# Patient Record
Sex: MALE | Race: WHITE | Employment: OTHER | URBAN - METROPOLITAN AREA
[De-identification: names, ages, dates, MRNs, and addresses within clinical notes are randomized per-mention and may not be internally consistent; named-entity substitution may affect disease eponyms.]

---

## 2017-01-09 RX ORDER — RANITIDINE 150 MG/1
TABLET ORAL
Qty: 90 TABLET | Refills: 0 | Status: SHIPPED | OUTPATIENT
Start: 2017-01-09 | End: 2017-01-10

## 2017-01-09 NOTE — TELEPHONE ENCOUNTER
Patient's wife called requesting a refill for RANITIDINE  MG Oral Tab  To be sent to:   [facility] , [place] [address] [place] [address] [phone], [phone]

## 2017-01-10 ENCOUNTER — OFFICE VISIT (OUTPATIENT)
Dept: INTERNAL MEDICINE CLINIC | Facility: CLINIC | Age: 82
End: 2017-01-10

## 2017-01-10 VITALS
BODY MASS INDEX: 30.16 KG/M2 | DIASTOLIC BLOOD PRESSURE: 78 MMHG | RESPIRATION RATE: 14 BRPM | SYSTOLIC BLOOD PRESSURE: 122 MMHG | HEART RATE: 74 BPM | TEMPERATURE: 98 F | HEIGHT: 65 IN | WEIGHT: 181 LBS | OXYGEN SATURATION: 98 %

## 2017-01-10 DIAGNOSIS — H61.23 CERUMEN DEBRIS ON TYMPANIC MEMBRANE, BILATERAL: Primary | ICD-10-CM

## 2017-01-10 PROCEDURE — 99213 OFFICE O/P EST LOW 20 MIN: CPT | Performed by: INTERNAL MEDICINE

## 2017-01-10 RX ORDER — RANITIDINE 150 MG/1
TABLET ORAL
Qty: 90 TABLET | Refills: 1 | Status: SHIPPED | OUTPATIENT
Start: 2017-01-10 | End: 2017-04-17

## 2017-01-10 NOTE — PROGRESS NOTES
Rocio Mccann  1926 is a 80year old male. Patient presents with:  Ear Wax      HPI:   Ear/General:    Presents with c/o Cerumen hearing loss, bilateral , Onset: gradual, Severity: moderate, Nature: dull.        Current Outpatient Prescrip REVIEW OF SYSTEMS:           EXAM:   /78 mmHg  Pulse 74  Temp(Src) 98 °F (36.7 °C) (Oral)  Resp 14  Ht 65\"  Wt 181 lb  BMI 30.12 kg/m2  SpO2 98%  Ear:   External Ear: cerumen bilaterally .              ASSESSMENT AND PLAN:   Kiarra Carreon was seen toda

## 2017-01-16 PROCEDURE — 81001 URINALYSIS AUTO W/SCOPE: CPT | Performed by: UROLOGY

## 2017-01-16 PROCEDURE — 87186 SC STD MICRODIL/AGAR DIL: CPT | Performed by: UROLOGY

## 2017-01-16 PROCEDURE — 87077 CULTURE AEROBIC IDENTIFY: CPT | Performed by: UROLOGY

## 2017-01-16 PROCEDURE — 87086 URINE CULTURE/COLONY COUNT: CPT | Performed by: UROLOGY

## 2017-01-16 RX ORDER — LEVOTHYROXINE SODIUM 0.05 MG/1
TABLET ORAL
Qty: 90 TABLET | Refills: 0 | Status: SHIPPED | OUTPATIENT
Start: 2017-01-16 | End: 2017-04-06

## 2017-01-16 NOTE — TELEPHONE ENCOUNTER
Wife,  (ok with HIPPA consent), informed patient needs a follow up appointment to get refill. They are leaving out of country for two months on 1/20/17. Transferred to [facility] to schedule follow up. Medication: Exelon    Date of last refill: 12/15/16  Date last

## 2017-04-03 ENCOUNTER — OFFICE VISIT (OUTPATIENT)
Dept: INTERNAL MEDICINE CLINIC | Facility: CLINIC | Age: 82
End: 2017-04-03

## 2017-04-03 VITALS
BODY MASS INDEX: 27.49 KG/M2 | TEMPERATURE: 98 F | WEIGHT: 165 LBS | HEART RATE: 56 BPM | SYSTOLIC BLOOD PRESSURE: 140 MMHG | DIASTOLIC BLOOD PRESSURE: 88 MMHG | HEIGHT: 65 IN | OXYGEN SATURATION: 96 % | RESPIRATION RATE: 17 BRPM

## 2017-04-03 DIAGNOSIS — Z00.00 LABORATORY EXAMINATION ORDERED AS PART OF A ROUTINE GENERAL MEDICAL EXAMINATION: ICD-10-CM

## 2017-04-03 DIAGNOSIS — R53.83 FATIGUE, UNSPECIFIED TYPE: Primary | ICD-10-CM

## 2017-04-03 PROCEDURE — 99214 OFFICE O/P EST MOD 30 MIN: CPT | Performed by: INTERNAL MEDICINE

## 2017-04-03 PROCEDURE — 87186 SC STD MICRODIL/AGAR DIL: CPT | Performed by: UROLOGY

## 2017-04-03 PROCEDURE — 87086 URINE CULTURE/COLONY COUNT: CPT | Performed by: UROLOGY

## 2017-04-03 PROCEDURE — 87088 URINE BACTERIA CULTURE: CPT | Performed by: UROLOGY

## 2017-04-03 NOTE — PROGRESS NOTES
Gaston Ray  1926 is a 80year old male.     Patient presents with:  Fatigue      HPI:   Just returned from [facility] after 2 months - had an long journey- he feels tired    Current Outpatient Prescriptions:  LEVOTHYROXINE SODIUM 50 MCG O voice. Respiratory:   Shortness of breath when lying flat no. fever no. Chest pain none. Cough none. HARDY (dyspnea on exertion) none. Wheezing no. Cardiovascular:   Syncope none. Rapid heart beat at rest no. Change in exercise tolerance no.  Chest pain n Alert & oriented x 3. Motor: normal strength bilaterally. Muscle Bulk: normal .   Plantars: downgoing bilaterally. Reflexes: normal.   Sensory: normal sensation. Tone: normal.     ASSESSMENT AND PLAN:   Joce Jackson was seen today for fatigue.     Rika Presume

## 2017-04-04 ENCOUNTER — LAB ENCOUNTER (OUTPATIENT)
Dept: LAB | Age: 82
End: 2017-04-04
Attending: INTERNAL MEDICINE
Payer: MEDICARE

## 2017-04-04 ENCOUNTER — HOSPITAL ENCOUNTER (OUTPATIENT)
Dept: GENERAL RADIOLOGY | Facility: HOSPITAL | Age: 82
Discharge: HOME | End: 2017-04-04
Attending: INTERNAL MEDICINE
Payer: MEDICARE

## 2017-04-04 DIAGNOSIS — R53.83 FATIGUE, UNSPECIFIED TYPE: ICD-10-CM

## 2017-04-04 DIAGNOSIS — Z00.00 LABORATORY EXAMINATION ORDERED AS PART OF A ROUTINE GENERAL MEDICAL EXAMINATION: ICD-10-CM

## 2017-04-04 PROCEDURE — 36415 COLL VENOUS BLD VENIPUNCTURE: CPT

## 2017-04-04 PROCEDURE — 71020 XR CHEST PA + LAT CHEST (CPT=71020): CPT

## 2017-04-04 PROCEDURE — 85025 COMPLETE CBC W/AUTO DIFF WBC: CPT

## 2017-04-04 PROCEDURE — 80053 COMPREHEN METABOLIC PANEL: CPT

## 2017-04-04 PROCEDURE — 84153 ASSAY OF PSA TOTAL: CPT

## 2017-04-04 PROCEDURE — 85652 RBC SED RATE AUTOMATED: CPT

## 2017-04-04 PROCEDURE — 83036 HEMOGLOBIN GLYCOSYLATED A1C: CPT

## 2017-04-04 PROCEDURE — 86140 C-REACTIVE PROTEIN: CPT

## 2017-04-04 PROCEDURE — 84436 ASSAY OF TOTAL THYROXINE: CPT

## 2017-04-04 PROCEDURE — 84443 ASSAY THYROID STIM HORMONE: CPT

## 2017-04-04 PROCEDURE — 80061 LIPID PANEL: CPT

## 2017-04-06 ENCOUNTER — OFFICE VISIT (OUTPATIENT)
Dept: INTERNAL MEDICINE CLINIC | Facility: CLINIC | Age: 82
End: 2017-04-06

## 2017-04-06 VITALS
WEIGHT: 174 LBS | OXYGEN SATURATION: 96 % | BODY MASS INDEX: 28.99 KG/M2 | HEART RATE: 69 BPM | HEIGHT: 65 IN | TEMPERATURE: 98 F | DIASTOLIC BLOOD PRESSURE: 84 MMHG | SYSTOLIC BLOOD PRESSURE: 140 MMHG

## 2017-04-06 DIAGNOSIS — G30.1 LATE ONSET ALZHEIMER'S DISEASE WITHOUT BEHAVIORAL DISTURBANCE (HCC): ICD-10-CM

## 2017-04-06 DIAGNOSIS — E78.00 HYPERCHOLESTEREMIA: Chronic | ICD-10-CM

## 2017-04-06 DIAGNOSIS — F02.80 LATE ONSET ALZHEIMER'S DISEASE WITHOUT BEHAVIORAL DISTURBANCE (HCC): ICD-10-CM

## 2017-04-06 DIAGNOSIS — Z00.00 ROUTINE GENERAL MEDICAL EXAMINATION AT A HEALTH CARE FACILITY: Primary | ICD-10-CM

## 2017-04-06 DIAGNOSIS — N31.9 NEUROGENIC BLADDER: Chronic | ICD-10-CM

## 2017-04-06 DIAGNOSIS — E03.9 HYPOTHYROIDISM (ACQUIRED): Chronic | ICD-10-CM

## 2017-04-06 DIAGNOSIS — L98.9 SKIN LESION: ICD-10-CM

## 2017-04-06 PROCEDURE — 96160 PT-FOCUSED HLTH RISK ASSMT: CPT | Performed by: INTERNAL MEDICINE

## 2017-04-06 PROCEDURE — 93000 ELECTROCARDIOGRAM COMPLETE: CPT | Performed by: INTERNAL MEDICINE

## 2017-04-06 RX ORDER — LEVOTHYROXINE SODIUM 0.05 MG/1
TABLET ORAL
Qty: 90 TABLET | Refills: 3 | Status: SHIPPED | OUTPATIENT
Start: 2017-04-06 | End: 2018-03-09

## 2017-04-06 NOTE — PATIENT INSTRUCTIONS
Ming Tom[facility] SCREENING SCHEDULE   Tests on this list are recommended by your physician but may not be covered, or covered at this frequency, by your insurer. Please check with your insurance carrier before scheduling to verify coverage.

## 2017-04-06 NOTE — PROGRESS NOTES
Dai Clarke is a 80year old male who presents for a Medicare Annual Wellness visit.     Male      Patient Care Team: Patient Care Team:  Barbara Holland MD as PCP - General  Jim Perera MD as PCP (Internal Medicine)  Nuris Maurer MD [facility] Value Date   LDL 71 04/04/2017   LDL 61 10/21/2016   LDL 76 10/09/2015       Lab Results  Component Value Date   AST 24 04/04/2017   AST 25 10/21/2016   AST 21 10/09/2015       Lab Results  Component Value Date   ALT 34 04/04/2017   ALT 40 10/21/2016   ALT Do you have 3 or more medical conditions?: 1-Yes    Have you fallen in the last 12 months?: 0-No    Do you accidently lose urine?: 0-No    Do you have difficulty seeing?: 1-Yes (glasses)    Do you have any difficulty walking or getting up?: 1-Yes (sore Preventative Physical Exam only, or if medically necessary yes    Colorectal Cancer Screening      Colonoscopy Screen every 10 years Colonoscopy,10 Years due on 09/22/2024 Update Health Maintenance if applicable    Flex Sigmoidoscopy Screen every 5 years N found.    Creat/alb ratio  Annually      LDL  Annually LDL CHOLESTEROL CALC (mg/dL)   Date Value   05/24/2014 73     LDL CHOLESTEROL (mg/dL)   Date Value   04/04/2017 71     LDL-CHOLESTEROL (mg/dL (calc))   Date Value   10/09/2015 76    No flowsheet data f injections in knees, unsure of what is injected    EYE SURGERY      Comment left eye. 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 Dysuria none. Nocturia None.  Hx ou uri limits. Pupils BEERTL. Sclera and Conjunctiva normal.  Left lower eyelid mildly inflamed and inability to close the eye completely secondary to Bell's palsy   Visual acuity left normal right normal    Head: normocephalic. Nasal septum: midline.    Nose: nor CHRONIC CONDITIONS:   Marlene Lowe is a 80year old male who presents for a Medicare Assessment.      PLAN SUMMARY:   Diagnoses and all orders for this visit:    Routine general medical examination at a health care facility    Hypothyroidism (acquir

## 2017-04-10 PROBLEM — G62.9 NEUROPATHY: Status: ACTIVE | Noted: 2017-04-10

## 2017-04-10 NOTE — PATIENT INSTRUCTIONS
Refill policies:    • Allow 2 business days for refills; controlled substances may take longer.   • Contact your pharmacy at least 5 days prior to running out of medication and have them send an electronic request or submit request through the “request re insurance carrier to obtain pre-certification or prior authorization. Unfortunately, [facility] has seen an increase in denial of payment even though the procedure/test has been pre-certified.   You are strongly encouraged to contact your insurance carrier to v

## 2017-04-10 NOTE — PROGRESS NOTES
Eve 1827   Neurology; follow up  CLINIC VISIT  04/10/2017    Gerhardt Oris Patient Status:  No patient class for patient encounter    1926 MRN [id number]   Location [facility] PCP Laurel Pacheco MD father. SOCIAL HISTORY:   reports that he has quit smoking. His smoking use included Cigarettes and Cigars. He has never used smokeless tobacco. He reports that he drinks alcohol. He reports that he does not use illicit drugs.     ALLERGIES:    Tetanus T mass index is 28.96 kg/(m^2). General:  Patient is a 80year old male in no acute distress. appearance: Normal developed and well nourished , in no acute stress;   HEENT:  Normal conjunctiva, no abnormal secretion,   Neck supple,  No carotid bruit,  thyro asymmetric enhancement of the labyrinthine segment of the left facial nerve and the left geniculate ganglion which is nonspecific but could be related to the patient's left Bell's palsy. Clinical correlation recommended.   3. Minimal chronic microvascular i

## 2017-04-17 RX ORDER — RANITIDINE 150 MG/1
TABLET ORAL
Qty: 90 TABLET | Refills: 1 | Status: SHIPPED | OUTPATIENT
Start: 2017-04-17 | End: 2018-01-07

## 2017-04-24 ENCOUNTER — OFFICE VISIT (OUTPATIENT)
Dept: INTERNAL MEDICINE CLINIC | Facility: CLINIC | Age: 82
End: 2017-04-24

## 2017-04-24 VITALS
TEMPERATURE: 98 F | BODY MASS INDEX: 29.24 KG/M2 | WEIGHT: 175.5 LBS | HEIGHT: 65 IN | OXYGEN SATURATION: 96 % | HEART RATE: 67 BPM | SYSTOLIC BLOOD PRESSURE: 120 MMHG | DIASTOLIC BLOOD PRESSURE: 80 MMHG | RESPIRATION RATE: 20 BRPM

## 2017-04-24 DIAGNOSIS — T14.8XXA SKIN ABRASION: Primary | ICD-10-CM

## 2017-04-24 PROCEDURE — 99213 OFFICE O/P EST LOW 20 MIN: CPT | Performed by: INTERNAL MEDICINE

## 2017-04-24 NOTE — PROGRESS NOTES
Newton Telles  1926 is a 80year old male. Patient presents with: Infection: LEft leg.  Scratch by dog      HPI:   Had a dog scratch him on the left shin about a week ago here for follow-up and checkup no complaints    Current Outpatient anterior aspect about 1 inch in diameter no signs of cellulitis      ASSESSMENT AND PLAN:   Claudia Carlin was seen today for infection.     Diagnoses and all orders for this visit:    Skin abrasion      Patient Instructions   Local wound care discussed with mynor

## 2017-04-27 RX ORDER — ATORVASTATIN CALCIUM 10 MG/1
10 TABLET, FILM COATED ORAL NIGHTLY
Qty: 90 TABLET | Refills: 1 | Status: SHIPPED | OUTPATIENT
Start: 2017-04-27 | End: 2017-09-18

## 2017-05-18 ENCOUNTER — APPOINTMENT (OUTPATIENT)
Dept: LAB | Age: 82
End: 2017-05-18
Attending: DERMATOLOGY
Payer: MEDICARE

## 2017-05-18 DIAGNOSIS — D48.5 NEOPLASM OF UNCERTAIN BEHAVIOR OF SKIN: ICD-10-CM

## 2017-05-18 PROCEDURE — 88305 TISSUE EXAM BY PATHOLOGIST: CPT

## 2017-05-18 PROCEDURE — 88342 IMHCHEM/IMCYTCHM 1ST ANTB: CPT

## 2017-09-07 ENCOUNTER — TELEPHONE (OUTPATIENT)
Dept: INTERNAL MEDICINE CLINIC | Facility: CLINIC | Age: 82
End: 2017-09-07

## 2017-09-07 ENCOUNTER — APPOINTMENT (OUTPATIENT)
Dept: LAB | Age: 82
End: 2017-09-07
Attending: DERMATOLOGY
Payer: MEDICARE

## 2017-09-07 DIAGNOSIS — L98.9 SKIN DISEASE: Primary | ICD-10-CM

## 2017-09-07 DIAGNOSIS — C44.519 BASAL CELL CARCINOMA OF ABDOMEN: ICD-10-CM

## 2017-09-07 PROCEDURE — 88305 TISSUE EXAM BY PATHOLOGIST: CPT

## 2017-09-07 NOTE — TELEPHONE ENCOUNTER
Reason for the order/referral: urgent referral for today's appt with Dr. Austin Dunbar   PCP: [email or web address] Dr. Storey Backbone   Refer to Provider (first and last name): Dr. Austin Dunbar   Specialty: dermatology   Patient Insurance: Payor: [facility] HMO HUM / Plan: Eusebia Bailey

## 2017-09-11 PROCEDURE — 87077 CULTURE AEROBIC IDENTIFY: CPT | Performed by: UROLOGY

## 2017-09-11 PROCEDURE — 87086 URINE CULTURE/COLONY COUNT: CPT | Performed by: UROLOGY

## 2017-09-11 PROCEDURE — 87186 SC STD MICRODIL/AGAR DIL: CPT | Performed by: UROLOGY

## 2017-09-18 RX ORDER — ATORVASTATIN CALCIUM 10 MG/1
TABLET, FILM COATED ORAL
Qty: 90 TABLET | Refills: 0 | Status: SHIPPED | OUTPATIENT
Start: 2017-09-18 | End: 2018-01-07

## 2017-09-26 ENCOUNTER — TELEPHONE (OUTPATIENT)
Dept: INTERNAL MEDICINE CLINIC | Facility: CLINIC | Age: 82
End: 2017-09-26

## 2017-09-26 NOTE — TELEPHONE ENCOUNTER
Pts wife called in stating that [facility] [phone] called her stating that they have been trying to reach us to receive a RX for pts catheter supplies.      Informed pt that I have spoken with Bony Coronado at [place] [facility] [phone] on 9/20/17 and clarified that all needed informati

## 2017-10-02 ENCOUNTER — OFFICE VISIT (OUTPATIENT)
Dept: INTERNAL MEDICINE CLINIC | Facility: CLINIC | Age: 82
End: 2017-10-02

## 2017-10-02 VITALS
HEART RATE: 90 BPM | TEMPERATURE: 99 F | RESPIRATION RATE: 16 BRPM | SYSTOLIC BLOOD PRESSURE: 110 MMHG | HEIGHT: 65 IN | OXYGEN SATURATION: 95 % | DIASTOLIC BLOOD PRESSURE: 60 MMHG

## 2017-10-02 DIAGNOSIS — R10.31 GROIN PAIN, RIGHT: Primary | ICD-10-CM

## 2017-10-02 DIAGNOSIS — N31.9 NEUROGENIC BLADDER: Chronic | ICD-10-CM

## 2017-10-02 PROCEDURE — 99213 OFFICE O/P EST LOW 20 MIN: CPT | Performed by: INTERNAL MEDICINE

## 2017-10-02 RX ORDER — INFLUENZA A VIRUSA/MICHIGAN/45/2015 X-275 (H1N1) ANTIGEN (FORMALDEHYDE INACTIVATED), INFLUENZA A VIRUS A/HONG KONG/4801/2014 X-263B (H3N2) ANTIGEN (FORMALDEHYDE INACTIVATED), AND INFLUENZA B VIRUS B/BRISBANE/60/2008 ANTIGEN (FORMALDEHYDE INACTIVATED) 60; 60; 60 UG/.5ML; UG/.5ML; UG/.5ML
INJECTION, SUSPENSION INTRAMUSCULAR
COMMUNITY
Start: 2017-09-19 | End: 2018-07-03 | Stop reason: ALTCHOICE

## 2017-10-02 NOTE — PATIENT INSTRUCTIONS
rx pending both tests including the possibility of a testicular ultrasound.   Patient did see his urologist 4 weeks ago

## 2017-10-02 NOTE — PROGRESS NOTES
Eduardo Desir  1926 is a 80year old male.     Patient presents with:  Pain: Est Pt. c/c x 3-4 days groin pain      HPI:   C/o pain in hip  Right   Hip  Presents with c/o Pain off and on for the last few weeks  Injury  neg  Swelling  neg  Wea Fever no. EXAM:   /60   Pulse 90   Temp 99 °F (37.2 °C) (Oral)   Resp 16   Ht 65\"   SpO2 95%       HIP:  INSPECTION:normal  WOUNDS: no wounds appreciated. PALPATION: non-tender. STABILITY: no instability.    VASCULAR: normal  STRENGTH: 5/

## 2017-10-03 ENCOUNTER — TELEPHONE (OUTPATIENT)
Dept: INTERNAL MEDICINE CLINIC | Facility: CLINIC | Age: 82
End: 2017-10-03

## 2017-10-03 ENCOUNTER — HOSPITAL ENCOUNTER (OUTPATIENT)
Dept: GENERAL RADIOLOGY | Age: 82
Discharge: HOME | End: 2017-10-03
Attending: INTERNAL MEDICINE
Payer: MEDICARE

## 2017-10-03 ENCOUNTER — APPOINTMENT (OUTPATIENT)
Dept: LAB | Age: 82
End: 2017-10-03
Attending: INTERNAL MEDICINE
Payer: MEDICARE

## 2017-10-03 DIAGNOSIS — R10.31 GROIN PAIN, RIGHT: ICD-10-CM

## 2017-10-03 DIAGNOSIS — N31.9 NEUROGENIC BLADDER: Chronic | ICD-10-CM

## 2017-10-03 PROCEDURE — 87088 URINE BACTERIA CULTURE: CPT

## 2017-10-03 PROCEDURE — 87186 SC STD MICRODIL/AGAR DIL: CPT

## 2017-10-03 PROCEDURE — 73502 X-RAY EXAM HIP UNI 2-3 VIEWS: CPT | Performed by: INTERNAL MEDICINE

## 2017-10-03 PROCEDURE — 87086 URINE CULTURE/COLONY COUNT: CPT

## 2017-10-03 PROCEDURE — 81001 URINALYSIS AUTO W/SCOPE: CPT

## 2017-10-03 NOTE — TELEPHONE ENCOUNTER
Spouse calling in behalf of pt, returning phone call regarding test results. Please contact/patient spouse to go over results.

## 2017-10-04 RX ORDER — NAPROXEN 500 MG/1
500 TABLET ORAL 2 TIMES DAILY WITH MEALS
Qty: 60 TABLET | Refills: 0 | Status: SHIPPED | OUTPATIENT
Start: 2017-10-04 | End: 2018-03-19 | Stop reason: ALTCHOICE

## 2017-10-06 ENCOUNTER — HOSPITAL ENCOUNTER (OUTPATIENT)
Dept: ULTRASOUND IMAGING | Age: 82
Discharge: HOME | End: 2017-10-06
Attending: INTERNAL MEDICINE
Payer: MEDICARE

## 2017-10-06 DIAGNOSIS — N50.89 SWOLLEN TESTICLE: ICD-10-CM

## 2017-10-06 DIAGNOSIS — N50.89 SWOLLEN TESTICLE: Primary | ICD-10-CM

## 2017-10-06 PROCEDURE — 76870 US EXAM SCROTUM: CPT | Performed by: INTERNAL MEDICINE

## 2017-10-06 PROCEDURE — 93975 VASCULAR STUDY: CPT | Performed by: INTERNAL MEDICINE

## 2017-10-06 RX ORDER — CIPROFLOXACIN 250 MG/1
250 TABLET, FILM COATED ORAL 2 TIMES DAILY
Qty: 20 TABLET | Refills: 0 | Status: SHIPPED | OUTPATIENT
Start: 2017-10-06 | End: 2017-10-16

## 2017-11-06 ENCOUNTER — LAB ENCOUNTER (OUTPATIENT)
Dept: LAB | Age: 82
End: 2017-11-06
Attending: INTERNAL MEDICINE
Payer: MEDICARE

## 2017-11-06 ENCOUNTER — OFFICE VISIT (OUTPATIENT)
Dept: INTERNAL MEDICINE CLINIC | Facility: CLINIC | Age: 82
End: 2017-11-06

## 2017-11-06 VITALS
WEIGHT: 173 LBS | TEMPERATURE: 98 F | HEART RATE: 70 BPM | HEIGHT: 63 IN | OXYGEN SATURATION: 97 % | BODY MASS INDEX: 30.65 KG/M2 | DIASTOLIC BLOOD PRESSURE: 82 MMHG | RESPIRATION RATE: 20 BRPM | SYSTOLIC BLOOD PRESSURE: 132 MMHG

## 2017-11-06 DIAGNOSIS — N30.00 ACUTE CYSTITIS WITHOUT HEMATURIA: Primary | ICD-10-CM

## 2017-11-06 DIAGNOSIS — N30.00 ACUTE CYSTITIS WITHOUT HEMATURIA: ICD-10-CM

## 2017-11-06 PROCEDURE — 87186 SC STD MICRODIL/AGAR DIL: CPT

## 2017-11-06 PROCEDURE — 87086 URINE CULTURE/COLONY COUNT: CPT

## 2017-11-06 PROCEDURE — 87077 CULTURE AEROBIC IDENTIFY: CPT

## 2017-11-06 PROCEDURE — 99213 OFFICE O/P EST LOW 20 MIN: CPT | Performed by: INTERNAL MEDICINE

## 2017-11-06 PROCEDURE — 81001 URINALYSIS AUTO W/SCOPE: CPT

## 2017-11-06 NOTE — PROGRESS NOTES
Marlene Lowe [facility] 1926 is a 80year old male. Patient presents with:   Follow - Up      HPI:   Symptoms of pain in the right groin areas/testicle a pretty much resolved      Current Outpatient Prescriptions:  naproxen 500 MG Oral Tab Take 1 t General/Constitutional:   Chills no. Fatigue no. Fever no.          EXAM:   /82   Pulse 70   Temp 97.7 °F (36.5 °C)   Resp 20   Ht 63\"   Wt 173 lb   SpO2 97%   BMI 30.65 kg/m²               Penis scrotum unremarkable no tenderness noted no righ

## 2018-01-02 ENCOUNTER — OFFICE VISIT (OUTPATIENT)
Dept: INTERNAL MEDICINE CLINIC | Facility: CLINIC | Age: 83
End: 2018-01-02

## 2018-01-02 ENCOUNTER — HOSPITAL ENCOUNTER (OUTPATIENT)
Dept: GENERAL RADIOLOGY | Age: 83
Discharge: HOME | End: 2018-01-02
Attending: INTERNAL MEDICINE
Payer: MEDICARE

## 2018-01-02 VITALS
WEIGHT: 172 LBS | OXYGEN SATURATION: 90 % | DIASTOLIC BLOOD PRESSURE: 82 MMHG | BODY MASS INDEX: 30 KG/M2 | RESPIRATION RATE: 20 BRPM | TEMPERATURE: 98 F | HEART RATE: 84 BPM | SYSTOLIC BLOOD PRESSURE: 140 MMHG

## 2018-01-02 DIAGNOSIS — R05.9 COUGH: Primary | ICD-10-CM

## 2018-01-02 DIAGNOSIS — R05.9 COUGH: ICD-10-CM

## 2018-01-02 PROCEDURE — 99213 OFFICE O/P EST LOW 20 MIN: CPT | Performed by: INTERNAL MEDICINE

## 2018-01-02 PROCEDURE — 71046 X-RAY EXAM CHEST 2 VIEWS: CPT | Performed by: INTERNAL MEDICINE

## 2018-01-02 RX ORDER — LEVOFLOXACIN 500 MG/1
500 TABLET, FILM COATED ORAL DAILY
Qty: 10 TABLET | Refills: 0 | Status: SHIPPED | OUTPATIENT
Start: 2018-01-02 | End: 2018-01-12

## 2018-01-02 RX ORDER — PREDNISONE 1 MG/1
TABLET ORAL
Qty: 26 TABLET | Refills: 0 | Status: SHIPPED | OUTPATIENT
Start: 2018-01-02 | End: 2018-03-19 | Stop reason: ALTCHOICE

## 2018-01-02 RX ORDER — CODEINE PHOSPHATE AND GUAIFENESIN 10; 100 MG/5ML; MG/5ML
5 SOLUTION ORAL EVERY 6 HOURS PRN
Qty: 240 ML | Refills: 0 | Status: SHIPPED | OUTPATIENT
Start: 2018-01-02 | End: 2018-01-12

## 2018-01-02 NOTE — PROGRESS NOTES
Kang Angel  1926 is a 80year old male who presents for upper respiratory symptoms    Patient presents with:  Cough        HPI:   Pt reports  respiratory symptoms for 1 week. productive cough with scanty yellowish expectoration.        Dina Dixon 11/3/2015   • Retention of urine, unspecified     self cath   • Vitamin D deficiency disease 8/14/2012      Smoking status: Former Smoker                                                              Packs/day: 0.00      Years: 0.00         Types: Cigarette worsen or fail to improve. Elisa Buerger, MD

## 2018-01-08 RX ORDER — RANITIDINE 150 MG/1
TABLET ORAL
Qty: 90 TABLET | Refills: 0 | Status: SHIPPED | OUTPATIENT
Start: 2018-01-08 | End: 2018-03-27

## 2018-01-08 RX ORDER — ATORVASTATIN CALCIUM 10 MG/1
TABLET, FILM COATED ORAL
Qty: 90 TABLET | Refills: 0 | Status: SHIPPED | OUTPATIENT
Start: 2018-01-08 | End: 2018-03-26

## 2018-02-14 ENCOUNTER — TELEPHONE (OUTPATIENT)
Dept: INTERNAL MEDICINE CLINIC | Facility: CLINIC | Age: 83
End: 2018-02-14

## 2018-02-14 DIAGNOSIS — N32.9 BLADDER PROBLEM: Primary | ICD-10-CM

## 2018-02-14 NOTE — TELEPHONE ENCOUNTER
.Reason for the order/referral:Follow Up   PCP: Ligia Cabrales   Refer to Provider: Sheron Riedel   Specialty:Urologist   Patient Insurance: Payor: [facility] MED ADV HMO HUM / Plan: [id number] [facility] HMO / Product Type: HMO /   Has the patient been seen by their PCP for this

## 2018-02-19 PROCEDURE — 87186 SC STD MICRODIL/AGAR DIL: CPT | Performed by: UROLOGY

## 2018-02-19 PROCEDURE — 87086 URINE CULTURE/COLONY COUNT: CPT | Performed by: UROLOGY

## 2018-02-19 PROCEDURE — 87077 CULTURE AEROBIC IDENTIFY: CPT | Performed by: UROLOGY

## 2018-03-05 ENCOUNTER — TELEPHONE (OUTPATIENT)
Dept: INTERNAL MEDICINE CLINIC | Facility: CLINIC | Age: 83
End: 2018-03-05

## 2018-03-05 NOTE — TELEPHONE ENCOUNTER
Patient dropped off form to be completed by Physician.     Title of form: Certification for Parking Placard/License Plates

## 2018-03-08 ENCOUNTER — TELEPHONE (OUTPATIENT)
Dept: INTERNAL MEDICINE CLINIC | Facility: CLINIC | Age: 83
End: 2018-03-08

## 2018-03-08 NOTE — TELEPHONE ENCOUNTER
Person with Disability Certification for Parking Placard/License Plate has been completed. Patient has been notified paperwork is ready for pick-up.

## 2018-03-09 RX ORDER — LEVOTHYROXINE SODIUM 0.05 MG/1
TABLET ORAL
Qty: 90 TABLET | Refills: 2 | Status: SHIPPED | OUTPATIENT
Start: 2018-03-09 | End: 2018-11-25

## 2018-03-10 RX ORDER — LEVOTHYROXINE SODIUM 0.05 MG/1
TABLET ORAL
Qty: 40 TABLET | Refills: 2 | OUTPATIENT
Start: 2018-03-10

## 2018-03-19 ENCOUNTER — OFFICE VISIT (OUTPATIENT)
Dept: INTERNAL MEDICINE CLINIC | Facility: CLINIC | Age: 83
End: 2018-03-19

## 2018-03-19 VITALS
BODY MASS INDEX: 31.71 KG/M2 | DIASTOLIC BLOOD PRESSURE: 84 MMHG | HEIGHT: 63 IN | TEMPERATURE: 98 F | HEART RATE: 60 BPM | WEIGHT: 179 LBS | RESPIRATION RATE: 13 BRPM | OXYGEN SATURATION: 99 % | SYSTOLIC BLOOD PRESSURE: 132 MMHG

## 2018-03-19 DIAGNOSIS — N30.00 ACUTE CYSTITIS WITHOUT HEMATURIA: Primary | ICD-10-CM

## 2018-03-19 PROCEDURE — 99213 OFFICE O/P EST LOW 20 MIN: CPT | Performed by: INTERNAL MEDICINE

## 2018-03-19 NOTE — PROGRESS NOTES
Stacie Shay  1926 is a 80year old male. Patient presents with:  UTI      HPI:   Symptom out some discomfort in urination.   Just saw the urologist those notes of been reviewed antibiotic was not recommended      Current Outpatient Presc Patient Position: Sitting, Cuff Size: adult)   Pulse 60   Temp 97.7 °F (36.5 °C) (Oral)   Resp 13   Ht 63\"   Wt 179 lb   SpO2 99%   BMI 31.71 kg/m²               Penis scrotum unremarkable no tenderness noted no right CVA angle tenderness      ASSESSMENT

## 2018-03-20 ENCOUNTER — APPOINTMENT (OUTPATIENT)
Dept: LAB | Age: 83
End: 2018-03-20
Attending: INTERNAL MEDICINE
Payer: MEDICARE

## 2018-03-20 DIAGNOSIS — N30.00 ACUTE CYSTITIS WITHOUT HEMATURIA: ICD-10-CM

## 2018-03-20 PROCEDURE — 87186 SC STD MICRODIL/AGAR DIL: CPT

## 2018-03-20 PROCEDURE — 81001 URINALYSIS AUTO W/SCOPE: CPT

## 2018-03-20 PROCEDURE — 87077 CULTURE AEROBIC IDENTIFY: CPT

## 2018-03-20 PROCEDURE — 87086 URINE CULTURE/COLONY COUNT: CPT

## 2018-03-24 ENCOUNTER — OFFICE VISIT (OUTPATIENT)
Dept: INTERNAL MEDICINE CLINIC | Facility: CLINIC | Age: 83
End: 2018-03-24

## 2018-03-24 VITALS
RESPIRATION RATE: 16 BRPM | SYSTOLIC BLOOD PRESSURE: 148 MMHG | HEIGHT: 64 IN | TEMPERATURE: 99 F | HEART RATE: 66 BPM | OXYGEN SATURATION: 95 % | DIASTOLIC BLOOD PRESSURE: 84 MMHG | BODY MASS INDEX: 29.37 KG/M2 | WEIGHT: 172 LBS

## 2018-03-24 DIAGNOSIS — F02.80 LATE ONSET ALZHEIMER'S DISEASE WITHOUT BEHAVIORAL DISTURBANCE (HCC): ICD-10-CM

## 2018-03-24 DIAGNOSIS — E78.00 HYPERCHOLESTEREMIA: Chronic | ICD-10-CM

## 2018-03-24 DIAGNOSIS — Z00.00 ROUTINE GENERAL MEDICAL EXAMINATION AT A HEALTH CARE FACILITY: Primary | ICD-10-CM

## 2018-03-24 DIAGNOSIS — E03.9 HYPOTHYROIDISM (ACQUIRED): Chronic | ICD-10-CM

## 2018-03-24 DIAGNOSIS — G62.9 NEUROPATHY: ICD-10-CM

## 2018-03-24 DIAGNOSIS — I10 ESSENTIAL HYPERTENSION: ICD-10-CM

## 2018-03-24 DIAGNOSIS — G30.1 LATE ONSET ALZHEIMER'S DISEASE WITHOUT BEHAVIORAL DISTURBANCE (HCC): ICD-10-CM

## 2018-03-24 DIAGNOSIS — N31.9 NEUROGENIC BLADDER: Chronic | ICD-10-CM

## 2018-03-24 LAB
ALBUMIN SERPL-MCNC: 3.5 G/DL (ref 3.5–4.8)
ALP LIVER SERPL-CCNC: 92 U/L (ref 45–117)
ALT SERPL-CCNC: 30 U/L (ref 17–63)
AST SERPL-CCNC: 23 U/L (ref 15–41)
BASOPHILS # BLD AUTO: 0.03 X10(3) UL (ref 0–0.1)
BASOPHILS NFR BLD AUTO: 0.7 %
BILIRUB SERPL-MCNC: 1.2 MG/DL (ref 0.1–2)
BUN BLD-MCNC: 15 MG/DL (ref 8–20)
CALCIUM BLD-MCNC: 8.7 MG/DL (ref 8.3–10.3)
CHLORIDE: 106 MMOL/L (ref 101–111)
CHOLEST SMN-MCNC: 133 MG/DL (ref ?–200)
CO2: 28 MMOL/L (ref 22–32)
CREAT BLD-MCNC: 1.05 MG/DL (ref 0.7–1.3)
EOSINOPHIL # BLD AUTO: 0 X10(3) UL (ref 0–0.3)
EOSINOPHIL NFR BLD AUTO: 0 %
ERYTHROCYTE [DISTWIDTH] IN BLOOD BY AUTOMATED COUNT: 13.1 % (ref 11.5–16)
EST. AVERAGE GLUCOSE BLD GHB EST-MCNC: 120 MG/DL (ref 68–126)
GLUCOSE BLD-MCNC: 105 MG/DL (ref 70–99)
HBA1C MFR BLD HPLC: 5.8 % (ref ?–5.7)
HCT VFR BLD AUTO: 45.6 % (ref 37–53)
HDLC SERPL-MCNC: 53 MG/DL (ref 45–?)
HDLC SERPL: 2.51 {RATIO} (ref ?–4.97)
HGB BLD-MCNC: 15 G/DL (ref 13–17)
IMMATURE GRANULOCYTE COUNT: 0.03 X10(3) UL (ref 0–1)
IMMATURE GRANULOCYTE RATIO %: 0.7 %
LDLC SERPL CALC-MCNC: 66 MG/DL (ref ?–130)
LYMPHOCYTES # BLD AUTO: 0.89 X10(3) UL (ref 0.9–4)
LYMPHOCYTES NFR BLD AUTO: 21.1 %
M PROTEIN MFR SERPL ELPH: 6.8 G/DL (ref 6.1–8.3)
MCH RBC QN AUTO: 32.5 PG (ref 27–33.2)
MCHC RBC AUTO-ENTMCNC: 32.9 G/DL (ref 31–37)
MCV RBC AUTO: 98.9 FL (ref 80–99)
MONOCYTES # BLD AUTO: 0.41 X10(3) UL (ref 0.1–1)
MONOCYTES NFR BLD AUTO: 9.7 %
NEUTROPHIL ABS PRELIM: 2.86 X10 (3) UL (ref 1.3–6.7)
NEUTROPHILS # BLD AUTO: 2.86 X10(3) UL (ref 1.3–6.7)
NEUTROPHILS NFR BLD AUTO: 67.8 %
NONHDLC SERPL-MCNC: 80 MG/DL (ref ?–130)
PLATELET # BLD AUTO: 182 10(3)UL (ref 150–450)
POTASSIUM SERPL-SCNC: 4.3 MMOL/L (ref 3.6–5.1)
PSA SERPL-MCNC: 5.81 NG/ML (ref 0.01–4)
RBC # BLD AUTO: 4.61 X10(6)UL (ref 3.8–5.8)
RED CELL DISTRIBUTION WIDTH-SD: 47.4 FL (ref 35.1–46.3)
SODIUM SERPL-SCNC: 141 MMOL/L (ref 136–144)
THYROXINE (T4): 11.8 UG/DL (ref 4.5–10.9)
TRIGL SERPL-MCNC: 72 MG/DL (ref ?–150)
TSI SER-ACNC: 3.73 MIU/ML (ref 0.35–5.5)
VLDLC SERPL CALC-MCNC: 14 MG/DL (ref 5–40)
WBC # BLD AUTO: 4.2 X10(3) UL (ref 4–13)

## 2018-03-24 PROCEDURE — 84153 ASSAY OF PSA TOTAL: CPT | Performed by: INTERNAL MEDICINE

## 2018-03-24 PROCEDURE — 84443 ASSAY THYROID STIM HORMONE: CPT | Performed by: INTERNAL MEDICINE

## 2018-03-24 PROCEDURE — G0439 PPPS, SUBSEQ VISIT: HCPCS | Performed by: INTERNAL MEDICINE

## 2018-03-24 PROCEDURE — 80053 COMPREHEN METABOLIC PANEL: CPT | Performed by: INTERNAL MEDICINE

## 2018-03-24 PROCEDURE — 85025 COMPLETE CBC W/AUTO DIFF WBC: CPT | Performed by: INTERNAL MEDICINE

## 2018-03-24 PROCEDURE — 96160 PT-FOCUSED HLTH RISK ASSMT: CPT | Performed by: INTERNAL MEDICINE

## 2018-03-24 PROCEDURE — 80061 LIPID PANEL: CPT | Performed by: INTERNAL MEDICINE

## 2018-03-24 PROCEDURE — 83036 HEMOGLOBIN GLYCOSYLATED A1C: CPT | Performed by: INTERNAL MEDICINE

## 2018-03-24 PROCEDURE — 84436 ASSAY OF TOTAL THYROXINE: CPT | Performed by: INTERNAL MEDICINE

## 2018-03-24 PROCEDURE — 93005 ELECTROCARDIOGRAM TRACING: CPT | Performed by: INTERNAL MEDICINE

## 2018-03-24 RX ORDER — METOPROLOL SUCCINATE 25 MG/1
25 TABLET, EXTENDED RELEASE ORAL DAILY
Qty: 30 TABLET | Refills: 2 | Status: SHIPPED | OUTPATIENT
Start: 2018-03-24 | End: 2018-06-26
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
## 2018-03-26 RX ORDER — ATORVASTATIN CALCIUM 10 MG/1
TABLET, FILM COATED ORAL
Qty: 90 TABLET | Refills: 0 | Status: SHIPPED | OUTPATIENT
Start: 2018-03-26 | End: 2018-04-11

## 2018-03-28 ENCOUNTER — TELEPHONE (OUTPATIENT)
Dept: INTERNAL MEDICINE CLINIC | Facility: CLINIC | Age: 83
End: 2018-03-28

## 2018-03-28 RX ORDER — CIPROFLOXACIN 500 MG/1
500 TABLET, FILM COATED ORAL 2 TIMES DAILY
Qty: 20 TABLET | Refills: 0 | Status: SHIPPED | OUTPATIENT
Start: 2018-03-28 | End: 2018-06-26

## 2018-03-28 RX ORDER — RANITIDINE 150 MG/1
TABLET ORAL
Qty: 90 TABLET | Refills: 0 | Status: SHIPPED | OUTPATIENT
Start: 2018-03-28 | End: 2018-04-11

## 2018-03-28 NOTE — TELEPHONE ENCOUNTER
----- Message from Dahlia Bonilla MD sent at 3/26/2018 12:08 PM CDT -----  Reviewed results   Cipro 500 twice daily for 10 days

## 2018-04-11 RX ORDER — RANITIDINE 150 MG/1
TABLET ORAL
Qty: 90 TABLET | Refills: 0 | Status: SHIPPED | OUTPATIENT
Start: 2018-04-11 | End: 2018-07-03

## 2018-04-11 RX ORDER — ATORVASTATIN CALCIUM 10 MG/1
TABLET, FILM COATED ORAL
Qty: 90 TABLET | Refills: 0 | Status: SHIPPED | OUTPATIENT
Start: 2018-04-11 | End: 2018-04-17

## 2018-04-17 ENCOUNTER — OFFICE VISIT (OUTPATIENT)
Dept: INTERNAL MEDICINE CLINIC | Facility: CLINIC | Age: 83
End: 2018-04-17

## 2018-04-17 VITALS
HEART RATE: 72 BPM | OXYGEN SATURATION: 93 % | SYSTOLIC BLOOD PRESSURE: 136 MMHG | WEIGHT: 177 LBS | RESPIRATION RATE: 16 BRPM | DIASTOLIC BLOOD PRESSURE: 80 MMHG | BODY MASS INDEX: 30 KG/M2

## 2018-04-17 DIAGNOSIS — R97.20 ELEVATED PSA, LESS THAN 10 NG/ML: ICD-10-CM

## 2018-04-17 DIAGNOSIS — I10 ESSENTIAL HYPERTENSION: Primary | ICD-10-CM

## 2018-04-17 PROCEDURE — 99213 OFFICE O/P EST LOW 20 MIN: CPT | Performed by: INTERNAL MEDICINE

## 2018-04-17 NOTE — PROGRESS NOTES
Dino Layton  1926 is a 80year old male. Patient presents with: Follow - Up       HPI:   BP check.   Complains of occasional aches thinks is possibly the statins    Current Outpatient Prescriptions:  RANITIDINE  MG Oral Tab TAKE pressure on medication(s). Irregular heart beat no. Leg edema no. Murmurs no. Orthopnea no. EXAM:   /80   Pulse 72   Resp 16   Wt 177 lb   SpO2 93%   BMI 30.38 kg/m²   HEENT:   jvp not raised.    Ear canals: normal.   Ear drums: normal .   Ears:

## 2018-06-26 ENCOUNTER — OFFICE VISIT (OUTPATIENT)
Dept: INTERNAL MEDICINE CLINIC | Facility: CLINIC | Age: 83
End: 2018-06-26

## 2018-06-26 ENCOUNTER — HOSPITAL ENCOUNTER (OUTPATIENT)
Dept: GENERAL RADIOLOGY | Age: 83
Discharge: HOME | End: 2018-06-26
Attending: INTERNAL MEDICINE
Payer: MEDICARE

## 2018-06-26 ENCOUNTER — APPOINTMENT (OUTPATIENT)
Dept: LAB | Age: 83
End: 2018-06-26
Attending: INTERNAL MEDICINE
Payer: MEDICARE

## 2018-06-26 VITALS
DIASTOLIC BLOOD PRESSURE: 88 MMHG | RESPIRATION RATE: 16 BRPM | WEIGHT: 178 LBS | SYSTOLIC BLOOD PRESSURE: 150 MMHG | HEART RATE: 78 BPM | OXYGEN SATURATION: 96 % | BODY MASS INDEX: 31 KG/M2

## 2018-06-26 DIAGNOSIS — I10 ESSENTIAL HYPERTENSION: ICD-10-CM

## 2018-06-26 DIAGNOSIS — N30.00 ACUTE CYSTITIS WITHOUT HEMATURIA: ICD-10-CM

## 2018-06-26 DIAGNOSIS — N30.00 ACUTE CYSTITIS WITHOUT HEMATURIA: Primary | ICD-10-CM

## 2018-06-26 DIAGNOSIS — L08.9 SKIN INFECTION: ICD-10-CM

## 2018-06-26 DIAGNOSIS — M79.89 SOFT TISSUE MASS: ICD-10-CM

## 2018-06-26 PROCEDURE — 87077 CULTURE AEROBIC IDENTIFY: CPT

## 2018-06-26 PROCEDURE — 87086 URINE CULTURE/COLONY COUNT: CPT

## 2018-06-26 PROCEDURE — 87186 SC STD MICRODIL/AGAR DIL: CPT

## 2018-06-26 PROCEDURE — 81001 URINALYSIS AUTO W/SCOPE: CPT

## 2018-06-26 PROCEDURE — 73090 X-RAY EXAM OF FOREARM: CPT | Performed by: INTERNAL MEDICINE

## 2018-06-26 PROCEDURE — 99214 OFFICE O/P EST MOD 30 MIN: CPT | Performed by: INTERNAL MEDICINE

## 2018-06-26 RX ORDER — METOPROLOL SUCCINATE 25 MG/1
25 TABLET, EXTENDED RELEASE ORAL DAILY
Qty: 30 TABLET | Refills: 2 | COMMUNITY
Start: 2018-06-26 | End: 2018-12-17

## 2018-06-26 RX ORDER — CIPROFLOXACIN 500 MG/1
500 TABLET, FILM COATED ORAL 2 TIMES DAILY
Qty: 20 TABLET | Refills: 0 | Status: SHIPPED | OUTPATIENT
Start: 2018-06-26 | End: 2018-07-06

## 2018-06-26 NOTE — PROGRESS NOTES
London Young  1926 is a 80year old male. Patient presents with:  UTI      HPI:   Symptom out some discomfort in urination.     Apparently picked on his right great toe now has a small excoriation on the lateral aspect of the nailbed righ Comment: social       REVIEW OF SYSTEMS:       General/Constitutional:   Chills no. Fatigue no. Fever no.          EXAM:   BP (!) 180/90   Pulse 78   Resp 16   Wt 178 lb   SpO2 96%   BMI 30.55 kg/m²               Penis scrotum unremarkable no tenderness no

## 2018-07-03 ENCOUNTER — OFFICE VISIT (OUTPATIENT)
Dept: INTERNAL MEDICINE CLINIC | Facility: CLINIC | Age: 83
End: 2018-07-03

## 2018-07-03 VITALS
TEMPERATURE: 98 F | OXYGEN SATURATION: 96 % | WEIGHT: 176 LBS | HEART RATE: 55 BPM | DIASTOLIC BLOOD PRESSURE: 80 MMHG | RESPIRATION RATE: 16 BRPM | SYSTOLIC BLOOD PRESSURE: 126 MMHG | HEIGHT: 64 IN | BODY MASS INDEX: 30.05 KG/M2

## 2018-07-03 DIAGNOSIS — N31.9 NEUROGENIC BLADDER: Chronic | ICD-10-CM

## 2018-07-03 DIAGNOSIS — I10 ESSENTIAL HYPERTENSION: ICD-10-CM

## 2018-07-03 DIAGNOSIS — N30.00 ACUTE CYSTITIS WITHOUT HEMATURIA: Primary | ICD-10-CM

## 2018-07-03 PROCEDURE — 99213 OFFICE O/P EST LOW 20 MIN: CPT | Performed by: INTERNAL MEDICINE

## 2018-07-03 RX ORDER — RANITIDINE 150 MG/1
150 TABLET ORAL NIGHTLY
Qty: 90 TABLET | Refills: 0 | Status: SHIPPED | OUTPATIENT
Start: 2018-07-03 | End: 2019-06-18

## 2018-07-03 RX ORDER — METOPROLOL SUCCINATE 25 MG/1
25 TABLET, EXTENDED RELEASE ORAL DAILY
Qty: 90 TABLET | Refills: 1 | Status: SHIPPED | OUTPATIENT
Start: 2018-07-03 | End: 2019-02-27
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
## 2018-07-03 NOTE — PROGRESS NOTES
Ankur Murphy  1926 is a 80year old male.     Patient presents with:  Test Results: X ray and UA/Culture       HPI:   Symptoms of UTI much better    Current Outpatient Prescriptions:  RaNITidine HCl 150 MG Oral Tab Take 1 tablet (150 mg tota °C) (Oral)   Resp 16   Ht 64\"   Wt 176 lb   SpO2 96%   BMI 30.21 kg/m²         Penis scrotum unremarkable no tenderness noted no right CVA angle tenderness      ASSESSMENT AND PLAN:   Ira Hall was seen today for test results.     Diagnoses and all orders fo

## 2018-07-12 ENCOUNTER — APPOINTMENT (OUTPATIENT)
Dept: LAB | Age: 83
End: 2018-07-12
Attending: INTERNAL MEDICINE
Payer: MEDICARE

## 2018-07-12 DIAGNOSIS — N30.00 ACUTE CYSTITIS WITHOUT HEMATURIA: ICD-10-CM

## 2018-07-12 PROCEDURE — 87086 URINE CULTURE/COLONY COUNT: CPT

## 2018-07-12 PROCEDURE — 87077 CULTURE AEROBIC IDENTIFY: CPT

## 2018-07-13 ENCOUNTER — TELEPHONE (OUTPATIENT)
Dept: INTERNAL MEDICINE CLINIC | Facility: CLINIC | Age: 83
End: 2018-07-13

## 2018-07-13 NOTE — TELEPHONE ENCOUNTER
Lyle Gillespie, called to report lab cancelled patient urinalysis. Urine culture is being processed.       Galilea Ryan is available until 3:30 7/13/18 after that anyone can provide information regarding test cancellation

## 2018-09-20 ENCOUNTER — TELEPHONE (OUTPATIENT)
Dept: INTERNAL MEDICINE CLINIC | Facility: CLINIC | Age: 83
End: 2018-09-20

## 2018-09-20 DIAGNOSIS — R33.9 RETENTION OF URINE, UNSPECIFIED: Primary | ICD-10-CM

## 2018-09-20 NOTE — TELEPHONE ENCOUNTER
Referral request received for DME intermittent straight cath supplies.    Referral pended for approval.

## 2018-09-27 NOTE — TELEPHONE ENCOUNTER
Called [facility] to inquire cancellation of referral.     Sarah Castaneda [facility] Clinical Staff; [facility] Referral Pool             Referral cancelled, not a covered benefit per NCD.       Per [facility] specialist Medicare switch \"coverage determination\"

## 2018-09-28 NOTE — TELEPHONE ENCOUNTER
Received a call from [place] [phone] asking how the referral was going. Informed them that insurance denied it and stated that it was not a covered benefit per NCD.  Informed her that spouse wanted the referral sent to Dr. Toro Boo office since they previously susan

## 2018-09-28 NOTE — TELEPHONE ENCOUNTER
Pt wife called. She stated that fax was supposed to be sent to pt's urologist office not PCP. She is requesting fax to be sent to Dr. Jorge Mendoza office. 9/28 - fax sent to Dr. Jorge Mendoza office. Fax confirmation received.

## 2018-10-03 NOTE — TELEPHONE ENCOUNTER
[facility] calling in inquiring if we would be able to back date the referral to 01/01/2018. Also, if we would be able to resubmit the referral for a few quantity request.  1080 ( 1 year) to 90 ( 1 month) .     Person to Contact: Kinga Bowens: [phone]

## 2018-10-09 NOTE — TELEPHONE ENCOUNTER
Irving Rendon calling please callback [phone] ext [phone] [facility]

## 2018-10-09 NOTE — TELEPHONE ENCOUNTER
Spoke with Avi Lozano - she is requesting a new referral be placed for a different quantity as she thought that was the reasoning for the denial. Informed her that per insurance the cpt code is not a covered benefit and nothing was mentioned about the quantit

## 2018-10-23 ENCOUNTER — TELEPHONE (OUTPATIENT)
Dept: INTERNAL MEDICINE CLINIC | Facility: CLINIC | Age: 83
End: 2018-10-23

## 2018-10-23 NOTE — TELEPHONE ENCOUNTER
Received multiple fax's from [facility] requesting order for straight catheters (same THE [facility] code as before & was denied). Pt's urologist Dr. Dorota Wilson office had been working on order for supplies.     Called to speak with nurse from Dr. Dorota Wilson off

## 2018-10-23 NOTE — TELEPHONE ENCOUNTER
Abdiel Luque called from [facility] to confirm we received fax for catheters  request.

## 2018-10-24 NOTE — TELEPHONE ENCOUNTER
Nurse [address] from urologist called to inform referral for catheters has been processed through their office and sent to [address] [place] [address]. Nurse was advised to call our office if anything else is needed.

## 2018-11-26 RX ORDER — LEVOTHYROXINE SODIUM 0.05 MG/1
TABLET ORAL
Qty: 90 TABLET | Refills: 1 | Status: SHIPPED | OUTPATIENT
Start: 2018-11-26 | End: 2019-05-30

## 2018-11-26 RX ORDER — ATORVASTATIN CALCIUM 10 MG/1
TABLET, FILM COATED ORAL
Qty: 90 TABLET | Refills: 0 | OUTPATIENT
Start: 2018-11-26

## 2018-11-26 NOTE — TELEPHONE ENCOUNTER
Medication(s) to Refill:   Requested Prescriptions     Pending Prescriptions Disp Refills   • LEVOTHYROXINE SODIUM 50 MCG Oral Tab [Pharmacy Med Name: Levothyroxine Sodium Oral Tablet 50 MCG] 90 tablet 1     Sig: TAKE 1 TABLET BY MOUTH IN THE MORNING   • A

## 2018-12-17 ENCOUNTER — OFFICE VISIT (OUTPATIENT)
Dept: INTERNAL MEDICINE CLINIC | Facility: CLINIC | Age: 83
End: 2018-12-17

## 2018-12-17 ENCOUNTER — TELEPHONE (OUTPATIENT)
Dept: INTERNAL MEDICINE CLINIC | Facility: CLINIC | Age: 83
End: 2018-12-17

## 2018-12-17 ENCOUNTER — HOSPITAL ENCOUNTER (OUTPATIENT)
Dept: GENERAL RADIOLOGY | Age: 83
Discharge: HOME | End: 2018-12-17
Attending: INTERNAL MEDICINE
Payer: MEDICARE

## 2018-12-17 VITALS
HEIGHT: 64 IN | DIASTOLIC BLOOD PRESSURE: 78 MMHG | OXYGEN SATURATION: 96 % | BODY MASS INDEX: 28.85 KG/M2 | HEART RATE: 62 BPM | SYSTOLIC BLOOD PRESSURE: 138 MMHG | WEIGHT: 169 LBS | RESPIRATION RATE: 12 BRPM | TEMPERATURE: 98 F

## 2018-12-17 DIAGNOSIS — M25.551 PAIN OF RIGHT HIP JOINT: ICD-10-CM

## 2018-12-17 DIAGNOSIS — M25.551 PAIN OF RIGHT HIP JOINT: Primary | ICD-10-CM

## 2018-12-17 PROCEDURE — 73502 X-RAY EXAM HIP UNI 2-3 VIEWS: CPT | Performed by: INTERNAL MEDICINE

## 2018-12-17 PROCEDURE — 99213 OFFICE O/P EST LOW 20 MIN: CPT | Performed by: INTERNAL MEDICINE

## 2018-12-17 NOTE — TELEPHONE ENCOUNTER
Wife called and stated that her  is having left hip pain and wants to know if the patient could be seen today by Dr. Escobedo Loud or if he could just order an X ray. Please advise.

## 2018-12-17 NOTE — TELEPHONE ENCOUNTER
Spoke with pt's wife. She stated 2 weeks ago they went to [employer] [place] [employer] and he was off balance and fell coming out of there. Pt wife stated 2 or 3 days ago he had some pain in his right hip and thigh. Pt wife stated that he did not hit his head.  Pt wife negative...

## 2018-12-17 NOTE — PROGRESS NOTES
Stacie Shay  1926 is a 80year old male. Patient presents with:  Hip Pain      HPI:   C/o pain in hip right     Injury patient fell on it about 2 weeks ago lost his footing landed on the right side  Swelling  neg  Weakness.  neg HIP:  INSPECTION:normal  WOUNDS: no wounds appreciated. PALPATION: Tender over the greater trochanter  STABILITY: no instability. VASCULAR: normal  STRENGTH: 5/5 all motor groups. SENSATION: intact to light touch.    ROM: no pain with full range

## 2018-12-19 ENCOUNTER — TELEPHONE (OUTPATIENT)
Dept: INTERNAL MEDICINE CLINIC | Facility: CLINIC | Age: 83
End: 2018-12-19

## 2019-01-21 ENCOUNTER — TELEPHONE (OUTPATIENT)
Dept: INTERNAL MEDICINE CLINIC | Facility: CLINIC | Age: 84
End: 2019-01-21

## 2019-01-21 DIAGNOSIS — H61.20 EXCESSIVE CERUMEN IN EAR CANAL, UNSPECIFIED LATERALITY: Primary | ICD-10-CM

## 2019-01-21 NOTE — TELEPHONE ENCOUNTER
Per pt spouse pt needs referral to ENT for excessive ear wax. She is requesting referral to [facility] in [place]. Referral pended for Dr. Ramon Rahman. Please advise.

## 2019-01-24 NOTE — TELEPHONE ENCOUNTER
Pt spouse notified referral authorized for Dr. Gerson Hennessy. Contact information given to pt spouse. Pt spouse verbalized understanding.

## 2019-02-27 DIAGNOSIS — I10 ESSENTIAL HYPERTENSION: ICD-10-CM

## 2019-02-28 RX ORDER — METOPROLOL SUCCINATE 25 MG/1
TABLET, EXTENDED RELEASE ORAL
Qty: 90 TABLET | Refills: 0 | Status: SHIPPED | OUTPATIENT
Start: 2019-02-28 | End: 2019-05-30

## 2019-02-28 RX ORDER — RANITIDINE 150 MG/1
TABLET ORAL
Qty: 90 TABLET | Refills: 0 | Status: SHIPPED | OUTPATIENT
Start: 2019-02-28 | End: 2019-05-30

## 2019-02-28 NOTE — TELEPHONE ENCOUNTER
Protocol passed     Medication(s) to Refill:   Requested Prescriptions     Pending Prescriptions Disp Refills   • METOPROLOL SUCCINATE ER 25 MG Oral Tablet 24 Hr [Pharmacy Med Name: Metoprolol Succinate ER Oral Tablet Extended Release 24 Hour 25 MG] 90 tab

## 2019-03-01 DIAGNOSIS — I10 ESSENTIAL HYPERTENSION: ICD-10-CM

## 2019-03-02 RX ORDER — ATORVASTATIN CALCIUM 10 MG/1
TABLET, FILM COATED ORAL
Qty: 90 TABLET | Refills: 0 | OUTPATIENT
Start: 2019-03-02

## 2019-03-02 RX ORDER — RANITIDINE 150 MG/1
TABLET ORAL
Qty: 90 TABLET | Refills: 0 | OUTPATIENT
Start: 2019-03-02

## 2019-03-02 RX ORDER — METOPROLOL SUCCINATE 25 MG/1
TABLET, EXTENDED RELEASE ORAL
Qty: 90 TABLET | Refills: 0 | OUTPATIENT
Start: 2019-03-02

## 2019-03-02 NOTE — TELEPHONE ENCOUNTER
Atorvastatin discontinued 4/2018 due to Adverse Reaction .  (Per last refill)     Metoprolol filled 2/28/19 #90    Ranitidine last filled 2/28/19 #90

## 2019-03-04 DIAGNOSIS — I10 ESSENTIAL HYPERTENSION: ICD-10-CM

## 2019-03-05 RX ORDER — METOPROLOL SUCCINATE 25 MG/1
TABLET, EXTENDED RELEASE ORAL
Qty: 90 TABLET | Refills: 0 | OUTPATIENT
Start: 2019-03-05

## 2019-03-21 ENCOUNTER — OFFICE VISIT (OUTPATIENT)
Dept: INTERNAL MEDICINE CLINIC | Facility: CLINIC | Age: 84
End: 2019-03-21
Payer: MEDICARE

## 2019-03-21 VITALS
DIASTOLIC BLOOD PRESSURE: 78 MMHG | TEMPERATURE: 98 F | SYSTOLIC BLOOD PRESSURE: 136 MMHG | HEART RATE: 88 BPM | BODY MASS INDEX: 27.36 KG/M2 | OXYGEN SATURATION: 96 % | RESPIRATION RATE: 20 BRPM | HEIGHT: 64 IN | WEIGHT: 160.25 LBS

## 2019-03-21 DIAGNOSIS — E78.00 HYPERCHOLESTEREMIA: ICD-10-CM

## 2019-03-21 DIAGNOSIS — G62.9 NEUROPATHY: ICD-10-CM

## 2019-03-21 DIAGNOSIS — G30.1 LATE ONSET ALZHEIMER'S DISEASE WITHOUT BEHAVIORAL DISTURBANCE (HCC): ICD-10-CM

## 2019-03-21 DIAGNOSIS — E03.9 HYPOTHYROIDISM (ACQUIRED): ICD-10-CM

## 2019-03-21 DIAGNOSIS — I10 ESSENTIAL HYPERTENSION: ICD-10-CM

## 2019-03-21 DIAGNOSIS — F02.80 LATE ONSET ALZHEIMER'S DISEASE WITHOUT BEHAVIORAL DISTURBANCE (HCC): ICD-10-CM

## 2019-03-21 DIAGNOSIS — R97.20 ELEVATED PSA, LESS THAN 10 NG/ML: ICD-10-CM

## 2019-03-21 DIAGNOSIS — Z00.00 ROUTINE GENERAL MEDICAL EXAMINATION AT A HEALTH CARE FACILITY: Primary | ICD-10-CM

## 2019-03-21 DIAGNOSIS — N31.9 NEUROGENIC BLADDER: ICD-10-CM

## 2019-03-21 PROCEDURE — G0439 PPPS, SUBSEQ VISIT: HCPCS | Performed by: INTERNAL MEDICINE

## 2019-03-21 PROCEDURE — 99397 PER PM REEVAL EST PAT 65+ YR: CPT | Performed by: INTERNAL MEDICINE

## 2019-03-21 PROCEDURE — 96160 PT-FOCUSED HLTH RISK ASSMT: CPT | Performed by: INTERNAL MEDICINE

## 2019-03-21 PROCEDURE — 93000 ELECTROCARDIOGRAM COMPLETE: CPT | Performed by: INTERNAL MEDICINE

## 2019-03-21 NOTE — PROGRESS NOTES
REASON FOR VISIT:    Merlin Fleck is a 80year old male who presents for a Medicare Annual Wellness visit.     Male      Patient Care Team: Patient Care Team:  Blank Ritter MD as PCP - General (Internal Medicine)  Blank Ritter MD as PCP (Inter 21 24 19 20   Creatinine 0.70 - 1.30 mg/dL 1.05 0.98 1.05 1.01 0.98 1.11 1.1   Some recent data might be hidden     AST and ALT Latest Ref Rng & Units 3/24/2018 4/4/2017 10/21/2016 10/9/2015 4/7/2015 5/23/2014 8/15/2013   AST 15 - 41 U/L 23 24 25 - 25 24 2 Screening     Colonoscopy Screen every 10 years There are no preventive care reminders to display for this patient. Flex Sigmoidoscopy Screen every 5 years No results found for this or any previous visit.     Fecal Occult Blood Annually No results found 11/11/2003      TYPHOID               11/22/2008 11/17/2011        SOCIAL HISTORY:   Social History    Tobacco Use      Smoking status: Former Smoker        Types: Cigarettes, Cigars      Smokeless tobacco: Never Used      Tobacco comment: quit many years self catheterization following TURP surgery with subsequent stricture -now better   Musculoskeletal:   Patient denies arthritis ,, muscle weakness . Joint pain knees and back occ. Joint stiffness none.    Peripheral Vascular:   General no varicosities, no c sounds: normal.   Murmurs: none. Rhythm: regular. LUNGS:   Auscultation: clear . Chest Shape: normal .   Percussion: normal.   Rales: no .   Respiratory effort: normal .   Rhonchi: no.   Wheezes: no.    ABDOMEN:   Bowel sounds: normal.   General: norm neurology    Hypercholesteremia stable  -     LIPID PANEL; Future    Neurogenic bladder tonic managed by urology    Hypothyroidism (acquired) stable on meds  -     T4(THYROXINE TOTAL);  Future  -     ASSAY, THYROID STIM HORMONE; Future    Elevated PSA, less

## 2019-05-04 ENCOUNTER — APPOINTMENT (OUTPATIENT)
Dept: CT IMAGING | Facility: HOSPITAL | Age: 84
End: 2019-05-04
Attending: EMERGENCY MEDICINE
Payer: MEDICARE

## 2019-05-04 ENCOUNTER — HOSPITAL ENCOUNTER (EMERGENCY)
Facility: HOSPITAL | Age: 84
Discharge: HOME | End: 2019-05-04
Attending: EMERGENCY MEDICINE
Payer: MEDICARE

## 2019-05-04 VITALS
WEIGHT: 165 LBS | TEMPERATURE: 96 F | BODY MASS INDEX: 27.49 KG/M2 | HEART RATE: 78 BPM | HEIGHT: 65 IN | OXYGEN SATURATION: 97 % | DIASTOLIC BLOOD PRESSURE: 96 MMHG | SYSTOLIC BLOOD PRESSURE: 170 MMHG | RESPIRATION RATE: 20 BRPM

## 2019-05-04 DIAGNOSIS — S00.83XA CONTUSION OF FACE, INITIAL ENCOUNTER: Primary | ICD-10-CM

## 2019-05-04 PROCEDURE — 76377 3D RENDER W/INTRP POSTPROCES: CPT | Performed by: EMERGENCY MEDICINE

## 2019-05-04 PROCEDURE — 36415 COLL VENOUS BLD VENIPUNCTURE: CPT | Performed by: EMERGENCY MEDICINE

## 2019-05-04 PROCEDURE — 70450 CT HEAD/BRAIN W/O DYE: CPT | Performed by: EMERGENCY MEDICINE

## 2019-05-04 PROCEDURE — 80053 COMPREHEN METABOLIC PANEL: CPT | Performed by: EMERGENCY MEDICINE

## 2019-05-04 PROCEDURE — 93010 ELECTROCARDIOGRAM REPORT: CPT | Performed by: EMERGENCY MEDICINE

## 2019-05-04 PROCEDURE — 99284 EMERGENCY DEPT VISIT MOD MDM: CPT | Performed by: EMERGENCY MEDICINE

## 2019-05-04 PROCEDURE — 93005 ELECTROCARDIOGRAM TRACING: CPT

## 2019-05-04 PROCEDURE — 70486 CT MAXILLOFACIAL W/O DYE: CPT | Performed by: EMERGENCY MEDICINE

## 2019-05-04 PROCEDURE — 99285 EMERGENCY DEPT VISIT HI MDM: CPT | Performed by: EMERGENCY MEDICINE

## 2019-05-04 PROCEDURE — 85025 COMPLETE CBC W/AUTO DIFF WBC: CPT | Performed by: EMERGENCY MEDICINE

## 2019-05-04 NOTE — ED PROVIDER NOTES
Patient Seen in: [facility] Emergency Department    History   Patient presents with:  Fall (musculoskeletal, neurologic)    Stated Complaint: Syncope    HPI    This is a 51-year-old male complaining of facial injury.   Patient was walking around the Triage Vitals [05/04/19 1714]   /62   Pulse 55   Resp 16   Temp (!) 96 °F (35.6 °C)   Temp src Temporal   SpO2 96 %   O2 Device None (Room air)       Current:BP (!) 166/89   Pulse 75   Temp (!) 96 °F (35.6 °C) (Temporal)   Resp 20   Ht 165.1 cm (5' 5 Status                     ---------                               -----------         ------                     CBC W/ DIFFERENTIAL[[id number]]                              Final result                 Please view results for these tests on the individ

## 2019-05-04 NOTE — ED INITIAL ASSESSMENT (HPI)
Pt to ER via EMS s/p syncopal episode while walking today. C-collar in place upon arrival. Skin tear to right hand and abrasions to right face noted. Bleeding controlled. Pt reports dizziness prior to fall.

## 2019-05-30 DIAGNOSIS — I10 ESSENTIAL HYPERTENSION: ICD-10-CM

## 2019-05-31 RX ORDER — METOPROLOL SUCCINATE 25 MG/1
TABLET, EXTENDED RELEASE ORAL
Qty: 90 TABLET | Refills: 0 | Status: SHIPPED | OUTPATIENT
Start: 2019-05-31 | End: 2019-06-18

## 2019-05-31 RX ORDER — LEVOTHYROXINE SODIUM 0.05 MG/1
TABLET ORAL
Qty: 90 TABLET | Refills: 0 | Status: SHIPPED | OUTPATIENT
Start: 2019-05-31 | End: 2020-05-06

## 2019-05-31 RX ORDER — RANITIDINE 150 MG/1
TABLET ORAL
Qty: 90 TABLET | Refills: 0 | Status: SHIPPED | OUTPATIENT
Start: 2019-05-31 | End: 2020-07-23

## 2019-05-31 RX ORDER — ATORVASTATIN CALCIUM 10 MG/1
TABLET, FILM COATED ORAL
Qty: 90 TABLET | Refills: 0 | OUTPATIENT
Start: 2019-05-31

## 2019-05-31 NOTE — TELEPHONE ENCOUNTER
Failed protocol - Levothyroxine + Atorvastatin  Passed protocol - Ranitidine + Metoprolol    Last refill:  2/28/2019 Metoprolol ER 25 mg #90 NF  2/28/2019 Ranitidine 150 mg #90 NR  11/26/2018 Levothyroxine 50 mcg #90 NR      4/11/2018 Atorvastatin 10 mg  -

## 2019-06-18 ENCOUNTER — OFFICE VISIT (OUTPATIENT)
Dept: INTERNAL MEDICINE CLINIC | Facility: CLINIC | Age: 84
End: 2019-06-18
Payer: MEDICARE

## 2019-06-18 VITALS
DIASTOLIC BLOOD PRESSURE: 84 MMHG | HEIGHT: 64 IN | SYSTOLIC BLOOD PRESSURE: 122 MMHG | BODY MASS INDEX: 29.15 KG/M2 | OXYGEN SATURATION: 97 % | HEART RATE: 74 BPM | RESPIRATION RATE: 12 BRPM | TEMPERATURE: 98 F | WEIGHT: 170.75 LBS

## 2019-06-18 DIAGNOSIS — I10 ESSENTIAL HYPERTENSION: Primary | ICD-10-CM

## 2019-06-18 PROBLEM — R97.20 ELEVATED PSA, LESS THAN 10 NG/ML: Chronic | Status: ACTIVE | Noted: 2018-04-17

## 2019-06-18 PROBLEM — G62.9 NEUROPATHY: Chronic | Status: ACTIVE | Noted: 2017-04-10

## 2019-06-18 PROCEDURE — 99213 OFFICE O/P EST LOW 20 MIN: CPT | Performed by: INTERNAL MEDICINE

## 2019-06-18 NOTE — PATIENT INSTRUCTIONS
HTN  Monitor blood pressure twice a day and send or call office with readings.   Proceed with blood work as outlined during CPX

## 2019-06-18 NOTE — PROGRESS NOTES
Pillo Perez [facility] 1926 is a 80year old male. Patient presents with:  Hypertension       HPI:   BP check.     Since stopped of his BP meds blood pressure numbers at home are good    Current Outpatient Medications:  LEVOTHYROXINE SODIUM 50 MCG unremarkable. Nasal septum: midline. Pharynx: normal.   Sinuses: non-tender. HEART:   Clicks: no.   Distal Pulses Palpable: yes. Edema: none visible . Gallop: no .   Heart sounds: normal S1S2. Murmurs: none. Rhythm: regular.    LUNGS:   Airflo

## 2019-06-19 ENCOUNTER — LAB ENCOUNTER (OUTPATIENT)
Dept: LAB | Age: 84
End: 2019-06-19
Attending: INTERNAL MEDICINE
Payer: MEDICARE

## 2019-06-19 DIAGNOSIS — R97.20 ELEVATED PSA, LESS THAN 10 NG/ML: ICD-10-CM

## 2019-06-19 DIAGNOSIS — Z00.00 ROUTINE GENERAL MEDICAL EXAMINATION AT A HEALTH CARE FACILITY: ICD-10-CM

## 2019-06-19 DIAGNOSIS — E78.00 HYPERCHOLESTEREMIA: ICD-10-CM

## 2019-06-19 DIAGNOSIS — E03.9 HYPOTHYROIDISM (ACQUIRED): ICD-10-CM

## 2019-06-19 PROCEDURE — 84443 ASSAY THYROID STIM HORMONE: CPT

## 2019-06-19 PROCEDURE — 84436 ASSAY OF TOTAL THYROXINE: CPT

## 2019-06-19 PROCEDURE — 80061 LIPID PANEL: CPT

## 2019-06-19 PROCEDURE — 36415 COLL VENOUS BLD VENIPUNCTURE: CPT

## 2019-06-19 PROCEDURE — 83036 HEMOGLOBIN GLYCOSYLATED A1C: CPT

## 2019-06-19 PROCEDURE — 85025 COMPLETE CBC W/AUTO DIFF WBC: CPT

## 2019-06-19 PROCEDURE — 84153 ASSAY OF PSA TOTAL: CPT

## 2019-06-19 PROCEDURE — 80053 COMPREHEN METABOLIC PANEL: CPT

## 2019-06-21 ENCOUNTER — TELEPHONE (OUTPATIENT)
Dept: INTERNAL MEDICINE CLINIC | Facility: CLINIC | Age: 84
End: 2019-06-21

## 2019-06-21 NOTE — TELEPHONE ENCOUNTER
Let patient know that his LDL and PSA is elevated however in view of his advanced age, if he wants to proceed with further work-up he should come by and see me  Or he could see his urologist  At this age I would not recommend treating his LDL

## 2019-07-30 ENCOUNTER — OFFICE VISIT (OUTPATIENT)
Dept: INTERNAL MEDICINE CLINIC | Facility: CLINIC | Age: 84
End: 2019-07-30
Payer: MEDICARE

## 2019-07-30 VITALS
HEIGHT: 64 IN | TEMPERATURE: 99 F | HEART RATE: 76 BPM | BODY MASS INDEX: 28.77 KG/M2 | DIASTOLIC BLOOD PRESSURE: 86 MMHG | SYSTOLIC BLOOD PRESSURE: 128 MMHG | WEIGHT: 168.5 LBS | RESPIRATION RATE: 16 BRPM | OXYGEN SATURATION: 95 %

## 2019-07-30 DIAGNOSIS — N30.00 ACUTE CYSTITIS WITHOUT HEMATURIA: ICD-10-CM

## 2019-07-30 DIAGNOSIS — L85.3 DRY SKIN DERMATITIS: Primary | ICD-10-CM

## 2019-07-30 PROCEDURE — 99213 OFFICE O/P EST LOW 20 MIN: CPT | Performed by: INTERNAL MEDICINE

## 2019-07-30 NOTE — PATIENT INSTRUCTIONS
Recommend topical moisturizing cream, baby oil increase hydration  Await urine prior to further recommendations

## 2019-07-30 NOTE — PROGRESS NOTES
Dani Malin  1926 is a 80year old male. Patient presents with:  Referral      HPI:   Complains of itchy skin in both legs.   Also wants a urine test in view of a little foul-smelling urine    Current Outpatient Medications:  LEVOTHYROXI ROUTINE; Future        Patient Instructions   Recommend topical moisturizing cream, baby oil increase hydration  Await urine prior to further recommendations     The patient indicates understanding of these issues and agrees to the plan.   The patient is as

## 2019-07-31 ENCOUNTER — APPOINTMENT (OUTPATIENT)
Dept: LAB | Age: 84
End: 2019-07-31
Attending: INTERNAL MEDICINE
Payer: MEDICARE

## 2019-07-31 DIAGNOSIS — N30.00 ACUTE CYSTITIS WITHOUT HEMATURIA: ICD-10-CM

## 2019-07-31 LAB
BILIRUB UR QL STRIP.AUTO: NEGATIVE
COLOR UR AUTO: YELLOW
GLUCOSE UR STRIP.AUTO-MCNC: NEGATIVE MG/DL
KETONES UR STRIP.AUTO-MCNC: NEGATIVE MG/DL
NITRITE UR QL STRIP.AUTO: NEGATIVE
PH UR STRIP.AUTO: 7.5 [PH] (ref 4.5–8)
SP GR UR STRIP.AUTO: 1.01 (ref 1–1.03)
UROBILINOGEN UR STRIP.AUTO-MCNC: 4 MG/DL
WBC CLUMPS UR QL AUTO: PRESENT

## 2019-07-31 PROCEDURE — 87077 CULTURE AEROBIC IDENTIFY: CPT

## 2019-07-31 PROCEDURE — 87086 URINE CULTURE/COLONY COUNT: CPT

## 2019-07-31 PROCEDURE — 81001 URINALYSIS AUTO W/SCOPE: CPT

## 2019-09-16 RX ORDER — LEVOTHYROXINE SODIUM 0.05 MG/1
TABLET ORAL
Qty: 90 TABLET | Refills: 0 | Status: SHIPPED | OUTPATIENT
Start: 2019-09-16 | End: 2019-10-17

## 2019-09-16 NOTE — TELEPHONE ENCOUNTER
Passed protocol    Medication(s) to Refill:   Requested Prescriptions     Pending Prescriptions Disp Refills   • LEVOTHYROXINE SODIUM 50 MCG Oral Tab [Pharmacy Med Name: Levothyroxine Sodium Oral Tablet 50 MCG] 90 tablet 0     Sig: TAKE 1 TABLET BY MOUTH I

## 2019-10-15 ENCOUNTER — TELEPHONE (OUTPATIENT)
Dept: INTERNAL MEDICINE CLINIC | Facility: CLINIC | Age: 84
End: 2019-10-15

## 2019-10-15 RX ORDER — AMOXICILLIN AND CLAVULANATE POTASSIUM 500; 125 MG/1; MG/1
1 TABLET, FILM COATED ORAL 2 TIMES DAILY
Qty: 20 TABLET | Refills: 0 | Status: SHIPPED | OUTPATIENT
Start: 2019-10-15 | End: 2019-10-17 | Stop reason: ALTCHOICE

## 2019-10-15 NOTE — TELEPHONE ENCOUNTER
Patient can come to see me today. Will see me on Thursday.   Please let patient know prescription sent

## 2019-10-15 NOTE — TELEPHONE ENCOUNTER
Pt spouse states pt with cough x2 weeks, some phlem, some nasal congestion, denies fever, SOB or wheezing

## 2019-10-16 NOTE — TELEPHONE ENCOUNTER
LM for pt spouse informing of abx and appt time    Future Appointments   Date Time Provider Betty Gomez   10/17/2019 10:15 AM Rodrick Tyler MD [facility] [place]

## 2019-10-17 ENCOUNTER — OFFICE VISIT (OUTPATIENT)
Dept: INTERNAL MEDICINE CLINIC | Facility: CLINIC | Age: 84
End: 2019-10-17
Payer: MEDICARE

## 2019-10-17 VITALS
RESPIRATION RATE: 18 BRPM | DIASTOLIC BLOOD PRESSURE: 90 MMHG | HEART RATE: 62 BPM | OXYGEN SATURATION: 96 % | HEIGHT: 64 IN | WEIGHT: 164.25 LBS | TEMPERATURE: 98 F | SYSTOLIC BLOOD PRESSURE: 144 MMHG | BODY MASS INDEX: 28.04 KG/M2

## 2019-10-17 DIAGNOSIS — R05.9 COUGH: Primary | ICD-10-CM

## 2019-10-17 PROCEDURE — 99213 OFFICE O/P EST LOW 20 MIN: CPT | Performed by: INTERNAL MEDICINE

## 2019-10-17 RX ORDER — AMOXICILLIN AND CLAVULANATE POTASSIUM 500; 125 MG/1; MG/1
1 TABLET, FILM COATED ORAL 2 TIMES DAILY
Qty: 20 TABLET | Refills: 0 | Status: SHIPPED | OUTPATIENT
Start: 2019-10-17 | End: 2019-10-27

## 2019-10-17 RX ORDER — CODEINE PHOSPHATE AND GUAIFENESIN 10; 100 MG/5ML; MG/5ML
5 SOLUTION ORAL EVERY 6 HOURS PRN
Qty: 240 ML | Refills: 0 | Status: SHIPPED | OUTPATIENT
Start: 2019-10-17 | End: 2019-10-27

## 2019-10-17 RX ORDER — ALBUTEROL SULFATE 90 UG/1
2 AEROSOL, METERED RESPIRATORY (INHALATION) EVERY 6 HOURS PRN
Qty: 2 INHALER | Refills: 0 | Status: SHIPPED | OUTPATIENT
Start: 2019-10-17 | End: 2019-11-16

## 2019-10-17 NOTE — PROGRESS NOTES
Shelbi Lerner  1926 is a 80year old male who presents for upper respiratory symptoms    Patient presents with:  URI        HPI:   Pt reports  respiratory symptoms for  Few days   Some how  patient did not get his prescriptions filled.      A HEENT:neg  LUNGS: denies shortness of breath,chest pain,  CARDIOVASCULAR: denies chest pain on exertion  GI: no nausea or abdominal pain  NEURO: denies headaches    EXAM:   /90   Pulse 62   Temp 98.4 °F (36.9 °C) (Oral)   Resp 18   SpO2 96%   GENER

## 2019-12-10 RX ORDER — ATORVASTATIN CALCIUM 10 MG/1
TABLET, FILM COATED ORAL
Qty: 90 TABLET | Refills: 0 | OUTPATIENT
Start: 2019-12-10

## 2019-12-31 ENCOUNTER — OFFICE VISIT (OUTPATIENT)
Dept: INTERNAL MEDICINE CLINIC | Facility: CLINIC | Age: 84
End: 2019-12-31
Payer: MEDICARE

## 2019-12-31 VITALS
SYSTOLIC BLOOD PRESSURE: 126 MMHG | HEART RATE: 82 BPM | BODY MASS INDEX: 29 KG/M2 | RESPIRATION RATE: 18 BRPM | OXYGEN SATURATION: 94 % | WEIGHT: 167 LBS | TEMPERATURE: 100 F | DIASTOLIC BLOOD PRESSURE: 68 MMHG

## 2019-12-31 DIAGNOSIS — R05.9 COUGH: Primary | ICD-10-CM

## 2019-12-31 LAB
FLUAV + FLUBV RNA SPEC NAA+PROBE: NEGATIVE
FLUAV + FLUBV RNA SPEC NAA+PROBE: NEGATIVE
FLUAV + FLUBV RNA SPEC NAA+PROBE: POSITIVE

## 2019-12-31 PROCEDURE — 99213 OFFICE O/P EST LOW 20 MIN: CPT | Performed by: INTERNAL MEDICINE

## 2019-12-31 PROCEDURE — 87502 INFLUENZA DNA AMP PROBE: CPT | Performed by: INTERNAL MEDICINE

## 2019-12-31 PROCEDURE — 87798 DETECT AGENT NOS DNA AMP: CPT | Performed by: INTERNAL MEDICINE

## 2019-12-31 RX ORDER — CODEINE PHOSPHATE AND GUAIFENESIN 10; 100 MG/5ML; MG/5ML
5 SOLUTION ORAL EVERY 6 HOURS PRN
Qty: 240 ML | Refills: 0 | Status: SHIPPED | OUTPATIENT
Start: 2019-12-31 | End: 2020-01-10

## 2019-12-31 RX ORDER — AMOXICILLIN AND CLAVULANATE POTASSIUM 500; 125 MG/1; MG/1
1 TABLET, FILM COATED ORAL 2 TIMES DAILY
Qty: 20 TABLET | Refills: 0 | Status: SHIPPED | OUTPATIENT
Start: 2019-12-31 | End: 2020-01-10

## 2019-12-31 RX ORDER — ALBUTEROL SULFATE 90 UG/1
2 AEROSOL, METERED RESPIRATORY (INHALATION) EVERY 6 HOURS PRN
Qty: 2 INHALER | Refills: 0 | Status: SHIPPED | OUTPATIENT
Start: 2019-12-31 | End: 2020-01-30

## 2019-12-31 NOTE — PROGRESS NOTES
[facility]  1926 is a 80year old male who presents for upper respiratory symptoms    Patient presents with:  Physical        HPI:   Pt reports  respiratory symptoms for cough runny nose sneezing fever no body aches 3 days .   Patient 30 m rashes  EYES:denies eye pain,discharge   HEENT: see above   LUNGS: denies shortness of breathexpectoration,chest pain,wheezing  CARDIOVASCULAR: denies chest pain on exertion  GI: no nausea or abdominal pain  NEURO: denies headaches    EXAM:   /68   P

## 2020-01-01 RX ORDER — OSELTAMIVIR PHOSPHATE 75 MG/1
75 CAPSULE ORAL 2 TIMES DAILY
Qty: 10 CAPSULE | Refills: 0 | Status: SHIPPED | OUTPATIENT
Start: 2020-01-01 | End: 2020-01-06

## 2020-05-06 RX ORDER — LEVOTHYROXINE SODIUM 0.05 MG/1
TABLET ORAL
Qty: 90 TABLET | Refills: 0 | Status: SHIPPED | OUTPATIENT
Start: 2020-05-06 | End: 2020-08-17

## 2020-07-23 ENCOUNTER — OFFICE VISIT (OUTPATIENT)
Dept: INTERNAL MEDICINE CLINIC | Facility: CLINIC | Age: 85
End: 2020-07-23
Payer: MEDICARE

## 2020-07-23 VITALS
HEART RATE: 60 BPM | RESPIRATION RATE: 12 BRPM | WEIGHT: 160 LBS | OXYGEN SATURATION: 95 % | TEMPERATURE: 98 F | HEIGHT: 64 IN | DIASTOLIC BLOOD PRESSURE: 76 MMHG | SYSTOLIC BLOOD PRESSURE: 114 MMHG | BODY MASS INDEX: 27.31 KG/M2

## 2020-07-23 DIAGNOSIS — R97.20 ELEVATED PSA, LESS THAN 10 NG/ML: Chronic | ICD-10-CM

## 2020-07-23 DIAGNOSIS — F02.80 LATE ONSET ALZHEIMER'S DISEASE WITHOUT BEHAVIORAL DISTURBANCE (HCC): ICD-10-CM

## 2020-07-23 DIAGNOSIS — G30.1 LATE ONSET ALZHEIMER'S DISEASE WITHOUT BEHAVIORAL DISTURBANCE (HCC): ICD-10-CM

## 2020-07-23 DIAGNOSIS — G62.9 NEUROPATHY: Chronic | ICD-10-CM

## 2020-07-23 DIAGNOSIS — E78.00 HYPERCHOLESTEREMIA: Chronic | ICD-10-CM

## 2020-07-23 DIAGNOSIS — E03.9 HYPOTHYROIDISM (ACQUIRED): Chronic | ICD-10-CM

## 2020-07-23 DIAGNOSIS — N31.9 NEUROGENIC BLADDER: Chronic | ICD-10-CM

## 2020-07-23 DIAGNOSIS — Z00.00 ROUTINE GENERAL MEDICAL EXAMINATION AT A HEALTH CARE FACILITY: Primary | ICD-10-CM

## 2020-07-23 PROCEDURE — 3074F SYST BP LT 130 MM HG: CPT | Performed by: INTERNAL MEDICINE

## 2020-07-23 PROCEDURE — 90471 IMMUNIZATION ADMIN: CPT | Performed by: INTERNAL MEDICINE

## 2020-07-23 PROCEDURE — 96160 PT-FOCUSED HLTH RISK ASSMT: CPT | Performed by: INTERNAL MEDICINE

## 2020-07-23 PROCEDURE — G0439 PPPS, SUBSEQ VISIT: HCPCS | Performed by: INTERNAL MEDICINE

## 2020-07-23 PROCEDURE — 3078F DIAST BP <80 MM HG: CPT | Performed by: INTERNAL MEDICINE

## 2020-07-23 PROCEDURE — 90750 HZV VACC RECOMBINANT IM: CPT | Performed by: INTERNAL MEDICINE

## 2020-07-23 PROCEDURE — 99397 PER PM REEVAL EST PAT 65+ YR: CPT | Performed by: INTERNAL MEDICINE

## 2020-07-23 PROCEDURE — 3008F BODY MASS INDEX DOCD: CPT | Performed by: INTERNAL MEDICINE

## 2020-07-23 RX ORDER — MULTIVIT WITH MINERALS/LUTEIN
1000 TABLET ORAL DAILY
COMMUNITY

## 2020-07-23 NOTE — PROGRESS NOTES
REASON FOR VISIT:    Sirena Krishnamurthy is a 80year old male who presents for a Medicare Annual Wellness visit.    male     Patient Care Team: Patient Care Team:  Mauro Carlin MD as PCP - General (Internal Medicine)  Mauro Carlin MD as PCP (Intern 10/9/2015 4/7/2015   AST 15 - 37 U/L 20 31 23 24 25 - 25   AST (SGOT) 10 - 35 U/L - - - - - 21 -   ALT 16 - 61 U/L 24 37 30 34 40 - 33   ALT (SGPT) 9 - 46 U/L - - - - - 24 -   Some recent data might be hidden     TSH and Free T4 Latest Ref Rng & Units 6/19 (P) 0-No  Do you accidently lose urine?: (P) 0-No  Do you have difficulty seeing?: (P) 0-No  Do you have any difficulty walking or getting up?: (P) 1-Yes  Do you have any tripping hazards?: (P) 0-No  Are you on multiple medications?: (P) 0-No  Does pain af this or any previous visit.     Fecal Occult Blood Annually No results found for: FOB, OCCULTSTOOL   Glaucoma Screening     Ophthalmology Visit Annually advised   Immunizations     Zoster (Not covered by Medicare Part B) No orders found for this or any prev HISTORY:   Social History    Tobacco Use      Smoking status: Never Smoker      Smokeless tobacco: Never Used      Tobacco comment: quit many years ago    Alcohol use:  Yes      Alcohol/week: 0.0 standard drinks      Comment: social    Drug use: No       RE needed   Musculoskeletal:   Patient denies arthritis ,, muscle weakness . Joint pain knees and back occ. Joint stiffness none. Peripheral Vascular:   General no varicosities, no claudication. Dermatologic:   Rash none.    Neurologic:   Patient denies di Murmurs: none. Rhythm: regular. LUNGS:   Auscultation: clear . Chest Shape: normal .   Percussion: normal.   Rales: no .   Respiratory effort: normal .   Rhonchi: no.   Wheezes: no.    ABDOMEN:   Bowel sounds: normal.   General: normal.   Hernia: ab neurology    Neurogenic bladder per urology stable  -     URINALYSIS, ROUTINE; Future    Neuropathy stable    Elevated PSA, less than 10 ng/ml -work-up deferred in view of advanced age    Other orders  -     Cancel: PSA, DIAGNOSTIC; Future  -     [facility]

## 2020-07-27 ENCOUNTER — LAB ENCOUNTER (OUTPATIENT)
Dept: LAB | Age: 85
End: 2020-07-27
Attending: INTERNAL MEDICINE
Payer: MEDICARE

## 2020-07-27 DIAGNOSIS — Z00.00 ROUTINE GENERAL MEDICAL EXAMINATION AT A HEALTH CARE FACILITY: ICD-10-CM

## 2020-07-27 DIAGNOSIS — N31.9 NEUROGENIC BLADDER: Chronic | ICD-10-CM

## 2020-07-27 DIAGNOSIS — E03.9 HYPOTHYROIDISM (ACQUIRED): Chronic | ICD-10-CM

## 2020-07-27 DIAGNOSIS — E78.00 HYPERCHOLESTEREMIA: Chronic | ICD-10-CM

## 2020-07-27 LAB
ALBUMIN SERPL-MCNC: 3.3 G/DL (ref 3.4–5)
ALBUMIN/GLOB SERPL: 0.9 {RATIO} (ref 1–2)
ALP LIVER SERPL-CCNC: 98 U/L (ref 45–117)
ALT SERPL-CCNC: 28 U/L (ref 16–61)
ANION GAP SERPL CALC-SCNC: 4 MMOL/L (ref 0–18)
AST SERPL-CCNC: 19 U/L (ref 15–37)
BASOPHILS # BLD AUTO: 0.03 X10(3) UL (ref 0–0.2)
BASOPHILS NFR BLD AUTO: 0.6 %
BILIRUB SERPL-MCNC: 1.2 MG/DL (ref 0.1–2)
BILIRUB UR QL STRIP.AUTO: NEGATIVE
BUN BLD-MCNC: 19 MG/DL (ref 7–18)
BUN/CREAT SERPL: 22.1 (ref 10–20)
CALCIUM BLD-MCNC: 9.1 MG/DL (ref 8.5–10.1)
CHLORIDE SERPL-SCNC: 106 MMOL/L (ref 98–112)
CHOLEST SMN-MCNC: 223 MG/DL (ref ?–200)
CO2 SERPL-SCNC: 30 MMOL/L (ref 21–32)
CREAT BLD-MCNC: 0.86 MG/DL (ref 0.7–1.3)
DEPRECATED RDW RBC AUTO: 45.1 FL (ref 35.1–46.3)
EOSINOPHIL # BLD AUTO: 0 X10(3) UL (ref 0–0.7)
EOSINOPHIL NFR BLD AUTO: 0 %
ERYTHROCYTE [DISTWIDTH] IN BLOOD BY AUTOMATED COUNT: 12.6 % (ref 11–15)
EST. AVERAGE GLUCOSE BLD GHB EST-MCNC: 114 MG/DL (ref 68–126)
GLOBULIN PLAS-MCNC: 3.8 G/DL (ref 2.8–4.4)
GLUCOSE BLD-MCNC: 103 MG/DL (ref 70–99)
GLUCOSE UR STRIP.AUTO-MCNC: NEGATIVE MG/DL
HBA1C MFR BLD HPLC: 5.6 % (ref ?–5.7)
HCT VFR BLD AUTO: 45.7 % (ref 39–53)
HDLC SERPL-MCNC: 40 MG/DL (ref 40–59)
HGB BLD-MCNC: 15.4 G/DL (ref 13–17.5)
IMM GRANULOCYTES # BLD AUTO: 0.03 X10(3) UL (ref 0–1)
IMM GRANULOCYTES NFR BLD: 0.6 %
KETONES UR STRIP.AUTO-MCNC: NEGATIVE MG/DL
LDLC SERPL CALC-MCNC: 164 MG/DL (ref ?–100)
LYMPHOCYTES # BLD AUTO: 1.05 X10(3) UL (ref 1–4)
LYMPHOCYTES NFR BLD AUTO: 22.7 %
M PROTEIN MFR SERPL ELPH: 7.1 G/DL (ref 6.4–8.2)
MCH RBC QN AUTO: 33 PG (ref 26–34)
MCHC RBC AUTO-ENTMCNC: 33.7 G/DL (ref 31–37)
MCV RBC AUTO: 98.1 FL (ref 80–100)
MONOCYTES # BLD AUTO: 0.41 X10(3) UL (ref 0.1–1)
MONOCYTES NFR BLD AUTO: 8.9 %
NEUTROPHILS # BLD AUTO: 3.1 X10 (3) UL (ref 1.5–7.7)
NEUTROPHILS # BLD AUTO: 3.1 X10(3) UL (ref 1.5–7.7)
NEUTROPHILS NFR BLD AUTO: 67.2 %
NITRITE UR QL STRIP.AUTO: NEGATIVE
NONHDLC SERPL-MCNC: 183 MG/DL (ref ?–130)
OSMOLALITY SERPL CALC.SUM OF ELEC: 293 MOSM/KG (ref 275–295)
PATIENT FASTING Y/N/NP: YES
PATIENT FASTING Y/N/NP: YES
PH UR STRIP.AUTO: 7 [PH] (ref 4.5–8)
PLATELET # BLD AUTO: 186 10(3)UL (ref 150–450)
POTASSIUM SERPL-SCNC: 3.8 MMOL/L (ref 3.5–5.1)
PROT UR STRIP.AUTO-MCNC: NEGATIVE MG/DL
RBC # BLD AUTO: 4.66 X10(6)UL (ref 3.8–5.8)
RBC UR QL AUTO: NEGATIVE
SODIUM SERPL-SCNC: 140 MMOL/L (ref 136–145)
SP GR UR STRIP.AUTO: 1.02 (ref 1–1.03)
TRIGL SERPL-MCNC: 93 MG/DL (ref 30–149)
TSI SER-ACNC: 2.83 MIU/ML (ref 0.36–3.74)
UROBILINOGEN UR STRIP.AUTO-MCNC: 4 MG/DL
VLDLC SERPL CALC-MCNC: 19 MG/DL (ref 0–30)
WBC # BLD AUTO: 4.6 X10(3) UL (ref 4–11)

## 2020-07-27 PROCEDURE — 83036 HEMOGLOBIN GLYCOSYLATED A1C: CPT

## 2020-07-27 PROCEDURE — 81001 URINALYSIS AUTO W/SCOPE: CPT

## 2020-07-27 PROCEDURE — 80053 COMPREHEN METABOLIC PANEL: CPT

## 2020-07-27 PROCEDURE — 36415 COLL VENOUS BLD VENIPUNCTURE: CPT

## 2020-07-27 PROCEDURE — 84443 ASSAY THYROID STIM HORMONE: CPT

## 2020-07-27 PROCEDURE — 80061 LIPID PANEL: CPT

## 2020-07-27 PROCEDURE — 85025 COMPLETE CBC W/AUTO DIFF WBC: CPT

## 2020-08-17 RX ORDER — LEVOTHYROXINE SODIUM 0.05 MG/1
TABLET ORAL
Qty: 90 TABLET | Refills: 0 | Status: SHIPPED | OUTPATIENT
Start: 2020-08-17 | End: 2021-09-29 | Stop reason: CLARIF

## 2020-08-17 NOTE — TELEPHONE ENCOUNTER
Passed protocol    Requesting LEVOTHYROXINE SODIUM 50 MCG Oral Tab  LOV: 7/23/20  RTC: 2 months  Last Relevant Labs: 7/27/20  Filled: 5/6/20 #90 with 0 refills    Future Appointments   Date Time Provider Betty Gomez   9/23/2020  4:30 PM [facility] 08 NURSE

## 2020-09-23 ENCOUNTER — TELEPHONE (OUTPATIENT)
Dept: INTERNAL MEDICINE CLINIC | Facility: CLINIC | Age: 85
End: 2020-09-23

## 2020-09-23 ENCOUNTER — NURSE ONLY (OUTPATIENT)
Dept: INTERNAL MEDICINE CLINIC | Facility: CLINIC | Age: 85
End: 2020-09-23
Payer: MEDICARE

## 2020-09-23 PROCEDURE — 90750 HZV VACC RECOMBINANT IM: CPT | Performed by: INTERNAL MEDICINE

## 2020-09-23 PROCEDURE — 90471 IMMUNIZATION ADMIN: CPT | Performed by: INTERNAL MEDICINE

## 2020-12-05 ENCOUNTER — PATIENT MESSAGE (OUTPATIENT)
Dept: INTERNAL MEDICINE CLINIC | Facility: CLINIC | Age: 85
End: 2020-12-05

## 2020-12-05 DIAGNOSIS — N30.00 ACUTE CYSTITIS WITHOUT HEMATURIA: Primary | ICD-10-CM

## 2020-12-07 NOTE — TELEPHONE ENCOUNTER
From: Sirena Krishnamurthy  To: Adin Gunter MD  Sent: 12/5/2020 8:28 PM CST  Subject: Howard Lucero is having low-grade fever since this morning. His urine is almost orange in color. I think he needs antibiotic immediately.

## 2020-12-08 ENCOUNTER — LAB ENCOUNTER (OUTPATIENT)
Dept: LAB | Age: 85
End: 2020-12-08
Attending: INTERNAL MEDICINE
Payer: MEDICARE

## 2020-12-08 DIAGNOSIS — N30.00 ACUTE CYSTITIS WITHOUT HEMATURIA: ICD-10-CM

## 2020-12-08 PROCEDURE — 87077 CULTURE AEROBIC IDENTIFY: CPT

## 2020-12-08 PROCEDURE — 87086 URINE CULTURE/COLONY COUNT: CPT

## 2020-12-08 PROCEDURE — 87186 SC STD MICRODIL/AGAR DIL: CPT

## 2020-12-08 PROCEDURE — 81001 URINALYSIS AUTO W/SCOPE: CPT

## 2020-12-08 NOTE — TELEPHONE ENCOUNTER
He needs to have a urinalysis and urine culture to be done since the patient has had numerous antibiotics in the past and he could be resistant.   An order for urinalysis and urine culture has been placed

## 2020-12-08 NOTE — TELEPHONE ENCOUNTER
Patient's wife Carlito Paredes provided with MD instructions listed below and verbalized understanding.

## 2020-12-08 NOTE — TELEPHONE ENCOUNTER
Patient's wife calling b/c she feels patient needs antibiotic asap; advised there is response from doctor and she tried to reach out already; please callback wife on her cell phone

## 2020-12-11 RX ORDER — CIPROFLOXACIN 500 MG/1
500 TABLET, FILM COATED ORAL 2 TIMES DAILY
Qty: 20 TABLET | Refills: 0 | Status: SHIPPED | OUTPATIENT
Start: 2020-12-11 | End: 2020-12-21

## 2020-12-17 ENCOUNTER — TELEPHONE (OUTPATIENT)
Dept: INTERNAL MEDICINE CLINIC | Facility: CLINIC | Age: 85
End: 2020-12-17

## 2021-03-05 ENCOUNTER — TELEPHONE (OUTPATIENT)
Dept: CASE MANAGEMENT | Age: 86
End: 2021-03-05

## 2021-03-12 DIAGNOSIS — Z23 NEED FOR VACCINATION: ICD-10-CM

## 2021-04-05 ENCOUNTER — TELEPHONE (OUTPATIENT)
Dept: CASE MANAGEMENT | Age: 86
End: 2021-04-05

## 2021-07-30 ENCOUNTER — HOSPITAL ENCOUNTER (OUTPATIENT)
Dept: GENERAL RADIOLOGY | Age: 86
Discharge: HOME | End: 2021-07-30
Attending: INTERNAL MEDICINE
Payer: MEDICARE

## 2021-07-30 ENCOUNTER — OFFICE VISIT (OUTPATIENT)
Dept: INTERNAL MEDICINE CLINIC | Facility: CLINIC | Age: 86
End: 2021-07-30
Payer: MEDICARE

## 2021-07-30 VITALS
DIASTOLIC BLOOD PRESSURE: 86 MMHG | BODY MASS INDEX: 27.59 KG/M2 | HEIGHT: 64 IN | TEMPERATURE: 98 F | SYSTOLIC BLOOD PRESSURE: 130 MMHG | RESPIRATION RATE: 14 BRPM | HEART RATE: 89 BPM | WEIGHT: 161.63 LBS | OXYGEN SATURATION: 95 %

## 2021-07-30 DIAGNOSIS — G30.9 AD (ALZHEIMER'S DISEASE) (HCC): Chronic | ICD-10-CM

## 2021-07-30 DIAGNOSIS — R05.9 COUGH: ICD-10-CM

## 2021-07-30 DIAGNOSIS — F02.80 AD (ALZHEIMER'S DISEASE) (HCC): Chronic | ICD-10-CM

## 2021-07-30 DIAGNOSIS — E03.9 HYPOTHYROIDISM (ACQUIRED): Chronic | ICD-10-CM

## 2021-07-30 DIAGNOSIS — G62.9 NEUROPATHY: Chronic | ICD-10-CM

## 2021-07-30 DIAGNOSIS — E78.00 HYPERCHOLESTEREMIA: Chronic | ICD-10-CM

## 2021-07-30 DIAGNOSIS — N31.9 NEUROGENIC BLADDER: Chronic | ICD-10-CM

## 2021-07-30 DIAGNOSIS — Z00.00 ROUTINE GENERAL MEDICAL EXAMINATION AT A HEALTH CARE FACILITY: Primary | ICD-10-CM

## 2021-07-30 DIAGNOSIS — R97.20 ELEVATED PSA, LESS THAN 10 NG/ML: Chronic | ICD-10-CM

## 2021-07-30 PROCEDURE — 3079F DIAST BP 80-89 MM HG: CPT | Performed by: INTERNAL MEDICINE

## 2021-07-30 PROCEDURE — G0439 PPPS, SUBSEQ VISIT: HCPCS | Performed by: INTERNAL MEDICINE

## 2021-07-30 PROCEDURE — 96160 PT-FOCUSED HLTH RISK ASSMT: CPT | Performed by: INTERNAL MEDICINE

## 2021-07-30 PROCEDURE — 3008F BODY MASS INDEX DOCD: CPT | Performed by: INTERNAL MEDICINE

## 2021-07-30 PROCEDURE — 3075F SYST BP GE 130 - 139MM HG: CPT | Performed by: INTERNAL MEDICINE

## 2021-07-30 PROCEDURE — 71046 X-RAY EXAM CHEST 2 VIEWS: CPT | Performed by: INTERNAL MEDICINE

## 2021-07-30 PROCEDURE — 99397 PER PM REEVAL EST PAT 65+ YR: CPT | Performed by: INTERNAL MEDICINE

## 2021-07-30 NOTE — PROGRESS NOTES
REASON FOR VISIT:    Leo Webb is a 80year old male who presents for a MA Supervisit.    male     Patient Care Team: Patient Care Team:  Ruben Cuevas MD as PCP - General (Internal Medicine)  Ruben Cuevas MD as PCP (Internal Medicine)  Ofelia Gonzalez 4/4/2017 10/21/2016 10/9/2015   AST 15 - 37 U/L 19 20 31 23 24 25 -   AST (SGOT) 10 - 35 U/L - - - - - - 21   ALT 16 - 61 U/L 28 24 37 30 34 40 -   ALT (SGPT) 9 - 46 U/L - - - - - - 24   Some recent data might be hidden     TSH and Free T4 Latest Ref Rng & healthcare power of ?: Yes  Do you have a living will?: Yes     Cognitive Assessment     What day of the week is this?: Incorrect  What month is it?: Incorrect  What year is it?: Incorrect  Recall \"Ball\": Incorrect  Recall \"Flag\":  Incorrect  Re Numbness in both legs 1/19/2016   • Osteoarthrosis, localized, primary, knee 12/15/2011   • Primary osteoarthritis of both knees 10/24/2016   • Radius fracture 11/18/2010   • Renal calculus, left 11/3/2015   • Retention of urine, unspecified     self cath hearing, no tinnitus. Nose and Sinuses no recurrent colds, no stuffiness, no discharge, no hay fever, no nosebleeds, no sinus trouble. Mouth and Pharynx no sore throats, no hoarseness. Neck no lumps, no goiter, no neck stiffness or pain.    Endocrine:   Mirian loss none.   EXAM:   /86   Pulse 89   Temp 98.2 °F (36.8 °C) (Oral)   Resp 14   Ht 5' 4\" (1.626 m)   Wt 161 lb 9.6 oz (73.3 kg)   SpO2 95%   BMI 27.74 kg/m²    > BP Readings from Last 3 Encounters:  07/30/21 : 130/86  07/23/20 : 114/76  12/31/19 : 1 Power,tone,co-ordination normalInvoluntary movements and wasting none. Reflexes: normal.   Sensory: all sensory modalities normal.   LYMPHATICS:   Cervical: none. Groin: no adenopathy . Inguinal: no adenopathy. Supraclavicular: none.    DERMATOLOGY:

## 2021-08-06 ENCOUNTER — LAB ENCOUNTER (OUTPATIENT)
Dept: LAB | Age: 86
End: 2021-08-06
Attending: INTERNAL MEDICINE
Payer: MEDICARE

## 2021-08-06 DIAGNOSIS — Z00.00 ROUTINE GENERAL MEDICAL EXAMINATION AT A HEALTH CARE FACILITY: ICD-10-CM

## 2021-08-06 DIAGNOSIS — E03.9 HYPOTHYROIDISM (ACQUIRED): ICD-10-CM

## 2021-08-06 DIAGNOSIS — E78.00 HYPERCHOLESTEREMIA: Chronic | ICD-10-CM

## 2021-08-06 LAB
ALBUMIN SERPL-MCNC: 3 G/DL (ref 3.4–5)
ALBUMIN/GLOB SERPL: 0.9 {RATIO} (ref 1–2)
ALP LIVER SERPL-CCNC: 86 U/L
ALT SERPL-CCNC: 20 U/L
ANION GAP SERPL CALC-SCNC: 3 MMOL/L (ref 0–18)
AST SERPL-CCNC: 20 U/L (ref 15–37)
BASOPHILS # BLD AUTO: 0.05 X10(3) UL (ref 0–0.2)
BASOPHILS NFR BLD AUTO: 0.8 %
BILIRUB SERPL-MCNC: 1 MG/DL (ref 0.1–2)
BUN BLD-MCNC: 18 MG/DL (ref 7–18)
CALCIUM BLD-MCNC: 8.5 MG/DL (ref 8.5–10.1)
CHLORIDE SERPL-SCNC: 108 MMOL/L (ref 98–112)
CHOLEST SMN-MCNC: 194 MG/DL (ref ?–200)
CO2 SERPL-SCNC: 29 MMOL/L (ref 21–32)
CREAT BLD-MCNC: 0.94 MG/DL
EOSINOPHIL # BLD AUTO: 0 X10(3) UL (ref 0–0.7)
EOSINOPHIL NFR BLD AUTO: 0 %
ERYTHROCYTE [DISTWIDTH] IN BLOOD BY AUTOMATED COUNT: 12.4 %
EST. AVERAGE GLUCOSE BLD GHB EST-MCNC: 108 MG/DL (ref 68–126)
GLOBULIN PLAS-MCNC: 3.5 G/DL (ref 2.8–4.4)
GLUCOSE BLD-MCNC: 112 MG/DL (ref 70–99)
HBA1C MFR BLD HPLC: 5.4 % (ref ?–5.7)
HCT VFR BLD AUTO: 42.4 %
HDLC SERPL-MCNC: 41 MG/DL (ref 40–59)
HGB BLD-MCNC: 13.9 G/DL
IMM GRANULOCYTES # BLD AUTO: 0.06 X10(3) UL (ref 0–1)
IMM GRANULOCYTES NFR BLD: 1 %
LDLC SERPL CALC-MCNC: 132 MG/DL (ref ?–100)
LYMPHOCYTES # BLD AUTO: 0.96 X10(3) UL (ref 1–4)
LYMPHOCYTES NFR BLD AUTO: 16.1 %
M PROTEIN MFR SERPL ELPH: 6.5 G/DL (ref 6.4–8.2)
MCH RBC QN AUTO: 33.1 PG (ref 26–34)
MCHC RBC AUTO-ENTMCNC: 32.8 G/DL (ref 31–37)
MCV RBC AUTO: 101 FL
MONOCYTES # BLD AUTO: 0.43 X10(3) UL (ref 0.1–1)
MONOCYTES NFR BLD AUTO: 7.2 %
NEUTROPHILS # BLD AUTO: 4.45 X10 (3) UL (ref 1.5–7.7)
NEUTROPHILS # BLD AUTO: 4.45 X10(3) UL (ref 1.5–7.7)
NEUTROPHILS NFR BLD AUTO: 74.9 %
NONHDLC SERPL-MCNC: 153 MG/DL (ref ?–130)
OSMOLALITY SERPL CALC.SUM OF ELEC: 293 MOSM/KG (ref 275–295)
PATIENT FASTING Y/N/NP: YES
PATIENT FASTING Y/N/NP: YES
PLATELET # BLD AUTO: 208 10(3)UL (ref 150–450)
POTASSIUM SERPL-SCNC: 3.7 MMOL/L (ref 3.5–5.1)
RBC # BLD AUTO: 4.2 X10(6)UL
SODIUM SERPL-SCNC: 140 MMOL/L (ref 136–145)
T4 FREE SERPL-MCNC: 1.3 NG/DL (ref 0.8–1.7)
TRIGL SERPL-MCNC: 115 MG/DL (ref 30–149)
TSI SER-ACNC: 4.87 MIU/ML (ref 0.36–3.74)
VLDLC SERPL CALC-MCNC: 21 MG/DL (ref 0–30)
WBC # BLD AUTO: 6 X10(3) UL (ref 4–11)

## 2021-08-06 PROCEDURE — 80053 COMPREHEN METABOLIC PANEL: CPT

## 2021-08-06 PROCEDURE — 80061 LIPID PANEL: CPT

## 2021-08-06 PROCEDURE — 84443 ASSAY THYROID STIM HORMONE: CPT

## 2021-08-06 PROCEDURE — 85025 COMPLETE CBC W/AUTO DIFF WBC: CPT

## 2021-08-06 PROCEDURE — 83036 HEMOGLOBIN GLYCOSYLATED A1C: CPT

## 2021-08-06 PROCEDURE — 36415 COLL VENOUS BLD VENIPUNCTURE: CPT

## 2021-08-06 PROCEDURE — 84439 ASSAY OF FREE THYROXINE: CPT

## 2021-08-07 ENCOUNTER — LAB ENCOUNTER (OUTPATIENT)
Dept: LAB | Age: 86
End: 2021-08-07
Attending: INTERNAL MEDICINE
Payer: MEDICARE

## 2021-08-07 DIAGNOSIS — Z00.00 ROUTINE GENERAL MEDICAL EXAMINATION AT A HEALTH CARE FACILITY: ICD-10-CM

## 2021-08-07 LAB
BILIRUB UR QL STRIP.AUTO: NEGATIVE
GLUCOSE UR STRIP.AUTO-MCNC: NEGATIVE MG/DL
KETONES UR STRIP.AUTO-MCNC: NEGATIVE MG/DL
NITRITE UR QL STRIP.AUTO: POSITIVE
PH UR STRIP.AUTO: 8 [PH] (ref 5–8)
PROT UR STRIP.AUTO-MCNC: 30 MG/DL
SP GR UR STRIP.AUTO: 1.01 (ref 1–1.03)
UROBILINOGEN UR STRIP.AUTO-MCNC: 2 MG/DL

## 2021-08-07 PROCEDURE — 81001 URINALYSIS AUTO W/SCOPE: CPT

## 2021-08-10 ENCOUNTER — TELEPHONE (OUTPATIENT)
Dept: INTERNAL MEDICINE CLINIC | Facility: CLINIC | Age: 86
End: 2021-08-10

## 2021-08-10 DIAGNOSIS — E03.9 HYPOTHYROIDISM (ACQUIRED): Primary | ICD-10-CM

## 2021-08-10 RX ORDER — LEVOTHYROXINE SODIUM 0.07 MG/1
75 TABLET ORAL
Qty: 90 TABLET | Refills: 0 | Status: SHIPPED | OUTPATIENT
Start: 2021-08-10

## 2021-08-10 NOTE — TELEPHONE ENCOUNTER
----- Message from Ian Fernando MD sent at 8/7/2021  3:27 PM CDT -----  Reviewed results   Increase the levothyroxine to 75 MCG daily. Repeat T4 and TSH in 3 months.   Cholesterol-LDL is marginally elevated would not recommend any medicines taking his ag

## 2021-09-29 ENCOUNTER — APPOINTMENT (OUTPATIENT)
Dept: CT IMAGING | Facility: HOSPITAL | Age: 86
DRG: 482 | End: 2021-09-29
Attending: EMERGENCY MEDICINE
Payer: MEDICARE

## 2021-09-29 ENCOUNTER — APPOINTMENT (OUTPATIENT)
Dept: GENERAL RADIOLOGY | Facility: HOSPITAL | Age: 86
DRG: 482 | End: 2021-09-29
Attending: EMERGENCY MEDICINE
Payer: MEDICARE

## 2021-09-29 ENCOUNTER — ANESTHESIA EVENT (OUTPATIENT)
Dept: SURGERY | Facility: HOSPITAL | Age: 86
DRG: 482 | End: 2021-09-29
Payer: MEDICARE

## 2021-09-29 ENCOUNTER — APPOINTMENT (OUTPATIENT)
Dept: GENERAL RADIOLOGY | Facility: HOSPITAL | Age: 86
DRG: 482 | End: 2021-09-29
Attending: ORTHOPAEDIC SURGERY
Payer: MEDICARE

## 2021-09-29 ENCOUNTER — ANESTHESIA (OUTPATIENT)
Dept: SURGERY | Facility: HOSPITAL | Age: 86
DRG: 482 | End: 2021-09-29
Payer: MEDICARE

## 2021-09-29 ENCOUNTER — HOSPITAL ENCOUNTER (INPATIENT)
Facility: HOSPITAL | Age: 86
LOS: 2 days | Discharge: NURSING, REHAB OR LONG-TERM CARE | DRG: 482 | End: 2021-10-01
Attending: EMERGENCY MEDICINE | Admitting: INTERNAL MEDICINE
Payer: MEDICARE

## 2021-09-29 DIAGNOSIS — S72.009A CLOSED FRACTURE OF HIP, UNSPECIFIED LATERALITY, INITIAL ENCOUNTER (HCC): Primary | ICD-10-CM

## 2021-09-29 PROBLEM — R73.9 HYPERGLYCEMIA: Status: ACTIVE | Noted: 2021-09-29

## 2021-09-29 PROBLEM — R79.89 AZOTEMIA: Status: ACTIVE | Noted: 2021-09-29

## 2021-09-29 PROBLEM — E87.6 HYPOKALEMIA: Status: ACTIVE | Noted: 2021-09-29

## 2021-09-29 PROCEDURE — 0QH706Z INSERTION OF INTRAMEDULLARY INTERNAL FIXATION DEVICE INTO LEFT UPPER FEMUR, OPEN APPROACH: ICD-10-PCS | Performed by: ORTHOPAEDIC SURGERY

## 2021-09-29 PROCEDURE — 76376 3D RENDER W/INTRP POSTPROCES: CPT | Performed by: EMERGENCY MEDICINE

## 2021-09-29 PROCEDURE — 73552 X-RAY EXAM OF FEMUR 2/>: CPT | Performed by: ORTHOPAEDIC SURGERY

## 2021-09-29 PROCEDURE — 73502 X-RAY EXAM HIP UNI 2-3 VIEWS: CPT | Performed by: EMERGENCY MEDICINE

## 2021-09-29 PROCEDURE — 70450 CT HEAD/BRAIN W/O DYE: CPT | Performed by: EMERGENCY MEDICINE

## 2021-09-29 PROCEDURE — 73700 CT LOWER EXTREMITY W/O DYE: CPT | Performed by: EMERGENCY MEDICINE

## 2021-09-29 PROCEDURE — 99222 1ST HOSP IP/OBS MODERATE 55: CPT | Performed by: STUDENT IN AN ORGANIZED HEALTH CARE EDUCATION/TRAINING PROGRAM

## 2021-09-29 PROCEDURE — 73560 X-RAY EXAM OF KNEE 1 OR 2: CPT | Performed by: EMERGENCY MEDICINE

## 2021-09-29 PROCEDURE — 73552 X-RAY EXAM OF FEMUR 2/>: CPT | Performed by: EMERGENCY MEDICINE

## 2021-09-29 PROCEDURE — 71045 X-RAY EXAM CHEST 1 VIEW: CPT | Performed by: EMERGENCY MEDICINE

## 2021-09-29 PROCEDURE — 76000 FLUOROSCOPY <1 HR PHYS/QHP: CPT | Performed by: ORTHOPAEDIC SURGERY

## 2021-09-29 DEVICE — INTERTAN LAG/COMPRESSION SCREW KIT                                    95MM / 90MM
Type: IMPLANTABLE DEVICE | Site: HIP | Status: FUNCTIONAL
Brand: TRIGEN

## 2021-09-29 DEVICE — TRIGEN LOW PROFILE SCREW 5.0MM X 42.5MM
Type: IMPLANTABLE DEVICE | Site: HIP | Status: FUNCTIONAL
Brand: TRIGEN

## 2021-09-29 DEVICE — TRIGEN INTERTAN 1.5 11.5MM X 38CM                                    130DEGREE LEFT
Type: IMPLANTABLE DEVICE | Site: HIP | Status: FUNCTIONAL
Brand: TRIGEN

## 2021-09-29 RX ORDER — BUPIVACAINE HYDROCHLORIDE AND EPINEPHRINE 5; 5 MG/ML; UG/ML
INJECTION, SOLUTION EPIDURAL; INTRACAUDAL; PERINEURAL AS NEEDED
Status: DISCONTINUED | OUTPATIENT
Start: 2021-09-29 | End: 2021-09-29 | Stop reason: HOSPADM

## 2021-09-29 RX ORDER — LEVOTHYROXINE SODIUM 0.07 MG/1
75 TABLET ORAL
Status: DISCONTINUED | OUTPATIENT
Start: 2021-09-29 | End: 2021-10-01

## 2021-09-29 RX ORDER — ENOXAPARIN SODIUM 100 MG/ML
40 INJECTION SUBCUTANEOUS DAILY
Status: DISCONTINUED | OUTPATIENT
Start: 2021-09-29 | End: 2021-09-29

## 2021-09-29 RX ORDER — SODIUM PHOSPHATE, DIBASIC AND SODIUM PHOSPHATE, MONOBASIC 7; 19 G/133ML; G/133ML
1 ENEMA RECTAL ONCE AS NEEDED
Status: DISCONTINUED | OUTPATIENT
Start: 2021-09-29 | End: 2021-10-01

## 2021-09-29 RX ORDER — MIDAZOLAM HYDROCHLORIDE 1 MG/ML
1 INJECTION INTRAMUSCULAR; INTRAVENOUS EVERY 5 MIN PRN
Status: DISCONTINUED | OUTPATIENT
Start: 2021-09-29 | End: 2021-09-29 | Stop reason: HOSPADM

## 2021-09-29 RX ORDER — MORPHINE SULFATE 2 MG/ML
1 INJECTION, SOLUTION INTRAMUSCULAR; INTRAVENOUS EVERY 2 HOUR PRN
Status: DISCONTINUED | OUTPATIENT
Start: 2021-09-29 | End: 2021-10-01

## 2021-09-29 RX ORDER — MORPHINE SULFATE 2 MG/ML
2 INJECTION, SOLUTION INTRAMUSCULAR; INTRAVENOUS EVERY 2 HOUR PRN
Status: DISCONTINUED | OUTPATIENT
Start: 2021-09-29 | End: 2021-09-30

## 2021-09-29 RX ORDER — MORPHINE SULFATE 2 MG/ML
0.5 INJECTION, SOLUTION INTRAMUSCULAR; INTRAVENOUS EVERY 2 HOUR PRN
Status: DISCONTINUED | OUTPATIENT
Start: 2021-09-29 | End: 2021-10-01

## 2021-09-29 RX ORDER — CEFAZOLIN SODIUM 1 G/3ML
INJECTION, POWDER, FOR SOLUTION INTRAMUSCULAR; INTRAVENOUS AS NEEDED
Status: DISCONTINUED | OUTPATIENT
Start: 2021-09-29 | End: 2021-09-29 | Stop reason: SURG

## 2021-09-29 RX ORDER — SODIUM CHLORIDE 9 MG/ML
INJECTION, SOLUTION INTRAVENOUS CONTINUOUS
Status: DISCONTINUED | OUTPATIENT
Start: 2021-09-29 | End: 2021-09-30

## 2021-09-29 RX ORDER — POLYETHYLENE GLYCOL 3350 17 G/17G
17 POWDER, FOR SOLUTION ORAL DAILY PRN
Status: DISCONTINUED | OUTPATIENT
Start: 2021-09-29 | End: 2021-10-01

## 2021-09-29 RX ORDER — MIDAZOLAM HYDROCHLORIDE 1 MG/ML
INJECTION INTRAMUSCULAR; INTRAVENOUS AS NEEDED
Status: DISCONTINUED | OUTPATIENT
Start: 2021-09-29 | End: 2021-09-29 | Stop reason: SURG

## 2021-09-29 RX ORDER — DIPHENHYDRAMINE HYDROCHLORIDE 50 MG/ML
12.5 INJECTION INTRAMUSCULAR; INTRAVENOUS AS NEEDED
Status: DISCONTINUED | OUTPATIENT
Start: 2021-09-29 | End: 2021-09-29 | Stop reason: HOSPADM

## 2021-09-29 RX ORDER — DOXEPIN HYDROCHLORIDE 50 MG/1
1 CAPSULE ORAL DAILY
Status: DISCONTINUED | OUTPATIENT
Start: 2021-09-30 | End: 2021-10-01

## 2021-09-29 RX ORDER — NALOXONE HYDROCHLORIDE 0.4 MG/ML
80 INJECTION, SOLUTION INTRAMUSCULAR; INTRAVENOUS; SUBCUTANEOUS AS NEEDED
Status: DISCONTINUED | OUTPATIENT
Start: 2021-09-29 | End: 2021-09-29 | Stop reason: HOSPADM

## 2021-09-29 RX ORDER — ONDANSETRON 2 MG/ML
4 INJECTION INTRAMUSCULAR; INTRAVENOUS AS NEEDED
Status: DISCONTINUED | OUTPATIENT
Start: 2021-09-29 | End: 2021-09-29 | Stop reason: HOSPADM

## 2021-09-29 RX ORDER — BISACODYL 10 MG
10 SUPPOSITORY, RECTAL RECTAL
Status: DISCONTINUED | OUTPATIENT
Start: 2021-09-29 | End: 2021-10-01

## 2021-09-29 RX ORDER — ACETAMINOPHEN 500 MG
1000 TABLET ORAL ONCE AS NEEDED
Status: DISCONTINUED | OUTPATIENT
Start: 2021-09-29 | End: 2021-09-29 | Stop reason: HOSPADM

## 2021-09-29 RX ORDER — CEFAZOLIN SODIUM/WATER 2 G/20 ML
2 SYRINGE (ML) INTRAVENOUS EVERY 8 HOURS
Status: COMPLETED | OUTPATIENT
Start: 2021-09-30 | End: 2021-09-30

## 2021-09-29 RX ORDER — TRANEXAMIC ACID 10 MG/ML
1000 INJECTION, SOLUTION INTRAVENOUS ONCE
Status: DISCONTINUED | OUTPATIENT
Start: 2021-09-29 | End: 2021-09-29 | Stop reason: HOSPADM

## 2021-09-29 RX ORDER — ENOXAPARIN SODIUM 100 MG/ML
40 INJECTION SUBCUTANEOUS DAILY
Status: DISCONTINUED | OUTPATIENT
Start: 2021-09-30 | End: 2021-10-01

## 2021-09-29 RX ORDER — ONDANSETRON 2 MG/ML
4 INJECTION INTRAMUSCULAR; INTRAVENOUS EVERY 6 HOURS PRN
Status: DISCONTINUED | OUTPATIENT
Start: 2021-09-29 | End: 2021-10-01

## 2021-09-29 RX ORDER — HYDROMORPHONE HYDROCHLORIDE 1 MG/ML
0.4 INJECTION, SOLUTION INTRAMUSCULAR; INTRAVENOUS; SUBCUTANEOUS EVERY 5 MIN PRN
Status: DISCONTINUED | OUTPATIENT
Start: 2021-09-29 | End: 2021-09-29 | Stop reason: HOSPADM

## 2021-09-29 RX ORDER — MEPERIDINE HYDROCHLORIDE 25 MG/ML
12.5 INJECTION INTRAMUSCULAR; INTRAVENOUS; SUBCUTANEOUS AS NEEDED
Status: DISCONTINUED | OUTPATIENT
Start: 2021-09-29 | End: 2021-09-29 | Stop reason: HOSPADM

## 2021-09-29 RX ORDER — TRANEXAMIC ACID 10 MG/ML
1000 INJECTION, SOLUTION INTRAVENOUS ONCE
Status: COMPLETED | OUTPATIENT
Start: 2021-09-29 | End: 2021-09-29

## 2021-09-29 RX ORDER — METOCLOPRAMIDE HYDROCHLORIDE 5 MG/ML
10 INJECTION INTRAMUSCULAR; INTRAVENOUS EVERY 8 HOURS PRN
Status: DISCONTINUED | OUTPATIENT
Start: 2021-09-29 | End: 2021-10-01

## 2021-09-29 RX ORDER — SODIUM CHLORIDE, SODIUM LACTATE, POTASSIUM CHLORIDE, CALCIUM CHLORIDE 600; 310; 30; 20 MG/100ML; MG/100ML; MG/100ML; MG/100ML
INJECTION, SOLUTION INTRAVENOUS CONTINUOUS
Status: DISCONTINUED | OUTPATIENT
Start: 2021-09-29 | End: 2021-09-29 | Stop reason: HOSPADM

## 2021-09-29 RX ORDER — ACETAMINOPHEN 325 MG/1
650 TABLET ORAL EVERY 6 HOURS PRN
Status: DISCONTINUED | OUTPATIENT
Start: 2021-09-29 | End: 2021-10-01

## 2021-09-29 RX ORDER — MORPHINE SULFATE 2 MG/ML
0.5 INJECTION, SOLUTION INTRAMUSCULAR; INTRAVENOUS ONCE
Status: DISCONTINUED | OUTPATIENT
Start: 2021-09-29 | End: 2021-10-01

## 2021-09-29 RX ADMIN — SODIUM CHLORIDE: 9 INJECTION, SOLUTION INTRAVENOUS at 19:54:00

## 2021-09-29 RX ADMIN — TRANEXAMIC ACID 1000 MG: 10 INJECTION, SOLUTION INTRAVENOUS at 18:20:00

## 2021-09-29 RX ADMIN — CEFAZOLIN SODIUM 2 G: 1 INJECTION, POWDER, FOR SOLUTION INTRAMUSCULAR; INTRAVENOUS at 18:24:00

## 2021-09-29 RX ADMIN — SODIUM CHLORIDE: 9 INJECTION, SOLUTION INTRAVENOUS at 18:14:00

## 2021-09-29 RX ADMIN — MIDAZOLAM HYDROCHLORIDE 2 MG: 1 INJECTION INTRAMUSCULAR; INTRAVENOUS at 18:14:00

## 2021-09-29 NOTE — ANESTHESIA PREPROCEDURE EVALUATION
PRE-OP EVALUATION    Patient Name: Mary Giang    Admit Diagnosis: Closed fracture of hip, unspecified laterality, initial encounter ([facility] Marie Cottrell    Pre-op Diagnosis: Displaced intertroch fx left femur, init for opn fx type I/2 ([facility] [S82.148K Product (PROBIOTIC OR), Take 1 tablet by mouth daily. , Disp: , Rfl: , 9/28/2021 at 0900  Levothyroxine Sodium 75 MCG Oral Tab, Take 1 tablet (75 mcg total) by mouth before breakfast., Disp: 90 tablet, Rfl: 0, 9/28/2021 at 0800  Cyanocobalamin (VITAMIN B-12 134 (H) 09/29/2021    CA 9.3 09/29/2021             Anesthesia Plan      Present on Admission:  • Hypokalemia  • Hyperglycemia  • Azotemia

## 2021-09-29 NOTE — ANESTHESIA PREPROCEDURE EVALUATION
PRE-OP EVALUATION    Patient Name: Dino Layton    Admit Diagnosis: Closed fracture of hip, unspecified laterality, initial encounter ([facility] Eusebio Bryson    Pre-op Diagnosis: Displaced intertroch fx left femur, init for opn fx type I/2 ([facility] [I41.978Q Product (PROBIOTIC OR), Take 1 tablet by mouth daily. , Disp: , Rfl: , 9/28/2021 at 0900  Levothyroxine Sodium 75 MCG Oral Tab, Take 1 tablet (75 mcg total) by mouth before breakfast., Disp: 90 tablet, Rfl: 0, 9/28/2021 at 0800  Cyanocobalamin (VITAMIN B-12  (H) 09/29/2021    CA 9.3 09/29/2021            Airway      Mallampati: II  Mouth opening: >3 FB  TM distance: > 6 cm  Neck ROM: full Cardiovascular    Cardiovascular exam normal.         Dental    No notable dental history.          Pulmonary

## 2021-09-29 NOTE — PROGRESS NOTES
NURSING ADMISSION NOTE      Patient admitted via stretcher  Oriented to room. Safety precautions initiated. Bed in low position. Call light in reach.    Wife at bedside

## 2021-09-29 NOTE — PLAN OF CARE
Full skin assessment done with two nurses at the bedside. Scratch to right posterior leg noted, no other issues. Alert and oriented x4. [place], no hearing aids. SCD's. Glasses in patient belonging bag. Last BM 09/28. Patient self cath's at home.  ER reported t

## 2021-09-29 NOTE — CONSULTS
[facility]    Report of Consultation    Dai Clarke Patient Status:  Inpatient    1926 MRN [id number]   [facility] SURGERY Attending Valentina Ortiz DO   Hosp Day # 0 PCP Alfie Zambrano MD     Date of Admission:  20 REPORT      y v plasty       Family History  Family History   Problem Relation Age of Onset   • Cancer Mother         breast cancer   • Other (Other[other]) Father         cerebral hemmorhage       Social History  Social History    Tobacco Use      Smoking total) by mouth before breakfast.  Cyanocobalamin (VITAMIN B-12 OR), Take 1 tablet by mouth daily. Ascorbic Acid (VITAMIN C) 1000 MG Oral Tab, Take 1,000 mg by mouth daily. Vitamin D3 (VITAMIN D3) 1000 UNITS Oral Tab, Take 1,000 Units by mouth daily. intact in all nerve distributions  Motor function: 5/5 for HF, KE, DF, PF, EHL        Results:     Laboratory Data:  Lab Results   Component Value Date    WBC 7.9 09/29/2021    HGB 14.2 09/29/2021    HCT 43.1 09/29/2021    .0 09/29/2021    ERA CT BRAIN OR HEAD (85537)    Result Date: 9/29/2021  CONCLUSION:  1. No evidence of an acute intracranial process. 2. Stable mild to moderate diffuse atrophy and white matter disease consistent with chronic small vessel ischemic changes.    Dictated by ( treatment including the complications of prolonged immobility. We reviewed the risks and benefits of operative management. Discussed the importance of early mobility. The patient and his wife have elected to proceed with surgical treatment.   Patient i

## 2021-09-29 NOTE — H&P
[facility]  History and Physical     Markusmichaelle Murphy Patient Status:  Emergency    1926 MRN [id number]   Location [facility] Attending Junaid Cabrera MD   Hosp Day # 0 PCP Suze Goddard MD     Chief Complain facility-administered medications on file prior to encounter.   Levothyroxine Sodium 75 MCG Oral Tab, Take 1 tablet (75 mcg total) by mouth before breakfast., Disp: 90 tablet, Rfl: 0  LEVOTHYROXINE SODIUM 50 MCG Oral Tab, TAKE 1 TABLET BY MOUTH IN THE [facility] input(s): PTP, INR in the last 168 hours. COVID-19 Lab Results    COVID-19  Lab Results   Component Value Date    COVID19 Not Detected 09/29/2021       Pro-Calcitonin  No results for input(s): PCT in the last 168 hours.     Cardiac  No results for input(

## 2021-09-29 NOTE — ED INITIAL ASSESSMENT (HPI)
Wife states she was putting on pt's pajamas, lost balance and began to fall, lowered to floor. Pain to lt hip  Leg shortened and externally rotates. Palp pedal pulse.

## 2021-09-29 NOTE — ED PROVIDER NOTES
Patient Seen in: [facility] Emergency Department      History   Patient presents with:  Fall  Leg or Foot Injury    Stated Complaint: FALL leg pain    Subjective:   HPI    24-year-old man here with right hip pain after fall.   Patient's wife states t HPI.  Constitutional and vital signs reviewed. All other systems reviewed and negative except as noted above.     Physical Exam     ED Triage Vitals   BP 09/29/21 0922 125/63   Pulse 09/29/21 0922 66   Resp 09/29/21 0922 18   Temp 09/29/21 0922 98.5 °F - Normal   CBC WITH DIFFERENTIAL WITH PLATELET    Narrative: The following orders were created for panel order CBC With Differential With Platelet.   Procedure                               Abnormality         Status                     --------- present             CONCLUSION:    Some overlying artifact from clothing, sheet, blanket or other fabric material.  Soft tissue swelling anteromedial aspect of the may reflect contusion in the setting of recent falling.   No displacement, fracture, deformit 9/29/2021  PROCEDURE:  CT HIP(BONE) LEFT (CPT=73700)  COMPARISON:  [facility] , CT, CT ABD/PLV(S) KIDNEYSTONE W/3D, 6/18/2012, 2:57 PM.  [facility] , XR, XR FEMUR MIN 2 VIEWS LEFT (CPT=73552), 9/29/2021, 10:43 AM.  INDICATIONS:  FALL leg pain  TECHNIQUE:  Multi-pl PORTABLE  (CPT=71045)    Result Date: 9/29/2021  PROCEDURE:  XR CHEST AP PORTABLE  (CPT=71045)  TECHNIQUE:  AP chest radiograph was obtained.   COMPARISON:  [facility], XR CHEST PA + LAT CHEST (CPT=71046), 7/30/2021, 2:58 PM.  INDICATIONS:  FALL leg pa CONCLUSION:    Acute fracture left femur. Pelvic calcification suspicious for stone within the lumen of the urinary bladder or possibly within the bladder diverticulum on the left.    Dictated by (CST): Marychuy Olsen MD on 9/29/2021 at 11:20 AM     F

## 2021-09-30 PROCEDURE — 99232 SBSQ HOSP IP/OBS MODERATE 35: CPT | Performed by: STUDENT IN AN ORGANIZED HEALTH CARE EDUCATION/TRAINING PROGRAM

## 2021-09-30 RX ORDER — SENNOSIDES 8.6 MG
8.6 TABLET ORAL 2 TIMES DAILY
Status: DISCONTINUED | OUTPATIENT
Start: 2021-09-30 | End: 2021-10-01

## 2021-09-30 NOTE — OCCUPATIONAL THERAPY NOTE
Attempted to see patient this am for OT eval, however patient lethargic and having difficulty maintaining alertness long enough to actively participate in evaluation. Will re-attempt as schedule permits.

## 2021-09-30 NOTE — OPERATIVE REPORT
Operative Note  [facility]    Patient Name: Romana Mercy    Procedure Date: 2021    : 1926    MRN: [id number]    Preoperative Diagnosis: Closed.  displaced pertrochanteric femur fracture, left    Postoperative Diagnosis: be central and aiming down the canal the femur. The wire was then advanced on power just past the level of the lesser trochanter. The stab incision was widened to about 4 cm, and the deep fascia was incised sharply with a knife.   The entry reamer was this point the jig was removed and final fluoroscopy images were obtained confirming appropriate positioning of all implants and appropriate alignment of the fracture. The wounds were then thoroughly irrigated with normal saline.   The incisions were close

## 2021-09-30 NOTE — PROGRESS NOTES
[facility]     Hospitalist Progress Note     Juliana Galeas Patient Status:  Inpatient    1926 MRN [id number]   [facility] 3SW-A Attending Zac Quinn,    Hosp Day # 1 PCP Sandeep Perez MD     Chief Complaint: Hip fract the last 168 hours. Imaging: Imaging data reviewed in Epic.     Medications:   • cefTRIAXone  1 g Intravenous Q24H   • morphINE sulfate  0.5 mg Intravenous Once   • levothyroxine  75 mcg Oral Before breakfast   • multivitamin  1 tablet Oral Daily   • josiah

## 2021-09-30 NOTE — ANESTHESIA POSTPROCEDURE EVALUATION
[facility] Patient Status:  Inpatient   Age/Gender 80year old male MRN [id number]   Location [address] [facility] ER Attending Carlos Cook DO   Hosp Day # 0 PCP Inge Telles MD       Anesthesia Post-op No

## 2021-09-30 NOTE — PLAN OF CARE
Pt Aox2, pleasantly forgetful. Unable to follow some directions at times. [place] no HA. 1L of O2 via NC. Denies pain to Left hip at this time. Denies numbness or tingling. Aquacel x4 dressing, C/D/I. Bilateral doppler pedal pulses, skin cool and dry to touch.

## 2021-09-30 NOTE — PROGRESS NOTES
Rn paged Dr Kulwinder Jeffers this am for patients lethargy this am. He is able to open eyes and respond to Rn when asked if he was warm or cold, he stated warm. Warm blankets were on him. Family at bedside stated he is drowsy. Wearing 2LNC with 02 sat 97%.  Other armando

## 2021-09-30 NOTE — PROGRESS NOTES
ORTHOPEDIC SURGERY PROGRESS NOTE    Attending: Amaya Carlin MD    Procedure: Left femur IM nail  Date of Procedure: 9/29/2021     SUBJECTIVE:  No events. Pain well controlled today.   Denies fever, chills, chest pain, shortness of breath    OBJECTIVE

## 2021-09-30 NOTE — PHYSICAL THERAPY NOTE
PHYSICAL THERAPY EVALUATION - INPATIENT     Room Number: 373/373-A  Evaluation Date: 9/30/2021  Type of Evaluation: Initial  Physician Order: PT Eval and Treat    Presenting Problem: Displaced intertrochanteric fracture s/p L IM nailing   Reason for Equipment: Rolling walker  Patient Regularly Uses: Glasses    Prior Level of Isabella: Amb with RW assist at household distance. Able to get up and use the restroom on his own.  Transport chair in the community    SUBJECTIVE  I am Ok for him to go to re Score (AM-PAC Scale): 28.58   CMS Modifier (G-Code): CM    FUNCTIONAL ABILITY STATUS  Gait Assessment       Comment : not tested    Skilled Therapy Provided: In bed and is 0x1. Wife at bs.  Max2PA in sup<>sit on EOB with F sitting balance on EOB limited by assist patient in returning to prior to level of function. DISCHARGE RECOMMENDATIONS  PT Discharge Recommendations: Sub-acute rehabilitation    PLAN  PT Treatment Plan: Bed mobility; Body mechanics; Patient education;  Family education; Gait training; Roshni Lim

## 2021-09-30 NOTE — CM/SW NOTE
09/30/21 1100   CM/SW Referral Data   Referral Source Social Work (self-referral)   Reason for Referral Discharge planning   Informant Spouse/Significant Other   Patient Info   Patient's Current Mental Status at Time of Assessment Confused or unable to Abdirahman Mckeon, [facility]  Discharge Planner  [phone]

## 2021-09-30 NOTE — PLAN OF CARE
Plan of care discussed with patient and spouse at bedside this am. He is alert and answering Rn appropriately. Very hard of hearing, no hearing aides. Wears dentures. Left hip aquacell dressings x4 are all clean and dry. Reg Ice packs in place. Scd's on.  P

## 2021-09-30 NOTE — PAYOR COMM NOTE
--------------  ADMISSION REVIEW     PayorLalita Buckley MA [facility]  Subscriber #:  [id number]  Authorization Number: [id number]       ED Provider Notes        History   Patient presents with:  Fall  Leg or Foot Injury    Stated Complaint: FALL leg pain    Subjective: (Temporal)   Resp 16   Wt 72.6 kg   SpO2 94%   BMI 27.46 kg/m²     Physical Exam  Vitals signs and nursing note reviewed. General: Well-appearing elderly gentleman sitting in the bed no acute distress  Head: Normocephalic and atraumatic.    HEENT:  Mucous proximal shaft, proximally extending to the greater trochanter of the femur with mild angulation and moderate displacement of the distal shaft dorsally with respect to the proximal aspect of the femur. No dislocation seen.   No fracture involving the mid o VENTRICLES/SULCI:  Ventricles and sulci are prominent without change. INTRACRANIAL:  No acute intracranial hemorrhage, mass effect or midline shift.   Stable mild to moderate diffuse atrophy and white matter disease consistent with chronic small vessel isch Within the limits of this CT scan there is no evidence of a fracture of the femoral neck. The acetabulum and visualized pelvis are otherwise intact. SOFT TISSUES:  Mild amount of hemorrhage is evident around the fracture.   There is otherwise no abnormalit Technologist)  Patient offered no additional history at this time. FINDINGS:   Acute fracture left proximal femur with an oblique fracture having spiral component involving the proximal shaft extending into the lesser trochanter.   Displacement of the home this morning, fell while his wife was assisting him in putting his pants on. No syncope prior/after event. Pt fell on his left side.      Physical Exam:    BP (!) 135/95   Pulse 86   Temp 98.5 °F (36.9 °C) (Temporal)   Resp 16   Wt 160 lb (72.6 kg)   S reviewed the risks of nonoperative treatment including the complications of prolonged immobility. We reviewed the risks and benefits of operative management.   Discussed the importance of early mobility.     The patient and his wife have elected to proceed Date Action Dose Route User    9/30/2021 1033 Given  Oral Danielle Payment, RN      bupivacaine 0.5%-EPINEPHrine 1:200,000 PF (MARCAINE/EPINEPHRINE) injection     Date Action Dose Route User    9/29/2021 2049 Given  Infiltration (Left Hip) Alber Nim Lowell Yang, RN            Vitals (last day)     Date/Time Temp Pulse Resp BP SpO2 Weight O2 Device O2 Flow Rate (L/min) Who    09/30/21 0700 98.3 °F (36.8 °C) 84 16 149/89 95 % — None (Room air) — BK    09/29/21 2300 97.9 °F (36.6 °C) 90 16 123/

## 2021-09-30 NOTE — BRIEF OP NOTE
Brief Op Note    Pre-Operative Diagnosis: Displaced left proximal pertrochanteric femur fracture     Post-Operative Diagnosis: Displaced left proximal pertrochanteric femur fracture     Procedure Performed:   INTRAMEDUALLARY NAIL OF LEFT PERTROCHANTERIC FE

## 2021-09-30 NOTE — OCCUPATIONAL THERAPY NOTE
OCCUPATIONAL THERAPY EVALUATION - INPATIENT     Room Number: 373/373-A  Evaluation Date: 9/30/2021  Type of Evaluation: Initial  Presenting Problem: fall resulting in left hip closed fracture s/p IM nailing 9/29/21    Physician Order: IP Consult to Milind Grant height toilet  Shower/Tub and Equipment: Walk-in shower  Other Equipment:  (RW)    Occupation/Status: retired  Hand Dominance: Right  Drives: Yes  Patient Regularly Uses: Glasses    Prior Level of Function: supervision w/ functional moiblity in the home wi SATURATIONS       ACTIVITIES OF DAILY LIVING ASSESSMENT  AM-PAC ‘6-Clicks’ Inpatient Daily Activity Short Form  How much help from another person does the patient currently need…  -   Putting on and taking off regular lower body clothing?: Dana  -   Rosey Lai 9/29/2021 for left hip fx s/p IM nailing w/ WBAT orders. Complete medical history and occupational profile noted above. Functional outcome measures completed include:  The AM-REJI ' '6-Clicks' Inpatient Daily Activity Short Form was completed and  this patie education  Rehab Potential : Fair  Frequency (Obs): 3-5x/week  Number of Visits to Meet Established Goals: 7    ADL Goals   Patient will perform eating: with set up  Patient will perform grooming: with setup  Patient will perform lower body dressing:  with

## 2021-10-01 VITALS
HEART RATE: 69 BPM | TEMPERATURE: 99 F | DIASTOLIC BLOOD PRESSURE: 57 MMHG | SYSTOLIC BLOOD PRESSURE: 103 MMHG | OXYGEN SATURATION: 92 % | WEIGHT: 160 LBS | BODY MASS INDEX: 27 KG/M2 | RESPIRATION RATE: 16 BRPM

## 2021-10-01 PROCEDURE — 99239 HOSP IP/OBS DSCHRG MGMT >30: CPT | Performed by: STUDENT IN AN ORGANIZED HEALTH CARE EDUCATION/TRAINING PROGRAM

## 2021-10-01 RX ORDER — ENOXAPARIN SODIUM 100 MG/ML
40 INJECTION SUBCUTANEOUS DAILY
Qty: 42 EACH | Refills: 0 | Status: SHIPPED | OUTPATIENT
Start: 2021-10-02 | End: 2021-10-29

## 2021-10-01 RX ORDER — TRAMADOL HYDROCHLORIDE 50 MG/1
50 TABLET ORAL EVERY 8 HOURS PRN
Status: DISCONTINUED | OUTPATIENT
Start: 2021-10-01 | End: 2021-10-01

## 2021-10-01 RX ORDER — SODIUM CHLORIDE 9 MG/ML
INJECTION, SOLUTION INTRAVENOUS CONTINUOUS
Status: ACTIVE | OUTPATIENT
Start: 2021-10-01 | End: 2021-10-01

## 2021-10-01 RX ORDER — TRAMADOL HYDROCHLORIDE 50 MG/1
50 TABLET ORAL EVERY 8 HOURS PRN
Qty: 12 TABLET | Refills: 0 | Status: SHIPPED | OUTPATIENT
Start: 2021-10-01 | End: 2021-10-29 | Stop reason: CLARIF

## 2021-10-01 NOTE — PLAN OF CARE
Patient resting in room, wife at bedside. Patient on 2L of O2 nasal canula. SCD's. PT/OT recommended sit-stand for transferring from bed to chair. Patient denies pain, numbness or tingling to both lower extremities. Aquacel x4 to L hip C/D/I.  Ice applied P

## 2021-10-01 NOTE — PROGRESS NOTES
ORTHOPEDIC SURGERY PROGRESS NOTE    Attending: Baudilio Wills MD    Procedure: Left femur IM nail  Date of Procedure: 9/29/2021     SUBJECTIVE:  No events. Minimal pain.   Up in chair this AM.  Denies fever, chills, chest pain, shortness of breath    O

## 2021-10-01 NOTE — PROGRESS NOTES
Patient OK to go to [facility]. Called [facility] to give report. Report given to [facility].  Ambulance transport arranged for 6pm.

## 2021-10-01 NOTE — PROGRESS NOTES
Patient discharged at this time. IV removed. Discharge education given to patient and wife, all questions answered. Patient being transferred via ambulance to [facility].

## 2021-10-01 NOTE — PROGRESS NOTES
Rn st cath as ordered tonight, sterile procedure. 250ml out of cloudy, julien, odorous urine. IVF infusing as ordered since patient has poor appetite and not taking in much fluids today.  Report given to oncoming Rn.

## 2021-10-01 NOTE — PLAN OF CARE
Pt straight cathed as per order for neurogenic bladder. Drained 450ml julien colored urine. Insurance auth obtained. Pt OK for dc to [facility] today. Pt spouse verbalized understanding of POC and agrees. Will continue to monitor.

## 2021-10-01 NOTE — PHYSICAL THERAPY NOTE
PHYSICAL THERAPY TREATMENT NOTE - INPATIENT    Room Number: 373/373-A     Session: 1     Number of Visits to Meet Established Goals: 5    Presenting Problem: Displaced intertrochanteric fracture s/p L IM nailing     ASSESSMENT     Pt remains total polly Static Sitting: Good  Dynamic Sitting: Fair           Static Standing: Not tested  Dynamic Standing: Not tested    ACTIVITY TOLERANCE                         O2 WALK         AM-PAC '6-Clicks' INPATIENT SHORT FORM - BASIC MOBILITY  How much difficulty curtis

## 2021-10-01 NOTE — PROGRESS NOTES
[facility]     Hospitalist Progress Note     Alexandra Valero Patient Status:  Inpatient    1926 MRN [id number]   [facility] 3SW-A Attending Candace Baugh, DO   Hosp Day # 2 PCP Evon Shahid MD     Chief Complaint: Hip fract last 168 hours. Imaging: Imaging data reviewed in Epic.     Medications:   • Senna  8.6 mg Oral BID   • morphINE sulfate  0.5 mg Intravenous Once   • levothyroxine  75 mcg Oral Before breakfast   • multivitamin  1 tablet Oral Daily   • enoxaparin  40 mg

## 2021-10-01 NOTE — OCCUPATIONAL THERAPY NOTE
OCCUPATIONAL THERAPY TREATMENT NOTE - INPATIENT     Room Number: 373/373-A  Session: 1   Number of Visits to Meet Established Goals: 7    Presenting Problem: fall resulting in left hip closed fracture s/p IM nailing 9/29/21    ASSESSMENT     Patient is a 9 seated rest  Chair transfer: NT  Toilet/commode transfer: NT  Ambulation: NT    Activity tolerance: Fatigues quickly, vitals wfl    Education provided: Role of OT, Safety with ADL and transfers, Overview of identified deficits, Activity recommendations, se Endurance training; Patient/Family education; Patient/Family training; Cognitive reorientation; Equipment eval/education; Neuromuscluar reeducation;  Compensatory technique education  Rehab Potential : Fair  Frequency (Obs): 3-5x/week    OT Goals: Goals dominic

## 2021-10-01 NOTE — PLAN OF CARE
Alert and oriented x 1-2,very [place],no hearing aid. Spouse at bedside,assisting with  care. Buttock red,skin intact,blanches easily,mepilex applied. Aquacel x 4 to left hip dry and intact,ice pack as needed. Neurogenic bladder,straight cath q 6 hours. Denies need

## 2021-10-01 NOTE — CM/SW NOTE
Met with pt's wife at bedside and provided [facility] list of accepting [facility] facilities. Pt's wife would like him to go to the facility with the most open visiting policy.   Contacted accepting facilities and then met with pt's wife again to update her to [facility]

## 2021-10-01 NOTE — CM/SW NOTE
Spoke with Haritha from [facility] who confirmed they have received insurance auth and can accept pt for admission today. Updated pt's RN who confirmed medical clearance for DC.   Ambulance transport arranged for 6pm.  PCS form completed and available for RN to prin

## 2021-10-02 ENCOUNTER — EXTERNAL FACILITY (OUTPATIENT)
Dept: FAMILY MEDICINE CLINIC | Facility: CLINIC | Age: 86
End: 2021-10-02

## 2021-10-02 DIAGNOSIS — G30.9 AD (ALZHEIMER'S DISEASE) (HCC): ICD-10-CM

## 2021-10-02 DIAGNOSIS — N31.9 NEUROGENIC BLADDER: ICD-10-CM

## 2021-10-02 DIAGNOSIS — F02.80 AD (ALZHEIMER'S DISEASE) (HCC): ICD-10-CM

## 2021-10-02 DIAGNOSIS — E03.9 HYPOTHYROIDISM, UNSPECIFIED TYPE: ICD-10-CM

## 2021-10-02 DIAGNOSIS — S72.002A CLOSED FRACTURE OF LEFT HIP, INITIAL ENCOUNTER (HCC): ICD-10-CM

## 2021-10-02 DIAGNOSIS — Z47.89 ORTHOPEDIC AFTERCARE: Primary | ICD-10-CM

## 2021-10-02 PROCEDURE — 1111F DSCHRG MED/CURRENT MED MERGE: CPT | Performed by: FAMILY MEDICINE

## 2021-10-02 PROCEDURE — 99306 1ST NF CARE HIGH MDM 50: CPT | Performed by: FAMILY MEDICINE

## 2021-10-04 ENCOUNTER — INITIAL APN SNF VISIT (OUTPATIENT)
Dept: INTERNAL MEDICINE CLINIC | Age: 86
End: 2021-10-04

## 2021-10-04 VITALS
OXYGEN SATURATION: 95 % | DIASTOLIC BLOOD PRESSURE: 81 MMHG | TEMPERATURE: 98 F | RESPIRATION RATE: 20 BRPM | SYSTOLIC BLOOD PRESSURE: 116 MMHG | HEART RATE: 62 BPM

## 2021-10-04 DIAGNOSIS — G47.00 INSOMNIA, UNSPECIFIED TYPE: ICD-10-CM

## 2021-10-04 DIAGNOSIS — N31.9 NEUROGENIC BLADDER: ICD-10-CM

## 2021-10-04 DIAGNOSIS — E03.9 HYPOTHYROIDISM (ACQUIRED): Primary | ICD-10-CM

## 2021-10-04 DIAGNOSIS — F02.80 AD (ALZHEIMER'S DISEASE) (HCC): ICD-10-CM

## 2021-10-04 DIAGNOSIS — Z47.1 AFTERCARE FOLLOWING LEFT HIP JOINT REPLACEMENT SURGERY: ICD-10-CM
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
## 2021-10-04 DIAGNOSIS — Z96.642 AFTERCARE FOLLOWING LEFT HIP JOINT REPLACEMENT SURGERY: ICD-10-CM

## 2021-10-04 DIAGNOSIS — G30.9 AD (ALZHEIMER'S DISEASE) (HCC): ICD-10-CM

## 2021-10-04 DIAGNOSIS — S72.002D CLOSED FRACTURE OF LEFT HIP WITH ROUTINE HEALING, SUBSEQUENT ENCOUNTER: ICD-10-CM

## 2021-10-04 PROCEDURE — 99310 SBSQ NF CARE HIGH MDM 45: CPT | Performed by: NURSE PRACTITIONER

## 2021-10-04 PROCEDURE — 3079F DIAST BP 80-89 MM HG: CPT | Performed by: NURSE PRACTITIONER

## 2021-10-04 PROCEDURE — 3074F SYST BP LT 130 MM HG: CPT | Performed by: NURSE PRACTITIONER

## 2021-10-04 RX ORDER — ACETAMINOPHEN 325 MG/1
650 TABLET ORAL EVERY 6 HOURS PRN
COMMUNITY
End: 2021-12-13

## 2021-10-04 RX ORDER — POLYETHYLENE GLYCOL 3350 17 G/17G
17 POWDER, FOR SOLUTION ORAL DAILY PRN
COMMUNITY
End: 2021-10-29 | Stop reason: CLARIF

## 2021-10-04 RX ORDER — DOCUSATE SODIUM 100 MG/1
100 CAPSULE, LIQUID FILLED ORAL 2 TIMES DAILY
COMMUNITY
End: 2021-10-29 | Stop reason: CLARIF

## 2021-10-04 NOTE — PROGRESS NOTES
Dai Vince  : 1926  Age 80year old  male patient is admitted to Facility: [facility].     [address] [place] [address] Drive date:  21  Discharge date to [facility]:  10/1/21  ELOS:  18-21 d 11/3/2015   • HYPOTHYROIDISM    • Memory loss 1/19/2016   • Mixed hyperlipidemia    • Neurogenic bladder 11/3/2015   • Numbness in both legs 1/19/2016   • Osteoarthrosis, localized, primary, knee 12/15/2011   • Primary osteoarthritis of both knees 10/24/20 Probiotic Product (PROBIOTIC OR) Take 1 tablet by mouth daily. • Levothyroxine Sodium 75 MCG Oral Tab Take 1 tablet (75 mcg total) by mouth before breakfast. 90 tablet 0   • Cyanocobalamin (VITAMIN B-12 OR) Take 1 tablet by mouth daily.      • Ascorbic rebound tenderness. :Deferred and Self Caths; RN staff also straight cathing  LYMPHATIC:no lymphedema  MUSCULOSKELETAL: no acute synovitis upper or lower extremity.   Weakness R/T recent hospitalization/diagnoses/sequelae; will undergo therapies to rehab [  ]  Arrhythmia                               [  ]  Dialysis      Hospital score:     Attribute:  [ ]Points if positive:    Low hemoglobin at discharge (<12g/dl)                                [1]  Followed by Oncology service to bring hearing aids    Bowel Regimen  -Add Colace 100 mg bid  -Add Miralax 17 gm every day prn  -Sennosides/Docusate 8.6-50 mg bid  -Add Dulcolax Supp 10 mg pr every day prn    Supplements  -Vitamin B12 1000 mcq every day  -Vitamin C 500 mg every day  -P

## 2021-10-09 ENCOUNTER — EXTERNAL FACILITY (OUTPATIENT)
Dept: FAMILY MEDICINE CLINIC | Facility: CLINIC | Age: 86
End: 2021-10-09

## 2021-10-09 DIAGNOSIS — G30.9 AD (ALZHEIMER'S DISEASE) (HCC): ICD-10-CM

## 2021-10-09 DIAGNOSIS — N31.9 NEUROGENIC BLADDER: ICD-10-CM

## 2021-10-09 DIAGNOSIS — S72.002A CLOSED FRACTURE OF LEFT HIP, INITIAL ENCOUNTER (HCC): ICD-10-CM

## 2021-10-09 DIAGNOSIS — E03.9 HYPOTHYROIDISM, UNSPECIFIED TYPE: ICD-10-CM

## 2021-10-09 DIAGNOSIS — Z47.89 ORTHOPEDIC AFTERCARE: Primary | ICD-10-CM

## 2021-10-09 DIAGNOSIS — F02.80 AD (ALZHEIMER'S DISEASE) (HCC): ICD-10-CM

## 2021-10-09 PROCEDURE — 99309 SBSQ NF CARE MODERATE MDM 30: CPT | Performed by: FAMILY MEDICINE

## 2021-10-12 ENCOUNTER — SNF VISIT (OUTPATIENT)
Dept: INTERNAL MEDICINE CLINIC | Age: 86
End: 2021-10-12

## 2021-10-12 VITALS
OXYGEN SATURATION: 97 % | HEART RATE: 82 BPM | TEMPERATURE: 98 F | DIASTOLIC BLOOD PRESSURE: 70 MMHG | RESPIRATION RATE: 18 BRPM | SYSTOLIC BLOOD PRESSURE: 120 MMHG

## 2021-10-12 DIAGNOSIS — G30.9 AD (ALZHEIMER'S DISEASE) (HCC): ICD-10-CM

## 2021-10-12 DIAGNOSIS — Z47.1 AFTERCARE FOLLOWING LEFT HIP JOINT REPLACEMENT SURGERY: Primary | ICD-10-CM

## 2021-10-12 DIAGNOSIS — Z96.642 AFTERCARE FOLLOWING LEFT HIP JOINT REPLACEMENT SURGERY: Primary | ICD-10-CM

## 2021-10-12 DIAGNOSIS — S72.002D CLOSED FRACTURE OF LEFT HIP WITH ROUTINE HEALING, SUBSEQUENT ENCOUNTER: ICD-10-CM
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
## 2021-10-12 DIAGNOSIS — F02.80 AD (ALZHEIMER'S DISEASE) (HCC): ICD-10-CM

## 2021-10-12 PROCEDURE — 99309 SBSQ NF CARE MODERATE MDM 30: CPT | Performed by: NURSE PRACTITIONER

## 2021-10-12 PROCEDURE — 3078F DIAST BP <80 MM HG: CPT | Performed by: NURSE PRACTITIONER

## 2021-10-12 PROCEDURE — 3074F SYST BP LT 130 MM HG: CPT | Performed by: NURSE PRACTITIONER

## 2021-10-13 NOTE — PROGRESS NOTES
Oneida Otoole Author: Joaquín Hernandez MD     1926 MRN [id number]   [facility]  Admission 21      Last Hospital Discharge 10/1/21 PCP Liliya Nichole MD   [facility]  [facility]         CC--follow-up    H. P.I Inez Gonzalez breast cancer   • Other (Other[other]) Father         cerebral hemmorhage     Social History    Tobacco Use      Smoking status: Never Smoker      Smokeless tobacco: Never Used      Tobacco comment: quit many years ago    Vaping Use      Vaping Use for the neurogenic bladder  Check his labs tomorrow  Constipation protocol      Kelly Aggarwal MD   [address] [place] [address]    Electronically signed

## 2021-10-13 NOTE — PROGRESS NOTES
Libby Kenyon, 4/22/1926, 80year old, male    Chief Complaint:  Patient presents with:   Follow - Up: Fall, s/p left hip surgery       Subjective:  79 y/o male with PMH sig for Dementia, hearing impairment-hearing aids, GERD, urinary retention-strai (staples) c/d/i.  No drainage noted  EYES: PERRLA, EOMI, sclera anicteric, conjunctiva normal; there is no nystagmus, no drainage from eyes  HENT: normocephalic; normal nose, no nasal drainage, mucous membranes pink, moist, pharynx no exudate, no visible ce d/t Urine Retention  -Self Caths     Low 02 level  -Apply 02 2 liters for 02 level <90%  -RT to eval and treat  -Incentive Spirometry qid with ten reps     Dementia/Memory Loss  -No treatment     Vitamin D.  Deficiency  -Cholecalciferol 1000 units every day

## 2021-10-13 NOTE — DISCHARGE SUMMARY
[facility] [place] HOSPITALIST  DISCHARGE SUMMARY     Oneida Otoole Patient Status:  Inpatient    1926 MRN [id number]   [facility] 3SW-A Attending No att. providers found   Hosp Day # 2 PCP Liliya Nichole MD     Date of Admission: 2021 (75 mcg total) by mouth before breakfast.   Quantity: 90 tablet  Refills: 0     PROBIOTIC OR      Take 1 tablet by mouth daily. Refills: 0     VITAMIN B-12 OR      Take 1 tablet by mouth daily.    Refills: 0     vitamin C 1000 MG Tabs      Take 1,000 mg b [address] side in front of the [address] building. Wheelchairs are available inside the building. &nbsp; There is a walkway up to the building that can be difficult for those who have trouble walking distances. &nbsp; Elevator to [address].

## 2021-10-14 ENCOUNTER — SNF VISIT (OUTPATIENT)
Dept: INTERNAL MEDICINE CLINIC | Age: 86
End: 2021-10-14

## 2021-10-14 DIAGNOSIS — F02.80 AD (ALZHEIMER'S DISEASE) (HCC): ICD-10-CM

## 2021-10-14 DIAGNOSIS — Z96.642 AFTERCARE FOLLOWING LEFT HIP JOINT REPLACEMENT SURGERY: Primary | ICD-10-CM
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
## 2021-10-14 DIAGNOSIS — G30.9 AD (ALZHEIMER'S DISEASE) (HCC): ICD-10-CM

## 2021-10-14 DIAGNOSIS — S72.002D CLOSED FRACTURE OF LEFT HIP WITH ROUTINE HEALING, SUBSEQUENT ENCOUNTER: ICD-10-CM

## 2021-10-14 DIAGNOSIS — Z47.1 AFTERCARE FOLLOWING LEFT HIP JOINT REPLACEMENT SURGERY: Primary | ICD-10-CM

## 2021-10-14 PROCEDURE — 3074F SYST BP LT 130 MM HG: CPT | Performed by: NURSE PRACTITIONER

## 2021-10-14 PROCEDURE — 99307 SBSQ NF CARE SF MDM 10: CPT | Performed by: NURSE PRACTITIONER

## 2021-10-14 PROCEDURE — 3078F DIAST BP <80 MM HG: CPT | Performed by: NURSE PRACTITIONER

## 2021-10-15 VITALS
SYSTOLIC BLOOD PRESSURE: 122 MMHG | HEART RATE: 82 BPM | RESPIRATION RATE: 18 BRPM | DIASTOLIC BLOOD PRESSURE: 68 MMHG | TEMPERATURE: 98 F | OXYGEN SATURATION: 94 %

## 2021-10-15 NOTE — PROGRESS NOTES
Dino Layton, 4/22/1926, 80year old, male    Chief Complaint:  Patient presents with:   Follow - Up: S/p Left Hip Surgery       Subjective:  79 y/o male with PMH sig for Dementia, hearing impairment-hearing aids, GERD, urinary retention-straight ca pharynx no exudate, no visible cerumen;+hearing aids present  NECK: supple; FROM; no JVD, no TMG, no carotid bruits  BREAST: ---deferred  RESPIRATORY:---Diminished bilaterally  CARDIOVASCULAR: S1, S2 normal, RRR; no S3, no S4; , no click, no murmur  ABDOME day     Labs  -CBC and CMP due on 10/19     Followup  Appointments  -Chen Dunne, PCP in 7 to 10 days after discharge from [place] [address]  -Dr. Naa Ruiz, Orthopedic Surgeon as directed     Danica Aguirre, APRN  10/14/2021  5 p.m.

## 2021-10-16 ENCOUNTER — APPOINTMENT (OUTPATIENT)
Dept: CV DIAGNOSTICS | Facility: HOSPITAL | Age: 86
End: 2021-10-16
Attending: HOSPITALIST
Payer: MEDICARE

## 2021-10-16 ENCOUNTER — APPOINTMENT (OUTPATIENT)
Dept: CT IMAGING | Facility: HOSPITAL | Age: 86
End: 2021-10-16
Attending: EMERGENCY MEDICINE
Payer: MEDICARE

## 2021-10-16 ENCOUNTER — HOSPITAL ENCOUNTER (OUTPATIENT)
Facility: HOSPITAL | Age: 86
Setting detail: OBSERVATION
Discharge: NURSING, REHAB OR LONG-TERM CARE | End: 2021-10-19
Attending: EMERGENCY MEDICINE | Admitting: HOSPITALIST
Payer: MEDICARE

## 2021-10-16 ENCOUNTER — APPOINTMENT (OUTPATIENT)
Dept: GENERAL RADIOLOGY | Facility: HOSPITAL | Age: 86
End: 2021-10-16
Attending: EMERGENCY MEDICINE
Payer: MEDICARE

## 2021-10-16 DIAGNOSIS — R07.9 ACUTE CHEST PAIN: Primary | ICD-10-CM

## 2021-10-16 DIAGNOSIS — N31.9 NEUROGENIC BLADDER: ICD-10-CM

## 2021-10-16 DIAGNOSIS — N20.0 KIDNEY STONE: ICD-10-CM

## 2021-10-16 PROCEDURE — 99220 INITIAL OBSERVATION CARE,LEVL III: CPT | Performed by: HOSPITALIST

## 2021-10-16 PROCEDURE — 71045 X-RAY EXAM CHEST 1 VIEW: CPT | Performed by: EMERGENCY MEDICINE

## 2021-10-16 PROCEDURE — 93306 TTE W/DOPPLER COMPLETE: CPT | Performed by: HOSPITALIST

## 2021-10-16 PROCEDURE — 71275 CT ANGIOGRAPHY CHEST: CPT | Performed by: EMERGENCY MEDICINE

## 2021-10-16 RX ORDER — ONDANSETRON 2 MG/ML
8 INJECTION INTRAMUSCULAR; INTRAVENOUS EVERY 6 HOURS PRN
Status: DISCONTINUED | OUTPATIENT
Start: 2021-10-16 | End: 2021-10-19

## 2021-10-16 RX ORDER — BISACODYL 10 MG
10 SUPPOSITORY, RECTAL RECTAL
Status: DISCONTINUED | OUTPATIENT
Start: 2021-10-16 | End: 2021-10-19

## 2021-10-16 RX ORDER — ASPIRIN 81 MG/1
324 TABLET, CHEWABLE ORAL ONCE
Status: DISCONTINUED | OUTPATIENT
Start: 2021-10-16 | End: 2021-10-19

## 2021-10-16 RX ORDER — LEVOTHYROXINE SODIUM 0.07 MG/1
75 TABLET ORAL
Status: DISCONTINUED | OUTPATIENT
Start: 2021-10-16 | End: 2021-10-19

## 2021-10-16 RX ORDER — TRAMADOL HYDROCHLORIDE 50 MG/1
50 TABLET ORAL EVERY 12 HOURS PRN
Status: DISCONTINUED | OUTPATIENT
Start: 2021-10-16 | End: 2021-10-19

## 2021-10-16 RX ORDER — ENOXAPARIN SODIUM 100 MG/ML
40 INJECTION SUBCUTANEOUS DAILY
Status: DISCONTINUED | OUTPATIENT
Start: 2021-10-16 | End: 2021-10-19

## 2021-10-16 RX ORDER — POLYETHYLENE GLYCOL 3350 17 G/17G
17 POWDER, FOR SOLUTION ORAL DAILY PRN
Status: DISCONTINUED | OUTPATIENT
Start: 2021-10-16 | End: 2021-10-19

## 2021-10-16 RX ORDER — POTASSIUM CHLORIDE 20 MEQ/1
40 TABLET, EXTENDED RELEASE ORAL EVERY 4 HOURS
Status: COMPLETED | OUTPATIENT
Start: 2021-10-16 | End: 2021-10-16

## 2021-10-16 RX ORDER — ACETAMINOPHEN 325 MG/1
650 TABLET ORAL EVERY 6 HOURS PRN
Status: DISCONTINUED | OUTPATIENT
Start: 2021-10-16 | End: 2021-10-19

## 2021-10-16 RX ORDER — MELATONIN
3 NIGHTLY PRN
Status: DISCONTINUED | OUTPATIENT
Start: 2021-10-16 | End: 2021-10-19

## 2021-10-16 NOTE — H&P
[facility] HOSPITALIST  History and Physical     Merlin Fleck Patient Status:  Observation    1926 MRN [id number]   [facility] 2NE-A Attending Ancelmo Alicea MD   Hosp Day # 0 PCP Rohith Mi MD     Chief Complaint: ches tpain facility-administered medications on file prior to encounter.   traZODone 50 MG Oral Tab, Take 50 mg by mouth nightly., Disp: , Rfl:   acetaminophen 325 MG Oral Tab, Take 650 mg by mouth every 6 (six) hours as needed for Pain., Disp: , Rfl:   docusate sodiu Soft, nontender, nondistended. Positive bowel sounds. No rebound, guarding or organomegaly. Neurologic: No focal neurological deficits. CNII-XII grossly intact. Musculoskeletal: Moves all extremities. Extremities: No edema or cyanosis.   Integument: No midnight's based on the clinical documentation in H+P. Based on patients current state of illness, I anticipate that, after discharge, patient will require TBD.

## 2021-10-16 NOTE — PLAN OF CARE
NURSING ADMISSION NOTE      Patient admitted via Cart  Oriented to room. Safety precautions initiated. Bed in low position. Call light in reach. Pt admitted via ER for chest pain. From [place]. Jac Blackburn 41. Admission assessment complete.  Adm fluid balance, assess for edema, trend weights  Outcome: Progressing  Goal: Absence of cardiac arrhythmias or at baseline  Description: INTERVENTIONS:  - Continuous cardiac monitoring, monitor vital signs, obtain 12 lead EKG if indicated  - Evaluate effect degrees preferred)  - Offer food and liquids at a slow rate  - No straws  - Encourage small bites of food and small sips of liquid  - Offer pills one at a time, crush or deliver with applesauce as needed  - Discontinue feeding and notify MD (or speech path

## 2021-10-16 NOTE — SLP NOTE
ADULT SWALLOWING EVALUATION    ASSESSMENT    ASSESSMENT/OVERALL IMPRESSION:  Pt seen this afternoon for BSSE to r/o aspiration. Nurse approved this evaluation.  No family was present.     Pt is a 79 y/o M who was admitted to [facility] today with chest 1:1 assistance during meals, following compensatory strategies/aspiration precautions outlined below.     * SLP to f/u with pt for meal monitors to ensure toleration of recommended diet and pt/family education re: compensatory strategies/aspiration precauti lingual  Tone: Within Functional Limits  Range of Motion: Reduced right lingual;Reduced left lingual  Rate of Motion: Reduced    Voice Quality: Clear  Respiratory Status: Unlabored  Consistencies Trialed:  Thin liquids;Puree;Hard solid  Method of Presentati

## 2021-10-16 NOTE — ED PROVIDER NOTES
Patient Seen in: [facility] Emergency Department      History   Patient presents with:  Chest Pain    Stated Complaint:     Subjective:   HPI    35-year-old male complaining of chest pain this patient has a history of dementia had a recent fall and use: No             Review of Systems    Positive for stated complaint:   Other systems are as noted in HPI. Constitutional and vital signs reviewed. All other systems reviewed and negative except as noted above.     Physical Exam     ED Triage Vitals TROPONIN I - Normal   TROPONIN I - Normal   RAPID SARS-COV-2 BY PCR - Normal   CBC WITH DIFFERENTIAL WITH PLATELET    Narrative: The following orders were created for panel order CBC With Differential With Platelet.   Procedure artifact from clothing, sheet, blanket or other fabric material.  Soft tissue swelling anteromedial aspect of the may reflect contusion in the setting of recent falling. No displacement, fracture, deformity. No dislocation  seen.   Arterial calcifications Bruna Velasco MD on 10/16/2021 at 6:35 AM     Finalized by (CST): Bruna Velasco MD on 10/16/2021 at 6:37 AM       XR CHEST AP PORTABLE  (CPT=71045)    Result Date: 9/29/2021  CONCLUSION:    Continued blunting left lateral costophrenic angle.   Chr Date Reviewed: 10/13/2021          ICD-10-CM Noted POA    * (Principal) Acute chest pain R07.9 10/16/2021 Unknown

## 2021-10-16 NOTE — ED QUICK NOTES
Orders for admission, patient is aware of plan and ready to go upstairs. Any questions, please call ED ELZBIETA Jackson  at extension [phone].      Vaccinated? no  Type of COVID test sent: Rapid  COVID Suspicion level: Low/High: LOW      Titratable drug(s) infusing

## 2021-10-17 PROCEDURE — 99225 SUBSEQUENT OBSERVATION CARE: CPT | Performed by: HOSPITALIST

## 2021-10-17 RX ORDER — FOLIC ACID 1 MG/1
1 TABLET ORAL DAILY
Status: DISCONTINUED | OUTPATIENT
Start: 2021-10-17 | End: 2021-10-19

## 2021-10-17 NOTE — PLAN OF CARE
Received pt at 0730. Alert to self. Sinus arrhythmia with PACs and PVCs on tele. VSS. Denies chest pain and SOB. WNL on room air. Up x2 with walker. Recent R femur fracture in September. Last BM 10/16. Pt incontinent.  Pt unable to urinate and straight cath lead EKG if indicated  - Evaluate effectiveness of antiarrhythmic and heart rate control medications as ordered  - Initiate emergency measures for life threatening arrhythmias  - Monitor electrolytes and administer replacement therapy as ordered  Outcome: feeding and notify MD (or speech pathologist) if coughing or persistent throat clearing or wet/gurgly vocal quality is noted  Outcome: Progressing

## 2021-10-17 NOTE — PHYSICAL THERAPY NOTE
PHYSICAL THERAPY EVALUATION - INPATIENT     Room Number: 2604/2604-A  Evaluation Date: 10/17/2021  Type of Evaluation: Initial  Physician Order: PT Eval and Treat    Presenting Problem: chest pain  Reason for Therapy: Mobility Dysfunction and Dischar and stiffness in LLE    Patient self-stated goal is return home, walk better and get stronger    OBJECTIVE  Precautions: Limb alert - left  Fall Risk: Standard fall risk    WEIGHT BEARING RESTRICTION  Weight Bearing Restriction: L lower extremity decreased speed 1ft/sec, step to pattern with LLE leading; requires occasional standing breaks, vcs to maintain distance with RW towards end of session  Toilet transfer min A  toileting total A  Standing sink activities CGA  Chair transfers: min A with vcs to/from Chair/Wheelchair at assistance level: supervision     Goal #3 Patient is able to ambulate 150 feet with assist device: walker - rolling at assistance level: supervision     Goal #4    Goal #5    Goal #6    Goal Comments: Goals established on 10/17/

## 2021-10-17 NOTE — PROGRESS NOTES
[facility] HOSPITALIST  Progress note     Newton Telles Patient Status:  Observation    1926 MRN [id number]   [facility] 2NE-A Attending Marcus Bonilla MD   Hosp Day # 0 PCP Familia Loyola MD     Chief Complaint: Valeria Reilly 10/16/21  0733   TROP <0.045 <0.045       Creatinine Kinase  No results for input(s): CK in the last 168 hours. Inflammatory Markers  Recent Labs   Lab 10/16/21  0549   DDIMER 3.39*       Imaging: Imaging data reviewed in Epic.       ASSESSMENT / PLAN:

## 2021-10-17 NOTE — PLAN OF CARE
Tele monitoring. Straight catheter tid. Wife at bedside. Pt alert times one name only. Physical therapy to evaluate and treat. Bed alarm on for saferty. Iv rocephin for positive uti.    Results for [facility] (MRN [id number]) as of 10/17/2021 07:4 uL 0.05   Neutrophils % Latest Units: % 73.6   Lymphocytes % Latest Units: % 15.0   Monocytes % Latest Units: % 9.5   Eosinophils % Latest Units: % 0.0   Basophils % Latest Units: % 0.8   Immature Granulocyte % Latest Units: % 1.1     Problem: Patient/Fami ability to void and empty bladder  - Monitor intake/output and perform bladder scan as needed  - Follow urinary retention protocol/standard of care  - Consider collaborating with pharmacy to review patient's medication profile  - Implement strategies to pr

## 2021-10-18 PROCEDURE — 99225 SUBSEQUENT OBSERVATION CARE: CPT | Performed by: HOSPITALIST

## 2021-10-18 RX ORDER — ASPIRIN 81 MG/1
81 TABLET ORAL DAILY
Qty: 30 TABLET | Refills: 3 | Status: SHIPPED | OUTPATIENT
Start: 2021-10-18 | End: 2021-10-29 | Stop reason: CLARIF

## 2021-10-18 RX ORDER — CEPHALEXIN 500 MG/1
500 CAPSULE ORAL 3 TIMES DAILY
Qty: 12 CAPSULE | Refills: 0 | Status: SHIPPED | OUTPATIENT
Start: 2021-10-18 | End: 2021-10-22

## 2021-10-18 RX ORDER — LIDOCAINE HYDROCHLORIDE 20 MG/ML
10 JELLY TOPICAL AS NEEDED
Status: DISCONTINUED | OUTPATIENT
Start: 2021-10-18 | End: 2021-10-19

## 2021-10-18 RX ORDER — METOPROLOL SUCCINATE 25 MG/1
25 TABLET, EXTENDED RELEASE ORAL DAILY
Qty: 30 TABLET | Refills: 3 | Status: SHIPPED | OUTPATIENT
Start: 2021-10-18 | End: 2021-12-13

## 2021-10-18 NOTE — CM/SW NOTE
Pt is a short term resident at [facility], prior auth needed to admit back. [facility] called and cancelled to cancel transport at 4pm. [facility] has started authorization.

## 2021-10-18 NOTE — PLAN OF CARE
Patient drowsy, oriented to self. Wife at bedside. NSR/sinus arrhythmia on tele. VSS. Patient denies chest pain or dyspnea. O2 sat >90% on RA. Plan for cardiology to see in a.m., possible discharge today. Patient and patient's wife updated on plan of care. administer replacement therapy as ordered  Outcome: Progressing     Problem: GENITOURINARY - ADULT  Goal: Absence of urinary retention  Description: INTERVENTIONS:  - Assess patient’s ability to void and empty bladder  - Monitor intake/output and perform b

## 2021-10-18 NOTE — CM/SW NOTE
Pt is a LTC resident at [facility]. Updates sent through [facility]. Pt will dc back to [facility] once medically cleared. RN/SW to update [facility] once cleared.

## 2021-10-18 NOTE — CM/SW NOTE
Pt is cleared for dc today. Pt will return to [facility] via ambulance at 4pm. MMN made aware. PCS form complete. RN to call [phone] for report.

## 2021-10-18 NOTE — OCCUPATIONAL THERAPY NOTE
OCCUPATIONAL THERAPY EVALUATION - INPATIENT     Room Number: 2604/2604-A  Evaluation Date: 10/18/2021  Type of Evaluation: Initial  Presenting Problem: chest pain     Physician Order: IP Consult to Occupational Therapy  Reason for Therapy: ADL/IADL Dysfunc complete handwashing routine; pt required frequent rest breaks while standing seen by leaning on counter in between steps of handwashing. UB dressing:  Pt adjusted gown as needed over shoulders with min assist while seated on toilet.  Anticipated min assi SUBJECTIVE   \"I feel better today\".     PAIN ASSESSMENT  Ratin  Location: denies pain       OBJECTIVE  Precautions: Limb alert - left  Fall Risk: High fall risk    COGNITION  Overall Cognitive Status:  Impaired    ASSESSMENTS    Upper Extremity: goggles, and gloves worn. Patient/family PPE: Mask worn by family; mask not worn by pt.

## 2021-10-18 NOTE — DISCHARGE SUMMARY
[facility] HOSPITALIST  DISCHARGE SUMMARY     Dai Clarke Patient Status:  Observation    1926 MRN [id number]   [facility] 2NE-A Attending Pino Nathan MD   Hosp Day # 0 PCP Leora Mendez MD     Date of Admission: 10/16/2021  Da Tb24  Commonly known as: Toprol XL      Take 1 tablet (25 mg total) by mouth daily.    Quantity: 30 tablet  Refills: 3        CONTINUE taking these medications      Instructions Prescription details   acetaminophen 325 MG Tabs  Commonly known as: TYLENOL directed by your doctor or nurse    Bring a paper prescription for each of these medications  · cephalexin 500 MG Caps         [place]    Follow-up appointment:   Vangie Gutierrez, [address] [place] [address]

## 2021-10-18 NOTE — CONSULTS
[place]  Consultation    [facility] Patient Status:  Observation    1926 MRN [id number]   [facility] 2NE-A Attending Negro Mendoza MD   Hosp Day # 0 PCP Angelo Bowen MD     Consults    Reason for Consultation:  Savita Bryson current alcohol use. He reports that he does not use drugs.     Allergies:    Tetanus Toxoid          RASH, SWELLING    Medications:    Current Facility-Administered Medications:   •  lidocaine (Urojet) 2 % urethral jelly 10 mL, 10 mL, Urethral, PRN  •  fol Cardiovascular: Normal rate and regular rhythm. Exam reveals no gallop and no friction rub. No murmur heard. Pulmonary/Chest: Effort normal and breath sounds normal.   Abdominal: Soft.  Bowel sounds are normal.   Neurological: He is alert and oriente

## 2021-10-18 NOTE — CONSULTS
[facility]  [facility] Urology  Consultation Note    Juliana Galeas Patient Status:  Observation    1926 MRN [id number]   [facility] 2[facility] Attending Lisandro Schultz MD   Hosp Day # 0 PCP Luis Enrique Bella MD     Reason for Consultation alcohol use. He reports that he does not use drugs.     Allergies:    Tetanus Toxoid          RASH, SWELLING    Medications:    Current Facility-Administered Medications:   •  lidocaine (Urojet) 2 % urethral jelly 10 mL, 10 mL, Urethral, PRN  •  folic acid 10/16/21  0541 10/16/21  2047 10/17/21  0613   *  --  101*   BUN 19*  --  18   CREATSERUM 0.72  --  0.74   GFRAA 92  --  91   GFRNAA 79  --  78   CA 9.0  --  9.0     --  138   K 3.5 4.6 4.3     --  109   CO2 28.0  --  25.0       UA 10/16 Impression:  Patient Active Problem List:     Hypothyroidism     Neurogenic bladder     Hypercholesteremia     AD (Alzheimer's disease) ([facility] [address]     Neuropathy     Elevated PSA, less than 10 ng/ml     Hypokalemia     Hyperglycemia     Azotemia     Gaye

## 2021-10-19 ENCOUNTER — EXTERNAL FACILITY (OUTPATIENT)
Dept: FAMILY MEDICINE CLINIC | Facility: CLINIC | Age: 86
End: 2021-10-19

## 2021-10-19 VITALS
HEART RATE: 84 BPM | WEIGHT: 151 LBS | TEMPERATURE: 98 F | RESPIRATION RATE: 20 BRPM | SYSTOLIC BLOOD PRESSURE: 162 MMHG | BODY MASS INDEX: 26 KG/M2 | DIASTOLIC BLOOD PRESSURE: 92 MMHG | OXYGEN SATURATION: 95 %

## 2021-10-19 DIAGNOSIS — Z47.89 ORTHOPEDIC AFTERCARE: ICD-10-CM

## 2021-10-19 DIAGNOSIS — R07.9 ACUTE CHEST PAIN: Primary | ICD-10-CM

## 2021-10-19 DIAGNOSIS — N31.9 NEUROGENIC BLADDER: ICD-10-CM

## 2021-10-19 DIAGNOSIS — N39.0 URINARY TRACT INFECTION WITHOUT HEMATURIA, SITE UNSPECIFIED: ICD-10-CM

## 2021-10-19 DIAGNOSIS — E03.9 HYPOTHYROIDISM, UNSPECIFIED TYPE: ICD-10-CM

## 2021-10-19 DIAGNOSIS — F02.80 AD (ALZHEIMER'S DISEASE) (HCC): ICD-10-CM

## 2021-10-19 DIAGNOSIS — G30.9 AD (ALZHEIMER'S DISEASE) (HCC): ICD-10-CM

## 2021-10-19 PROCEDURE — 99305 1ST NF CARE MODERATE MDM 35: CPT | Performed by: FAMILY MEDICINE

## 2021-10-19 PROCEDURE — 99217 OBSERVATION CARE DISCHARGE: CPT | Performed by: HOSPITALIST

## 2021-10-19 PROCEDURE — 3080F DIAST BP >= 90 MM HG: CPT | Performed by: HOSPITALIST

## 2021-10-19 PROCEDURE — 3077F SYST BP >= 140 MM HG: CPT | Performed by: HOSPITALIST

## 2021-10-19 RX ORDER — ASPIRIN 81 MG/1
81 TABLET ORAL DAILY
Status: DISCONTINUED | OUTPATIENT
Start: 2021-10-19 | End: 2021-10-19

## 2021-10-19 RX ORDER — METOPROLOL SUCCINATE 25 MG/1
25 TABLET, EXTENDED RELEASE ORAL
Status: DISCONTINUED | OUTPATIENT
Start: 2021-10-19 | End: 2021-10-19

## 2021-10-19 NOTE — PROGRESS NOTES
For transfer to [facility]. Yeager in place. Marquis Loges receiving RN to remove tomorrow and resume in and out catheter as before. Family on bedside. DC tele. DC HL.

## 2021-10-19 NOTE — PROGRESS NOTES
Progress Note  [facility] Patient Status:  Observation    1926 MRN [id number]   [facility] 2NE-A Attending Sarah Vanegas MD   Hosp Day # 0 PCP Kvng Cody MD     Subjective: In bed on exam. No complaints for me.  Awaitin • cefTRIAXone  1 g Intravenous Q24H             Assessment:  • Chest pain, troponin negative x2  o No acute changes on Ekg  o Echo with normal EF and no sig valve changes  • UTI-abx per pcp    Plan:  • Cont asa, toprol 25mg po daily.    • bp elevated this

## 2021-10-19 NOTE — CM/SW NOTE
10/19/21 1020   Discharge disposition   Expected discharge disposition [id number] Provider [facility] Na   Discharge transportation Tisha Choudhury has auth for return today. SW met with pt and wife.  Wife requests Me

## 2021-10-19 NOTE — PROGRESS NOTES
[facility] HOSPITALIST  Progress note     Marlene Lowe Patient Status:  Observation    1926 MRN [id number]   [facility] 2NE-A Attending Josh Lugo MD   Hosp Day # 0 PCP Adam Raines MD     Chief Complaint: [facility] [place] hours. Cardiac  Recent Labs   Lab 10/16/21  0541 10/16/21  0733   TROP <0.045 <0.045       Creatinine Kinase  No results for input(s): CK in the last 168 hours.     Inflammatory Markers  Recent Labs   Lab 10/16/21  0549 10/17/21  0613   ARSH  --  424.4

## 2021-10-19 NOTE — PLAN OF CARE
Problem: Patient/Family Goals  Goal: Patient/Family Long Term Goal  Description: Patient's Long Term Goal: 'go home'    Interventions:  - monitor vitals  - monitor chest pain  - see cardiology  - 2D echo  - See additional Care Plan goals for specific int strategies to promote bladder emptying  Outcome: Progressing     Problem: MUSCULOSKELETAL - ADULT  Goal: Return mobility to safest level of function  Description: INTERVENTIONS:  - Assess patient stability and activity tolerance for standing, transferring

## 2021-10-20 NOTE — PROGRESS NOTES
Eduardo Desir Author: Bhavani John MD     1926 MRN [id number]   [facility]  Admission 10/16/21      Last Hospital Discharge 10/19/21 PCP Ze Avelar [facility] of Discharge  [facility]        CC --r readmitted to [facility] and [facility]   • SPECIAL SERVICE OR REPORT      y v plasty     Family History   Problem Relation Age of Onset   • Cancer Mother         breast cancer   • Other (Other[other]) Father         cerebral hemmorhage     Social History    Tobacco Use      Smokin (VITAMIN D3) 1000 UNITS Oral Tab Take 1,000 Units by mouth daily. REVIEW OF SYSTEMS:  Review of Systems:   Constitutional: No fevers, chills, fatigue or night sweats. ENT: No mouth pain, neck pain, running nose, headaches or swollen glands.   Skin: supervision for multiple medical comorbidities, fall risk, DVT risk, infection risk, pain management  - Psych for adjustment to disability and cognitive deficits  - Social work/: for discharge planning needs and access to community resources as

## 2021-10-21 ENCOUNTER — SNF VISIT (OUTPATIENT)
Dept: INTERNAL MEDICINE CLINIC | Age: 86
End: 2021-10-21

## 2021-10-21 VITALS
TEMPERATURE: 98 F | HEART RATE: 79 BPM | RESPIRATION RATE: 20 BRPM | SYSTOLIC BLOOD PRESSURE: 152 MMHG | DIASTOLIC BLOOD PRESSURE: 75 MMHG | OXYGEN SATURATION: 95 %

## 2021-10-21 DIAGNOSIS — S72.002D CLOSED FRACTURE OF LEFT HIP WITH ROUTINE HEALING, SUBSEQUENT ENCOUNTER: ICD-10-CM

## 2021-10-21 DIAGNOSIS — N31.9 NEUROGENIC BLADDER: ICD-10-CM
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
## 2021-10-21 DIAGNOSIS — Z97.8 FOLEY CATHETER STATUS: ICD-10-CM

## 2021-10-21 DIAGNOSIS — N30.00 ACUTE CYSTITIS WITHOUT HEMATURIA: ICD-10-CM

## 2021-10-21 DIAGNOSIS — G30.9 AD (ALZHEIMER'S DISEASE) (HCC): ICD-10-CM

## 2021-10-21 DIAGNOSIS — F02.80 AD (ALZHEIMER'S DISEASE) (HCC): ICD-10-CM

## 2021-10-21 DIAGNOSIS — F02.80 LATE ONSET ALZHEIMER'S DISEASE WITHOUT BEHAVIORAL DISTURBANCE (HCC): ICD-10-CM

## 2021-10-21 DIAGNOSIS — Z47.1 AFTERCARE FOLLOWING LEFT HIP JOINT REPLACEMENT SURGERY: Primary | ICD-10-CM

## 2021-10-21 DIAGNOSIS — Z96.642 AFTERCARE FOLLOWING LEFT HIP JOINT REPLACEMENT SURGERY: Primary | ICD-10-CM

## 2021-10-21 DIAGNOSIS — G30.1 LATE ONSET ALZHEIMER'S DISEASE WITHOUT BEHAVIORAL DISTURBANCE (HCC): ICD-10-CM

## 2021-10-21 PROCEDURE — 3077F SYST BP >= 140 MM HG: CPT | Performed by: NURSE PRACTITIONER

## 2021-10-21 PROCEDURE — 3078F DIAST BP <80 MM HG: CPT | Performed by: NURSE PRACTITIONER

## 2021-10-21 PROCEDURE — 99309 SBSQ NF CARE MODERATE MDM 30: CPT | Performed by: NURSE PRACTITIONER

## 2021-10-22 ENCOUNTER — SNF DISCHARGE (OUTPATIENT)
Dept: INTERNAL MEDICINE CLINIC | Age: 86
End: 2021-10-22

## 2021-10-22 DIAGNOSIS — G30.9 AD (ALZHEIMER'S DISEASE) (HCC): ICD-10-CM

## 2021-10-22 DIAGNOSIS — S72.002D CLOSED FRACTURE OF LEFT HIP WITH ROUTINE HEALING, SUBSEQUENT ENCOUNTER: ICD-10-CM

## 2021-10-22 DIAGNOSIS — Z47.1 AFTERCARE FOLLOWING LEFT HIP JOINT REPLACEMENT SURGERY: Primary | ICD-10-CM

## 2021-10-22 DIAGNOSIS — F02.80 AD (ALZHEIMER'S DISEASE) (HCC): ICD-10-CM

## 2021-10-22 DIAGNOSIS — Z96.642 AFTERCARE FOLLOWING LEFT HIP JOINT REPLACEMENT SURGERY: Primary | ICD-10-CM

## 2021-10-22 DIAGNOSIS — G30.1 LATE ONSET ALZHEIMER'S DISEASE WITHOUT BEHAVIORAL DISTURBANCE (HCC): ICD-10-CM

## 2021-10-22 DIAGNOSIS — N30.00 ACUTE CYSTITIS WITHOUT HEMATURIA: ICD-10-CM
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
## 2021-10-22 DIAGNOSIS — N31.9 NEUROGENIC BLADDER: ICD-10-CM

## 2021-10-22 DIAGNOSIS — F02.80 LATE ONSET ALZHEIMER'S DISEASE WITHOUT BEHAVIORAL DISTURBANCE (HCC): ICD-10-CM

## 2021-10-22 PROCEDURE — 3077F SYST BP >= 140 MM HG: CPT | Performed by: NURSE PRACTITIONER

## 2021-10-22 PROCEDURE — 3078F DIAST BP <80 MM HG: CPT | Performed by: NURSE PRACTITIONER

## 2021-10-22 PROCEDURE — 99316 NF DSCHRG MGMT 30 MIN+: CPT | Performed by: NURSE PRACTITIONER

## 2021-10-22 NOTE — PROGRESS NOTES
Sirena Krishnamurthy, 4/22/1926, 80year old, male    Chief Complaint:  Patient presents with:   Follow - Up: S/p fall, s/p left hip surgery    Subjective: 27-year-old male with recent hospitalization for left hip surgery and receiving subacute rehab at [facility] normocephalic; normal nose, no nasal drainage, mucous membranes pink, moist, pharynx no exudate, no visible cerumen;+hearing aids present  NECK: supple; FROM; no JVD, no TMG, no carotid bruits  BREAST: ---deferred  RESPIRATORY:---Diminished bilaterally  CA intact. .  Degenerative changes are evident. No findings of other acute fracture, dislocation, or significant other bone or joint abnormality seen. .  Impressions:  . IMPRESSION: No findings of acute traumatic injury are seen.   .  Interpreting Radiology discharge from [place] [address]  -Dr. Jeffery Kunz, Orthopedic Surgeon as directed      Beny Jhaveri, APRN  10/21/2021  7:05 PM

## 2021-10-23 VITALS
RESPIRATION RATE: 18 BRPM | OXYGEN SATURATION: 95 % | SYSTOLIC BLOOD PRESSURE: 140 MMHG | DIASTOLIC BLOOD PRESSURE: 76 MMHG | HEART RATE: 68 BPM

## 2021-10-23 NOTE — PROGRESS NOTES
Mary Giang, 4/22/1926, 80year old, male is being discharged from Facility: [facility] 6 of 4 [facility]    Date of Admission:10/1/21    Date of Anticipated Discharge:10/27/21                      Admitting Diagnoses:S/p upper or lower extremity.  Weakness R/T recent hospitalization/diagnoses/sequelae; will undergo therapies to rehab and improve strength, endurance and independence w/ ADLs  EXTREMITIES/VASCULAR:no cyanosis, clubbing or edema, radial pulses 2+ and dorsalis with belcher  -Belcher discontinued  -Patient continues to self cath  -Keflex 500 mg tid-stop on 10/23/21  -Florajen 1 capsule every day until 10/30/21     Insomnia  -Trazadone 50 mg qhs     Hypothyroidism  -TSH: 4.870; T4, Free: 1.3-->last taken on 8/6/21  -L

## 2021-10-24 ENCOUNTER — HOSPITAL ENCOUNTER (EMERGENCY)
Facility: HOSPITAL | Age: 86
Discharge: HOME | End: 2021-10-24
Attending: EMERGENCY MEDICINE
Payer: MEDICARE

## 2021-10-24 ENCOUNTER — APPOINTMENT (OUTPATIENT)
Dept: CT IMAGING | Facility: HOSPITAL | Age: 86
End: 2021-10-24
Attending: EMERGENCY MEDICINE
Payer: MEDICARE

## 2021-10-24 VITALS
BODY MASS INDEX: 26.66 KG/M2 | TEMPERATURE: 98 F | HEIGHT: 65 IN | OXYGEN SATURATION: 95 % | SYSTOLIC BLOOD PRESSURE: 149 MMHG | DIASTOLIC BLOOD PRESSURE: 79 MMHG | RESPIRATION RATE: 14 BRPM | HEART RATE: 71 BPM | WEIGHT: 160 LBS
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
## 2021-10-24 DIAGNOSIS — S09.90XA INJURY OF HEAD, INITIAL ENCOUNTER: ICD-10-CM

## 2021-10-24 PROCEDURE — 99284 EMERGENCY DEPT VISIT MOD MDM: CPT

## 2021-10-24 PROCEDURE — 72125 CT NECK SPINE W/O DYE: CPT | Performed by: EMERGENCY MEDICINE

## 2021-10-24 PROCEDURE — 70450 CT HEAD/BRAIN W/O DYE: CPT | Performed by: EMERGENCY MEDICINE

## 2021-10-24 NOTE — ED INITIAL ASSESSMENT (HPI)
Patient here from NH via [facility] ambulance with c/o fall. Patient had unwitnessed fall at [facility]. Patient at baseline AOx1. Patient was found by staff on the floor, on his side. He initially c/o headache and dizziness but denies that now. Skin intact.

## 2021-10-24 NOTE — ED PROVIDER NOTES
Patient Seen in: [facility] Emergency Department      History   Patient presents with:  Fall    Stated Complaint: Fall    Subjective:   Patient is a 54-year-old male who had an unwitnessed fall at the nursing home today striking his head.   Patient h agitation. All other systems reviewed and are negative. Positive for stated complaint: Fall  Other systems are as noted in HPI. Constitutional and vital signs reviewed. All other systems reviewed and negative except as noted above.     Physical acute process c-collar removed patient has no pain with range of motion will DC home         MDM      Social -negative tobacco, negative etoh, negative drugs  FH -noncontributory  PMH-prior left hip fracture at nursing home occurring for, on Lovenox    Dif

## 2021-10-27 ENCOUNTER — TELEPHONE (OUTPATIENT)
Dept: INTERNAL MEDICINE CLINIC | Facility: CLINIC | Age: 86
End: 2021-10-27

## 2021-10-27 NOTE — TELEPHONE ENCOUNTER
Austin Peters with [facility] stated that pt was seen today and is doing well. Pt has 5 nursing visits approved through insurance. Nursing will be at pt home for 1 visit this week, 2 visits next week and then 1 visit a week for the 3 weeks after. Physical therapy will be at pt home tomorrow and the occupational therapy will be at pt home on Friday.      Austin Peters [phone]

## 2021-10-28 ENCOUNTER — PATIENT OUTREACH (OUTPATIENT)
Dept: CASE MANAGEMENT | Age: 86
End: 2021-10-28

## 2021-10-28 DIAGNOSIS — Z02.9 ENCOUNTERS FOR ADMINISTRATIVE PURPOSE: ICD-10-CM

## 2021-10-28 DIAGNOSIS — S72.009A CLOSED FRACTURE OF HIP, UNSPECIFIED LATERALITY, INITIAL ENCOUNTER (HCC): ICD-10-CM

## 2021-10-28 PROCEDURE — 1111F DSCHRG MED/CURRENT MED MERGE: CPT

## 2021-10-28 NOTE — PROGRESS NOTES
Initial Post Discharge Follow Up   Discharge Date from SNF on : 10/26/21  Contact Date: 10/28/2021    Consent Verification:  Assessment Completed With: Spouse: Gogo Permission received per patient?  written  HIPAA Verified?   Yes    Discharge Dx:  S/p SA Inject 0.4 mL (40 mg total) into the skin daily for 42 doses. 42 each 0   • Probiotic Product (PROBIOTIC OR) Take 1 tablet by mouth daily.      • Levothyroxine Sodium 75 MCG Oral Tab Take 1 tablet (75 mcg total) by mouth before breakfast. 90 tablet 0   • [facility] ([facility])            [address]  [address] [place] [address]  [place]  [id number] [phone]          PCP TCM/HFU appointment: scheduled at D/C with

## 2021-10-29 ENCOUNTER — OFFICE VISIT (OUTPATIENT)
Dept: INTERNAL MEDICINE CLINIC | Facility: CLINIC | Age: 86
End: 2021-10-29
Payer: MEDICARE

## 2021-10-29 VITALS
HEIGHT: 62 IN | RESPIRATION RATE: 18 BRPM | TEMPERATURE: 97 F | DIASTOLIC BLOOD PRESSURE: 78 MMHG | OXYGEN SATURATION: 96 % | BODY MASS INDEX: 28.34 KG/M2 | SYSTOLIC BLOOD PRESSURE: 118 MMHG | HEART RATE: 69 BPM | WEIGHT: 154 LBS

## 2021-10-29 DIAGNOSIS — S72.009A CLOSED FRACTURE OF HIP, UNSPECIFIED LATERALITY, INITIAL ENCOUNTER (HCC): Primary | ICD-10-CM

## 2021-10-29 DIAGNOSIS — R07.9 ACUTE CHEST PAIN: ICD-10-CM
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
## 2021-10-29 DIAGNOSIS — I10 PRIMARY HYPERTENSION: ICD-10-CM

## 2021-10-29 DIAGNOSIS — N31.9 NEUROGENIC BLADDER: Chronic | ICD-10-CM

## 2021-10-29 DIAGNOSIS — N39.0 URINARY TRACT INFECTION WITHOUT HEMATURIA, SITE UNSPECIFIED: ICD-10-CM

## 2021-10-29 PROCEDURE — 3074F SYST BP LT 130 MM HG: CPT | Performed by: NURSE PRACTITIONER

## 2021-10-29 PROCEDURE — 3008F BODY MASS INDEX DOCD: CPT | Performed by: NURSE PRACTITIONER

## 2021-10-29 PROCEDURE — 99495 TRANSJ CARE MGMT MOD F2F 14D: CPT | Performed by: NURSE PRACTITIONER

## 2021-10-29 PROCEDURE — 1111F DSCHRG MED/CURRENT MED MERGE: CPT | Performed by: NURSE PRACTITIONER

## 2021-10-29 PROCEDURE — 3078F DIAST BP <80 MM HG: CPT | Performed by: NURSE PRACTITIONER

## 2021-10-29 RX ORDER — ENOXAPARIN SODIUM 100 MG/ML
40 INJECTION SUBCUTANEOUS DAILY
Qty: 15 EACH | Refills: 0 | Status: SHIPPED | OUTPATIENT
Start: 2021-10-29 | End: 2021-11-13

## 2021-10-29 NOTE — PROGRESS NOTES
TRANSITIONAL CARE CLINIC PHARMACIST MEDICATION RECONCILIATION        Devon Kayser MRN [id number]    1926 PCP Veronica Brannon MD       Comments: Medication history completed by the [facility] Pharmacist with the patient and wife and order for 15 additional syringes to get through November 13th. Reports that Jomar Morales was having some issues with constipation. Gave 1 tablet of Bisacodyl yesterday with no results. Gave 2 tablets today and patient has had a good bowel movement.   Ti

## 2021-10-29 NOTE — PATIENT INSTRUCTIONS
Patient Instructions:      1. Follow up with the Orthopedic Dr. Aman Mcgregor on   November 3, at 1:30pm     [address] [phone]    2.  Follow up with Urology Dr. Sharyle Albe on   December 20, at 2:30pm     [address] [facility] 25 902

## 2021-10-29 NOTE — PROGRESS NOTES
[facility]      HISTORY   CHIEF COMPLAINT: HFU-Acute chest pain, UTI, recent closed hip fracture, and recent fall.      HPI: Juliana Galeas is a 80year old male here today for hospital follow up for acute ch catheterization. She does report he does having some intermittent trouble with constipation and wife gave Dulcolax 1 tab yesterday and 2 tabs today and patient is moving his bowels as of today.  No other concerns at time of exam.     Interactive contact wit , Rfl:   Vitamin D3 (VITAMIN D3) 1000 UNITS Oral Tab, Take 1,000 Units by mouth daily. , Disp: , Rfl:     No current facility-administered medications for this visit.       HISTORY: reconciled and reviewed with patient  Past Medical History:   Diagnosis Date AM     Finalized by (CST): Diana Frances MD on 10/24/2021 at 5:48 AM       CT SPINE CERVICAL (CPT=72125)    Result Date: 10/24/2021  CONCLUSION:  1. No acute fractures. 2. Multilevel degenerative disc disease 3.  Preliminary report was provided by [facility] constipation, denies diarrhea, nausea, vomiting   MUSCULOSKELETAL: denies pain, states abnormal range of motion of extremities  NEUROLOGIC: + confusion, + balance difficulty  PSYCHIATRIC: hx depression or anxiety  HEMATOLOGIC: + anemia, denies bruising, bl Lovenox 40mg daily through 11/13  · Monitor for any s/s of bleeding  · Follow-up with orthopedics Dr. Aman Mcgregor on 12/15 at 1:30pm    4.  Fall, subsequent encounter  · No falls since discharge from ED  · Maintain fall precautions  · Supportive care, anticipate Health Maintenance:  Annual Wellness Visit due on 07/30/2022  Annual Depression Screen due on 10/16/2022  Fall Risk Screening due on 10/24/2022  Influenza Vaccine Completed  Pneumococcal Vaccine: 65+ Years Completed  Zoster Vaccines Completed  COVI [facility] 8 [facility] [place]   12/15/2021  1:30 PM [facility] Bullock [facility]           1. Transitional Care Clinic Visit: Discharged  2. PCP Visit: 12/13/2021  3.   Chronic Care Management: N/A     KISHORE Samaniego, 10/29/2021  Breanna Clark

## 2021-11-02 ENCOUNTER — TELEPHONE (OUTPATIENT)
Dept: INTERNAL MEDICINE CLINIC | Facility: CLINIC | Age: 86
End: 2021-11-02

## 2021-11-04 PROBLEM — I10 PRIMARY HYPERTENSION: Status: ACTIVE | Noted: 2018-04-17
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
## 2021-11-04 NOTE — TELEPHONE ENCOUNTER
Neha Dumont from [place] called following up on the fax. Informed her we received the fx    She requests that  reviews, signs, dates, and faxes it back over to them.     Fx: [phone]

## 2021-11-08 ENCOUNTER — TELEPHONE (OUTPATIENT)
Dept: INTERNAL MEDICINE CLINIC | Facility: CLINIC | Age: 86
End: 2021-11-08

## 2021-11-08 NOTE — TELEPHONE ENCOUNTER
Incoming fax from [facility] with order# [id number] and [id number]  Placed in VM in-basket for a signature

## 2021-11-09 ENCOUNTER — TELEPHONE (OUTPATIENT)
Dept: INTERNAL MEDICINE CLINIC | Facility: CLINIC | Age: 86
End: 2021-11-09

## 2021-11-09 NOTE — TELEPHONE ENCOUNTER
Incoming fax from [facility] with [email or web address]#[id number] to be reviewed and signed   Placed in VM in-basket

## 2021-11-19 NOTE — TELEPHONE ENCOUNTER
Elizabet from [facility] calling requesting update on order     Please advise   [phone]

## 2021-11-19 NOTE — TELEPHONE ENCOUNTER
Elizabet from [facility] calling for update on orders     Please advise  [phone]

## 2021-12-01 ENCOUNTER — TELEPHONE (OUTPATIENT)
Dept: FAMILY MEDICINE CLINIC | Facility: CLINIC | Age: 86
End: 2021-12-01

## 2021-12-01 NOTE — TELEPHONE ENCOUNTER
Spoke with Elizabet from [facility] and made her aware of below. Patient has upcoming visit scheduled. Verbalized understanding.      Future Appointments   Date Time Provider Betty Gomez   12/13/2021  2:30 PM Pa Miranda MD [facility] 8 [facility] [place]

## 2021-12-01 NOTE — TELEPHONE ENCOUNTER
[facility] calling stating that Rodney Brown will not sign orders bc he has not seen pt recently. Asking since Desmond Neves has seen him last if he would sign orders. Kelly Alvarenga is going to fax over orders. Will Dr. Talat Younger sign?  Please advise

## 2021-12-02 NOTE — TELEPHONE ENCOUNTER
Called and spoke with Theresa Oquendo RN @[facility] to inform per Dr. Surjit Gardiner will sign [facility] orders for pt. RN inquired if we have received faxed [facility] orders as was faxed yesterday (12/1/21).  Informed will confirm with Dr. Moise Andre MA, if not, will call back to request to re-fa

## 2021-12-02 NOTE — TELEPHONE ENCOUNTER
Elizabet from [place] [address] called asking for Dr Tuan Richter to sign into their portal to sign pt's orders.

## 2021-12-02 NOTE — TELEPHONE ENCOUNTER
Timothy Mcneal RN @[facility] f/u to inform per Dr. FIDEL CARRANZA has not received any paperwork. Requested to re-fax [facility] orders to our office fax# [phone]. To call back at the office if any further questions.

## 2021-12-13 ENCOUNTER — LAB ENCOUNTER (OUTPATIENT)
Dept: LAB | Age: 86
End: 2021-12-13
Attending: INTERNAL MEDICINE
Payer: MEDICARE

## 2021-12-13 ENCOUNTER — OFFICE VISIT (OUTPATIENT)
Dept: INTERNAL MEDICINE CLINIC | Facility: CLINIC | Age: 86
End: 2021-12-13
Payer: MEDICARE

## 2021-12-13 VITALS
OXYGEN SATURATION: 96 % | DIASTOLIC BLOOD PRESSURE: 74 MMHG | HEART RATE: 76 BPM | RESPIRATION RATE: 18 BRPM | TEMPERATURE: 98 F | BODY MASS INDEX: 27.6 KG/M2 | WEIGHT: 150 LBS | HEIGHT: 62 IN | SYSTOLIC BLOOD PRESSURE: 130 MMHG

## 2021-12-13 DIAGNOSIS — S72.002A CLOSED FRACTURE OF LEFT HIP, INITIAL ENCOUNTER (HCC): ICD-10-CM

## 2021-12-13 DIAGNOSIS — D64.9 ANEMIA, UNSPECIFIED TYPE: ICD-10-CM

## 2021-12-13 DIAGNOSIS — D64.9 ANEMIA, UNSPECIFIED TYPE: Primary | ICD-10-CM

## 2021-12-13 PROBLEM — S72.009A CLOSED FRACTURE OF HIP (HCC): Chronic | Status: ACTIVE | Noted: 2021-09-29
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
## 2021-12-13 PROBLEM — I10 PRIMARY HYPERTENSION: Status: RESOLVED | Noted: 2018-04-17 | Resolved: 2021-12-13

## 2021-12-13 PROBLEM — R07.9 ACUTE CHEST PAIN: Status: RESOLVED | Noted: 2021-10-16 | Resolved: 2021-12-13

## 2021-12-13 PROBLEM — E87.6 HYPOKALEMIA: Status: RESOLVED | Noted: 2021-09-29 | Resolved: 2021-12-13

## 2021-12-13 PROBLEM — R79.89 AZOTEMIA: Status: RESOLVED | Noted: 2021-09-29 | Resolved: 2021-12-13

## 2021-12-13 PROBLEM — R73.9 HYPERGLYCEMIA: Status: RESOLVED | Noted: 2021-09-29 | Resolved: 2021-12-13

## 2021-12-13 PROCEDURE — 3008F BODY MASS INDEX DOCD: CPT | Performed by: INTERNAL MEDICINE

## 2021-12-13 PROCEDURE — 3075F SYST BP GE 130 - 139MM HG: CPT | Performed by: INTERNAL MEDICINE

## 2021-12-13 PROCEDURE — 80048 BASIC METABOLIC PNL TOTAL CA: CPT

## 2021-12-13 PROCEDURE — 85025 COMPLETE CBC W/AUTO DIFF WBC: CPT

## 2021-12-13 PROCEDURE — 99495 TRANSJ CARE MGMT MOD F2F 14D: CPT | Performed by: INTERNAL MEDICINE

## 2021-12-13 PROCEDURE — 3078F DIAST BP <80 MM HG: CPT | Performed by: INTERNAL MEDICINE

## 2021-12-13 PROCEDURE — 36415 COLL VENOUS BLD VENIPUNCTURE: CPT

## 2021-12-13 PROCEDURE — 1111F DSCHRG MED/CURRENT MED MERGE: CPT | Performed by: INTERNAL MEDICINE

## 2021-12-13 NOTE — PATIENT INSTRUCTIONS
Patient has a follow-up appointment with his orthopod in the next few days. Further recommendations once blood work is available.

## 2021-12-13 NOTE — PROGRESS NOTES
Dino Layton  1926 is a 80year old male. Patient presents with:  Hospital F/U       HPI:   Patient had a fracture of the hip. Underwent internal fixation with intramedullary maricel.   Patient had a rehab residence he had not Jannelle Dance social    Drug use: No       REVIEW OF SYSTEMS:   Cardiovascular:   Syncope none. Rapid heart beat at rest no. Change in exercise tolerance no. Chest pain no. Chest pain while awake none. Cold extremities no. Dizziness no. Dyspnea on exertion none.  Faintin

## 2021-12-14 ENCOUNTER — TELEPHONE (OUTPATIENT)
Dept: INTERNAL MEDICINE CLINIC | Facility: CLINIC | Age: 86
End: 2021-12-14

## 2021-12-14 NOTE — TELEPHONE ENCOUNTER
Faxed signed order for bedside commode to [facility]   Confirmation received   Sent to scan and copy placed in accordion

## 2021-12-14 NOTE — TELEPHONE ENCOUNTER
Faxed signed orders, Order# [id number], [id number], [id number], to [facility]   Confirmation received   Sent to scan and copy placed in accordion

## 2022-02-09 ENCOUNTER — TELEPHONE (OUTPATIENT)
Dept: INTERNAL MEDICINE CLINIC | Facility: CLINIC | Age: 87
End: 2022-02-09

## 2022-02-09 NOTE — TELEPHONE ENCOUNTER
Patient spouse dropped off parking placard renewal form for completion. Placed in Dr Marjan Cole fax folder. Also attached with mailing envelope. Please advise.

## 2022-02-21 NOTE — TELEPHONE ENCOUNTER
Pt would like to know if another doctor would be able to complete the parking placard form. Would like a call with an update.

## 2022-04-14 ENCOUNTER — TELEPHONE (OUTPATIENT)
Dept: INTERNAL MEDICINE CLINIC | Facility: CLINIC | Age: 87
End: 2022-04-14

## 2022-04-25 ENCOUNTER — APPOINTMENT (OUTPATIENT)
Dept: CT IMAGING | Facility: HOSPITAL | Age: 87
End: 2022-04-25
Attending: EMERGENCY MEDICINE
Payer: MEDICARE

## 2022-04-25 ENCOUNTER — APPOINTMENT (OUTPATIENT)
Dept: GENERAL RADIOLOGY | Facility: HOSPITAL | Age: 87
End: 2022-04-25
Attending: EMERGENCY MEDICINE
Payer: MEDICARE

## 2022-04-25 ENCOUNTER — APPOINTMENT (OUTPATIENT)
Dept: ULTRASOUND IMAGING | Facility: HOSPITAL | Age: 87
End: 2022-04-25
Attending: NURSE PRACTITIONER
Payer: MEDICARE

## 2022-04-25 ENCOUNTER — APPOINTMENT (OUTPATIENT)
Dept: CV DIAGNOSTICS | Facility: HOSPITAL | Age: 87
End: 2022-04-25
Attending: INTERNAL MEDICINE
Payer: MEDICARE

## 2022-04-25 ENCOUNTER — HOSPITAL ENCOUNTER (INPATIENT)
Facility: HOSPITAL | Age: 87
LOS: 4 days | Discharge: HOME HEALTH | End: 2022-04-29
Attending: EMERGENCY MEDICINE | Admitting: INTERNAL MEDICINE
Payer: MEDICARE

## 2022-04-25 DIAGNOSIS — I60.9 SUBARACHNOID HEMORRHAGE (HCC): ICD-10-CM

## 2022-04-25 DIAGNOSIS — S06.5X9A ACUTE SUBDURAL HEMATOMA (HCC): ICD-10-CM

## 2022-04-25 DIAGNOSIS — S22.079A CLOSED FRACTURE OF NINTH THORACIC VERTEBRA, UNSPECIFIED FRACTURE MORPHOLOGY, INITIAL ENCOUNTER (HCC): ICD-10-CM

## 2022-04-25 DIAGNOSIS — I26.92 ACUTE SADDLE PULMONARY EMBOLISM, UNSPECIFIED WHETHER ACUTE COR PULMONALE PRESENT (HCC): Primary | ICD-10-CM

## 2022-04-25 DIAGNOSIS — U07.1 COVID: ICD-10-CM

## 2022-04-25 PROBLEM — S06.5XAA ACUTE SUBDURAL HEMATOMA (HCC): Status: ACTIVE | Noted: 2022-04-25

## 2022-04-25 LAB
ALBUMIN SERPL-MCNC: 2.7 G/DL (ref 3.4–5)
ALBUMIN SERPL-MCNC: 2.8 G/DL (ref 3.4–5)
ALBUMIN/GLOB SERPL: 0.7 {RATIO} (ref 1–2)
ALBUMIN/GLOB SERPL: 0.7 {RATIO} (ref 1–2)
ALP LIVER SERPL-CCNC: 102 U/L
ALP LIVER SERPL-CCNC: 97 U/L
ALT SERPL-CCNC: 27 U/L
ALT SERPL-CCNC: 31 U/L
ANION GAP SERPL CALC-SCNC: 5 MMOL/L (ref 0–18)
ANION GAP SERPL CALC-SCNC: 6 MMOL/L (ref 0–18)
APTT PPP: 35 SECONDS (ref 23.3–35.6)
APTT PPP: 53.3 SECONDS (ref 23.3–35.6)
ARTERIAL PATENCY WRIST A: POSITIVE
AST SERPL-CCNC: 28 U/L (ref 15–37)
AST SERPL-CCNC: 30 U/L (ref 15–37)
ATRIAL RATE: 72 BPM
BASE EXCESS BLDA CALC-SCNC: 4.1 MMOL/L (ref ?–2)
BASOPHILS # BLD AUTO: 0.06 X10(3) UL (ref 0–0.2)
BASOPHILS NFR BLD AUTO: 0.6 %
BILIRUB SERPL-MCNC: 1 MG/DL (ref 0.1–2)
BILIRUB SERPL-MCNC: 1.4 MG/DL (ref 0.1–2)
BILIRUB UR QL STRIP.AUTO: NEGATIVE
BODY TEMPERATURE: 98.6 F
BUN BLD-MCNC: 15 MG/DL (ref 7–18)
BUN BLD-MCNC: 15 MG/DL (ref 7–18)
CA-I BLD-SCNC: 1.19 MMOL/L (ref 0.95–1.32)
CALCIUM BLD-MCNC: 8.7 MG/DL (ref 8.5–10.1)
CALCIUM BLD-MCNC: 8.9 MG/DL (ref 8.5–10.1)
CHLORIDE SERPL-SCNC: 104 MMOL/L (ref 98–112)
CHLORIDE SERPL-SCNC: 105 MMOL/L (ref 98–112)
CK SERPL-CCNC: 45 U/L
CO2 SERPL-SCNC: 28 MMOL/L (ref 21–32)
CO2 SERPL-SCNC: 28 MMOL/L (ref 21–32)
COHGB MFR BLD: 2.7 % SAT (ref 0–3)
CREAT BLD-MCNC: 0.89 MG/DL
CREAT BLD-MCNC: 0.9 MG/DL
CRP SERPL-MCNC: 3.08 MG/DL (ref ?–0.3)
D DIMER PPP FEU-MCNC: >20 UG/ML FEU (ref ?–0.96)
DEPRECATED HBV CORE AB SER IA-ACNC: 3064.7 NG/ML
EOSINOPHIL # BLD AUTO: 0 X10(3) UL (ref 0–0.7)
EOSINOPHIL NFR BLD AUTO: 0 %
ERYTHROCYTE [DISTWIDTH] IN BLOOD BY AUTOMATED COUNT: 13.2 %
GLOBULIN PLAS-MCNC: 3.7 G/DL (ref 2.8–4.4)
GLOBULIN PLAS-MCNC: 4.2 G/DL (ref 2.8–4.4)
GLUCOSE BLD-MCNC: 121 MG/DL (ref 70–99)
GLUCOSE BLD-MCNC: 136 MG/DL (ref 70–99)
GLUCOSE UR STRIP.AUTO-MCNC: NEGATIVE MG/DL
HCO3 BLDA-SCNC: 27.9 MEQ/L (ref 21–27)
HCT VFR BLD AUTO: 41.9 %
HGB BLD-MCNC: 13.6 G/DL
HGB BLD-MCNC: 13.7 G/DL
IMM GRANULOCYTES # BLD AUTO: 0.31 X10(3) UL (ref 0–1)
IMM GRANULOCYTES NFR BLD: 3.1 %
INR BLD: 1.18 (ref 0.8–1.2)
KETONES UR STRIP.AUTO-MCNC: NEGATIVE MG/DL
L/M: 4.5 L/MIN
LACTATE BLD-SCNC: 1 MMOL/L (ref 0.5–2)
LDH SERPL L TO P-CCNC: 339 U/L
LYMPHOCYTES # BLD AUTO: 2.98 X10(3) UL (ref 1–4)
LYMPHOCYTES NFR BLD AUTO: 29.9 %
MAGNESIUM SERPL-MCNC: 1.8 MG/DL (ref 1.6–2.6)
MCH RBC QN AUTO: 30.8 PG (ref 26–34)
MCHC RBC AUTO-ENTMCNC: 32.5 G/DL (ref 31–37)
MCV RBC AUTO: 94.8 FL
METHGB MFR BLD: 1 % SAT (ref 0.4–1.5)
MONOCYTES # BLD AUTO: 0.71 X10(3) UL (ref 0.1–1)
MONOCYTES NFR BLD AUTO: 7.1 %
NEUTROPHILS # BLD AUTO: 5.92 X10 (3) UL (ref 1.5–7.7)
NEUTROPHILS # BLD AUTO: 5.92 X10(3) UL (ref 1.5–7.7)
NEUTROPHILS NFR BLD AUTO: 59.3 %
NITRITE UR QL STRIP.AUTO: POSITIVE
NT-PROBNP SERPL-MCNC: 318 PG/ML (ref ?–450)
OSMOLALITY SERPL CALC.SUM OF ELEC: 288 MOSM/KG (ref 275–295)
OSMOLALITY SERPL CALC.SUM OF ELEC: 289 MOSM/KG (ref 275–295)
OXYHGB MFR BLDA: 90.6 % (ref 92–100)
P AXIS: 75 DEGREES
P-R INTERVAL: 190 MS
PCO2 BLDA: 36 MM HG (ref 35–45)
PH BLDA: 7.49 [PH] (ref 7.35–7.45)
PH UR STRIP.AUTO: 6 [PH] (ref 5–8)
PLATELET # BLD AUTO: 227 10(3)UL (ref 150–450)
PO2 BLDA: 61 MM HG (ref 80–100)
POTASSIUM BLD-SCNC: 3.1 MMOL/L (ref 3.6–5.1)
POTASSIUM SERPL-SCNC: 3.3 MMOL/L (ref 3.5–5.1)
POTASSIUM SERPL-SCNC: 3.8 MMOL/L (ref 3.5–5.1)
PROCALCITONIN SERPL-MCNC: <0.05 NG/ML (ref ?–0.16)
PROT SERPL-MCNC: 6.4 G/DL (ref 6.4–8.2)
PROT SERPL-MCNC: 7 G/DL (ref 6.4–8.2)
PROT UR STRIP.AUTO-MCNC: 30 MG/DL
PROTHROMBIN TIME: 15.1 SECONDS (ref 11.6–14.8)
Q-T INTERVAL: 410 MS
QRS DURATION: 84 MS
QTC CALCULATION (BEZET): 448 MS
R AXIS: 27 DEGREES
RBC # BLD AUTO: 4.42 X10(6)UL
RBC #/AREA URNS AUTO: >10 /HPF
SARS-COV-2 RNA RESP QL NAA+PROBE: DETECTED
SODIUM BLD-SCNC: 134 MMOL/L (ref 135–145)
SODIUM SERPL-SCNC: 138 MMOL/L (ref 136–145)
SODIUM SERPL-SCNC: 138 MMOL/L (ref 136–145)
SP GR UR STRIP.AUTO: 1.01 (ref 1–1.03)
T AXIS: 96 DEGREES
TROPONIN I HIGH SENSITIVITY: 11 NG/L
TROPONIN I HIGH SENSITIVITY: 9 NG/L
UROBILINOGEN UR STRIP.AUTO-MCNC: 4 MG/DL
VENTRICULAR RATE: 72 BPM
WBC # BLD AUTO: 10 X10(3) UL (ref 4–11)
WBC #/AREA URNS AUTO: >50 /HPF

## 2022-04-25 PROCEDURE — 71275 CT ANGIOGRAPHY CHEST: CPT | Performed by: EMERGENCY MEDICINE

## 2022-04-25 PROCEDURE — 99291 CRITICAL CARE FIRST HOUR: CPT | Performed by: INTERNAL MEDICINE

## 2022-04-25 PROCEDURE — 93306 TTE W/DOPPLER COMPLETE: CPT | Performed by: INTERNAL MEDICINE

## 2022-04-25 PROCEDURE — 70450 CT HEAD/BRAIN W/O DYE: CPT | Performed by: EMERGENCY MEDICINE

## 2022-04-25 PROCEDURE — 93970 EXTREMITY STUDY: CPT | Performed by: NURSE PRACTITIONER

## 2022-04-25 PROCEDURE — 71045 X-RAY EXAM CHEST 1 VIEW: CPT | Performed by: EMERGENCY MEDICINE

## 2022-04-25 PROCEDURE — 72125 CT NECK SPINE W/O DYE: CPT | Performed by: EMERGENCY MEDICINE

## 2022-04-25 PROCEDURE — 8E0ZXY6 ISOLATION: ICD-10-PCS | Performed by: EMERGENCY MEDICINE

## 2022-04-25 RX ORDER — METOCLOPRAMIDE HYDROCHLORIDE 5 MG/ML
5 INJECTION INTRAMUSCULAR; INTRAVENOUS EVERY 8 HOURS PRN
Status: DISCONTINUED | OUTPATIENT
Start: 2022-04-25 | End: 2022-04-29

## 2022-04-25 RX ORDER — MELATONIN
3 NIGHTLY PRN
Status: DISCONTINUED | OUTPATIENT
Start: 2022-04-25 | End: 2022-04-29

## 2022-04-25 RX ORDER — IOHEXOL 350 MG/ML
100 INJECTION, SOLUTION INTRAVENOUS
Status: COMPLETED | OUTPATIENT
Start: 2022-04-25 | End: 2022-04-25

## 2022-04-25 RX ORDER — ONDANSETRON 2 MG/ML
4 INJECTION INTRAMUSCULAR; INTRAVENOUS EVERY 6 HOURS PRN
Status: DISCONTINUED | OUTPATIENT
Start: 2022-04-25 | End: 2022-04-29

## 2022-04-25 RX ORDER — SODIUM PHOSPHATE, DIBASIC AND SODIUM PHOSPHATE, MONOBASIC 7; 19 G/133ML; G/133ML
1 ENEMA RECTAL ONCE AS NEEDED
Status: DISCONTINUED | OUTPATIENT
Start: 2022-04-25 | End: 2022-04-29

## 2022-04-25 RX ORDER — HEPARIN SODIUM AND DEXTROSE 10000; 5 [USP'U]/100ML; G/100ML
12 INJECTION INTRAVENOUS ONCE
Status: COMPLETED | OUTPATIENT
Start: 2022-04-25 | End: 2022-04-25

## 2022-04-25 RX ORDER — SENNOSIDES 8.6 MG
17.2 TABLET ORAL NIGHTLY PRN
Status: DISCONTINUED | OUTPATIENT
Start: 2022-04-25 | End: 2022-04-29

## 2022-04-25 RX ORDER — POTASSIUM CHLORIDE 20 MEQ/1
40 TABLET, EXTENDED RELEASE ORAL ONCE
Status: COMPLETED | OUTPATIENT
Start: 2022-04-25 | End: 2022-04-25

## 2022-04-25 RX ORDER — ACETAMINOPHEN 650 MG/1
650 SUPPOSITORY RECTAL EVERY 4 HOURS PRN
Status: DISCONTINUED | OUTPATIENT
Start: 2022-04-25 | End: 2022-04-26

## 2022-04-25 RX ORDER — HYDRALAZINE HYDROCHLORIDE 20 MG/ML
10 INJECTION INTRAMUSCULAR; INTRAVENOUS EVERY 2 HOUR PRN
Status: DISCONTINUED | OUTPATIENT
Start: 2022-04-25 | End: 2022-04-29

## 2022-04-25 RX ORDER — LABETALOL HYDROCHLORIDE 5 MG/ML
10 INJECTION, SOLUTION INTRAVENOUS EVERY 10 MIN PRN
Status: DISCONTINUED | OUTPATIENT
Start: 2022-04-25 | End: 2022-04-29

## 2022-04-25 RX ORDER — ACETAMINOPHEN 325 MG/1
650 TABLET ORAL EVERY 4 HOURS PRN
Status: DISCONTINUED | OUTPATIENT
Start: 2022-04-25 | End: 2022-04-26

## 2022-04-25 RX ORDER — BISACODYL 10 MG
10 SUPPOSITORY, RECTAL RECTAL
Status: DISCONTINUED | OUTPATIENT
Start: 2022-04-25 | End: 2022-04-29

## 2022-04-25 RX ORDER — AMOXICILLIN 500 MG/1
500 CAPSULE ORAL 2 TIMES DAILY
COMMUNITY
Start: 2022-04-22 | End: 2022-04-29

## 2022-04-25 RX ORDER — MAGNESIUM OXIDE 400 MG/1
400 TABLET ORAL ONCE
Status: COMPLETED | OUTPATIENT
Start: 2022-04-25 | End: 2022-04-25

## 2022-04-25 RX ORDER — HEPARIN SODIUM AND DEXTROSE 10000; 5 [USP'U]/100ML; G/100ML
INJECTION INTRAVENOUS CONTINUOUS
Status: DISCONTINUED | OUTPATIENT
Start: 2022-04-25 | End: 2022-04-27

## 2022-04-25 RX ORDER — SODIUM CHLORIDE 9 MG/ML
INJECTION, SOLUTION INTRAVENOUS CONTINUOUS
Status: DISCONTINUED | OUTPATIENT
Start: 2022-04-25 | End: 2022-04-25

## 2022-04-25 RX ORDER — ACETAMINOPHEN 500 MG
1000 TABLET ORAL EVERY 8 HOURS PRN
Status: DISCONTINUED | OUTPATIENT
Start: 2022-04-25 | End: 2022-04-29

## 2022-04-25 RX ORDER — POLYETHYLENE GLYCOL 3350 17 G/17G
17 POWDER, FOR SOLUTION ORAL DAILY PRN
Status: DISCONTINUED | OUTPATIENT
Start: 2022-04-25 | End: 2022-04-29

## 2022-04-25 RX ORDER — LORAZEPAM 2 MG/ML
1 INJECTION INTRAMUSCULAR
Status: DISCONTINUED | OUTPATIENT
Start: 2022-04-25 | End: 2022-04-29

## 2022-04-25 RX ORDER — LEVOTHYROXINE SODIUM 0.07 MG/1
75 TABLET ORAL
Status: DISCONTINUED | OUTPATIENT
Start: 2022-04-25 | End: 2022-04-29

## 2022-04-25 NOTE — ED QUICK NOTES
Orders for admission, patient is aware of plan and ready to go upstairs. Any questions, please call ED RN Ximena Zuniga at extension [phone].      Patient Covid vaccination status: Fully vaccinated     COVID Test Ordered in ED: Rapid SARS-CoV-2 by PCR - Covid positive    COVID Suspicion at Admission: N/A    Running Infusions:  None    Mental Status/LOC at time of transport: a&ox2    Other pertinent information:   CIWA score: N/A   NIH score:  N/A

## 2022-04-25 NOTE — ED INITIAL ASSESSMENT (HPI)
Pt to the ER via [place] for a fall this morning. Per EMS patient seemed to be looking out the window when he fell to his knees and then back on his head. Pt has no complaints of CP, SOB, N/V at this time. Pt presents to the ER a&ox2. Respirations even and nonlabored. Skin warm and dry.

## 2022-04-26 ENCOUNTER — APPOINTMENT (OUTPATIENT)
Dept: CT IMAGING | Facility: HOSPITAL | Age: 87
End: 2022-04-26
Attending: NEUROLOGICAL SURGERY
Payer: MEDICARE

## 2022-04-26 ENCOUNTER — APPOINTMENT (OUTPATIENT)
Dept: CV DIAGNOSTICS | Facility: HOSPITAL | Age: 87
End: 2022-04-26
Attending: INTERNAL MEDICINE
Payer: MEDICARE

## 2022-04-26 ENCOUNTER — APPOINTMENT (OUTPATIENT)
Dept: CT IMAGING | Facility: HOSPITAL | Age: 87
End: 2022-04-26
Attending: INTERNAL MEDICINE
Payer: MEDICARE

## 2022-04-26 ENCOUNTER — APPOINTMENT (OUTPATIENT)
Dept: GENERAL RADIOLOGY | Facility: HOSPITAL | Age: 87
End: 2022-04-26
Attending: NURSE PRACTITIONER
Payer: MEDICARE

## 2022-04-26 LAB
ANION GAP SERPL CALC-SCNC: 7 MMOL/L (ref 0–18)
APTT PPP: 66.9 SECONDS (ref 23.3–35.6)
BASOPHILS # BLD AUTO: 0.05 X10(3) UL (ref 0–0.2)
BASOPHILS NFR BLD AUTO: 0.5 %
BUN BLD-MCNC: 19 MG/DL (ref 7–18)
CALCIUM BLD-MCNC: 9 MG/DL (ref 8.5–10.1)
CHLORIDE SERPL-SCNC: 104 MMOL/L (ref 98–112)
CO2 SERPL-SCNC: 27 MMOL/L (ref 21–32)
CREAT BLD-MCNC: 0.77 MG/DL
EOSINOPHIL # BLD AUTO: 0 X10(3) UL (ref 0–0.7)
EOSINOPHIL NFR BLD AUTO: 0 %
ERYTHROCYTE [DISTWIDTH] IN BLOOD BY AUTOMATED COUNT: 13.5 %
GLUCOSE BLD-MCNC: 124 MG/DL (ref 70–99)
HCT VFR BLD AUTO: 40.4 %
HGB BLD-MCNC: 13.4 G/DL
IMM GRANULOCYTES # BLD AUTO: 0.1 X10(3) UL (ref 0–1)
IMM GRANULOCYTES NFR BLD: 1 %
LYMPHOCYTES # BLD AUTO: 0.72 X10(3) UL (ref 1–4)
LYMPHOCYTES NFR BLD AUTO: 6.9 %
MAGNESIUM SERPL-MCNC: 1.9 MG/DL (ref 1.6–2.6)
MCH RBC QN AUTO: 31.2 PG (ref 26–34)
MCHC RBC AUTO-ENTMCNC: 33.2 G/DL (ref 31–37)
MCV RBC AUTO: 94.2 FL
MONOCYTES # BLD AUTO: 0.59 X10(3) UL (ref 0.1–1)
MONOCYTES NFR BLD AUTO: 5.7 %
NEUTROPHILS # BLD AUTO: 8.96 X10 (3) UL (ref 1.5–7.7)
NEUTROPHILS # BLD AUTO: 8.96 X10(3) UL (ref 1.5–7.7)
NEUTROPHILS NFR BLD AUTO: 85.9 %
OSMOLALITY SERPL CALC.SUM OF ELEC: 290 MOSM/KG (ref 275–295)
PLATELET # BLD AUTO: 149 10(3)UL (ref 150–450)
POTASSIUM SERPL-SCNC: 3.7 MMOL/L (ref 3.5–5.1)
POTASSIUM SERPL-SCNC: 3.7 MMOL/L (ref 3.5–5.1)
RBC # BLD AUTO: 4.29 X10(6)UL
SARS-COV-2 RNA RESP QL NAA+PROBE: DETECTED
SODIUM SERPL-SCNC: 138 MMOL/L (ref 136–145)
WBC # BLD AUTO: 10.4 X10(3) UL (ref 4–11)

## 2022-04-26 PROCEDURE — 70450 CT HEAD/BRAIN W/O DYE: CPT | Performed by: NEUROLOGICAL SURGERY

## 2022-04-26 PROCEDURE — 99233 SBSQ HOSP IP/OBS HIGH 50: CPT | Performed by: INTERNAL MEDICINE

## 2022-04-26 PROCEDURE — 70450 CT HEAD/BRAIN W/O DYE: CPT | Performed by: INTERNAL MEDICINE

## 2022-04-26 PROCEDURE — 99291 CRITICAL CARE FIRST HOUR: CPT | Performed by: INTERNAL MEDICINE

## 2022-04-26 PROCEDURE — 72072 X-RAY EXAM THORAC SPINE 3VWS: CPT | Performed by: NURSE PRACTITIONER

## 2022-04-26 RX ORDER — POTASSIUM CHLORIDE 20 MEQ/1
40 TABLET, EXTENDED RELEASE ORAL ONCE
Status: DISCONTINUED | OUTPATIENT
Start: 2022-04-26 | End: 2022-04-26 | Stop reason: SDUPTHER

## 2022-04-26 RX ORDER — POTASSIUM CHLORIDE 14.9 MG/ML
20 INJECTION INTRAVENOUS ONCE
Status: COMPLETED | OUTPATIENT
Start: 2022-04-26 | End: 2022-04-26

## 2022-04-26 NOTE — PLAN OF CARE
Assumed care of Ming @ approximately 1920: awake, alert, oriented, pleasant, and cooperative with care, Q1H neuro checks as charted; on 6L HNC; NSR w/quite frequent PAC's and occasional PVC's on the monitor, heparin gtt infusing w/nest PTT in the AM; BLE US completed showing DVT in LLE -- result called to Jessica NOVAK neuro critical care; chronic Yeager catheter w/minimal urine out put, and no reports of pain this shift. Neuro critical care APRN notified Olegario Sol is harder to wake and assess at 0500 neuro check. Neuro check performed just prior to leaving for AM CT scan. Tanner more awake and cooperative after scan. Will await CT results. 0600 Olegario Sol is again lethargic. He will awaken with firm stimulation and state his name but not participate in assessment -critical care APRN aware. 0700 Olegario Sol is once again wide awake and participatory.     Please see flow-sheets for full assessments and/or additional information     Problem: Diabetes/Glucose Control  Goal: Glucose maintained within prescribed range  Description: INTERVENTIONS:  - Monitor Blood Glucose as ordered  - Assess for signs and symptoms of hyperglycemia and hypoglycemia  - Administer ordered medications to maintain glucose within target range  - Assess barriers to adequate nutritional intake and initiate nutrition consult as needed  - Instruct patient on self management of diabetes  Outcome: Progressing     Problem: PAIN - ADULT  Goal: Verbalizes/displays adequate comfort level or patient's stated pain goal  Description: INTERVENTIONS:  - Encourage pt to monitor pain and request assistance  - Assess pain using appropriate pain scale  - Administer analgesics based on type and severity of pain and evaluate response  - Implement non-pharmacological measures as appropriate and evaluate response  - Consider cultural and social influences on pain and pain management  - Manage/alleviate anxiety  - Utilize distraction and/or relaxation techniques  - Monitor for opioid side effects  - Notify MD/LIP if interventions unsuccessful or patient reports new pain  - Anticipate increased pain with activity and pre-medicate as appropriate  Outcome: Progressing     Problem: SAFETY ADULT - FALL  Goal: Free from fall injury  Description: INTERVENTIONS:  - Assess pt frequently for physical needs  - Identify cognitive and physical deficits and behaviors that affect risk of falls. - [place] fall precautions as indicated by assessment.  - Educate pt/family on patient safety including physical limitations  - Instruct pt to call for assistance with activity based on assessment  - Modify environment to reduce risk of injury  - Provide assistive devices as appropriate  - Consider OT/PT consult to assist with strengthening/mobility  - Encourage toileting schedule  Outcome: Progressing     Problem: CARDIOVASCULAR - ADULT  Goal: Maintains optimal cardiac output and hemodynamic stability  Description: INTERVENTIONS:  - Monitor vital signs, rhythm, and trends  - Monitor for bleeding, hypotension and signs of decreased cardiac output  - Evaluate effectiveness of vasoactive medications to optimize hemodynamic stability  - Monitor arterial and/or venous puncture sites for bleeding and/or hematoma  - Assess quality of pulses, skin color and temperature  - Assess for signs of decreased coronary artery perfusion - ex.  Angina  - Evaluate fluid balance, assess for edema, trend weights  Outcome: Progressing  Goal: Absence of cardiac arrhythmias or at baseline  Description: INTERVENTIONS:  - Continuous cardiac monitoring, monitor vital signs, obtain 12 lead EKG if indicated  - Evaluate effectiveness of antiarrhythmic and heart rate control medications as ordered  - Initiate emergency measures for life threatening arrhythmias  - Monitor electrolytes and administer replacement therapy as ordered  Outcome: Progressing     Problem: RESPIRATORY - ADULT  Goal: Achieves optimal ventilation and oxygenation  Description: INTERVENTIONS:  - Assess for changes in respiratory status  - Assess for changes in mentation and behavior  - Position to facilitate oxygenation and minimize respiratory effort  - Oxygen supplementation based on oxygen saturation or ABGs  - Provide Smoking Cessation handout, if applicable  - Encourage broncho-pulmonary hygiene including cough, deep breathe, Incentive Spirometry  - Assess the need for suctioning and perform as needed  - Assess and instruct to report SOB or any respiratory difficulty  - Respiratory Therapy support as indicated  - Manage/alleviate anxiety  - Monitor for signs/symptoms of CO2 retention  Outcome: Progressing     Problem: SKIN/TISSUE INTEGRITY - ADULT  Goal: Skin integrity remains intact  Description: INTERVENTIONS  - Assess and document risk factors for pressure ulcer development  - Assess and document skin integrity  - Monitor for areas of redness and/or skin breakdown  - Initiate interventions, skin care algorithm/standards of care as needed  Outcome: Progressing  Goal: Incision(s), wounds(s) or drain site(s) healing without S/S of infection  Description: INTERVENTIONS:  - Assess and document risk factors for pressure ulcer development  - Assess and document skin integrity  - Assess and document dressing/incision, wound bed, drain sites and surrounding tissue  - Implement wound care per orders  - Initiate isolation precautions as appropriate  - Initiate Pressure Ulcer prevention bundle as indicated  Outcome: Progressing     Problem: NEUROLOGICAL - ADULT  Goal: Achieves stable or improved neurological status  Description: INTERVENTIONS  - Assess for and report changes in neurological status  - Initiate measures to prevent increased intracranial pressure  - Maintain blood pressure and fluid volume within ordered parameters to optimize cerebral perfusion and minimize risk of hemorrhage  - Monitor temperature, glucose, and sodium.  Initiate appropriate interventions as ordered  Outcome: Progressing  Goal: Absence of seizures  Description: INTERVENTIONS  - Monitor for seizure activity  - Administer anti-seizure medications as ordered  - Monitor neurological status  Outcome: Progressing  Goal: Remains free of injury related to seizure activity  Description: INTERVENTIONS:  - Maintain airway, patient safety  and administer oxygen as ordered  - Monitor patient for seizure activity, document and report duration and description of seizure to MD/LIP  - If seizure occurs, turn patient to side and suction secretions as needed  - Reorient patient post seizure  - Seizure pads on all 4 side rails  - Instruct patient/family to notify RN of any seizure activity  - Instruct patient/family to call for assistance with activity based on assessment  Outcome: Progressing  Goal: Achieves maximal functionality and self care  Description: INTERVENTIONS  - Monitor swallowing and airway patency with patient fatigue and changes in neurological status  - Encourage and assist patient to increase activity and self care with guidance from PT/OT  - Encourage visually impaired, hearing impaired and aphasic patients to use assistive/communication devices  Outcome: Progressing

## 2022-04-26 NOTE — PHYSICAL THERAPY NOTE
Physical therapy consult requested. Pt is a 79 y/o male who presented to the ED following an unwitnessed fall at home, striking his head. Imaging revealed interhemispheric SDH and R frontal SAH. Pt noted to be hypoxic upon exam, w/u revealed pt COVID+ with saddle PE. Heparin drip initiated. Will hold PT evaluation for 24 hours from heparin initiation to optimize anticoagulation prior to mobility.

## 2022-04-26 NOTE — PROGRESS NOTES
04/26/22 1633   Clinical Encounter Type   Visited With Health care provider  (Pt is not decisional.  POLST requested but no POA named (Living Will signed). Wife (closest relative) will return TBD.   RN will page [phone] when wife returns to act as POA)   Routine Visit Introduction  (POLST requested)   Continue Visiting Yes   Referral From    Referral To Nurse  (Please page [phone] when wife returns so that POLST can be completed, as requested.)

## 2022-04-26 NOTE — PLAN OF CARE
Received patient this afternoon from ED. A+o to self. Knows month/date of his birthday but unable to tell me year. states he is 89. Moaning with turning/repositioning noted. Tylenol given per orders. Speech at bedside. Passed swallow. Wife states he eats soft foods only. Chronic belcher. Bag changed from leg bag. Neuro checks q1h at this time. See assessment for complete details. Vss. Within bp parameters given. Will continue to monitor.

## 2022-04-26 NOTE — PLAN OF CARE
Assumed patient care at 0730. Pt lethergic in the beginning of shift. Pt is more alert and cooperative during the shift, with intermittent episodes of drowsiness. Opens eyes when spoken to and follows simple commands, generalized weakness throughout. Alert to self and birthday, disorientated to place, time, and situation. Lung sounds are clear, diminished. Weaning down oxygen, currently on 2L NC. NSR with frequent PACs on monitor. Pt on heparin gtt infusing per ACS protocol. Chronic belcher catheter intact. Pt reported pain, prn Tylenol was given. CTU orders. Problem: CARDIOVASCULAR - ADULT  Goal: Maintains optimal cardiac output and hemodynamic stability  Description: INTERVENTIONS:  - Monitor vital signs, rhythm, and trends  - Monitor for bleeding, hypotension and signs of decreased cardiac output  - Evaluate effectiveness of vasoactive medications to optimize hemodynamic stability  - Monitor arterial and/or venous puncture sites for bleeding and/or hematoma  - Assess quality of pulses, skin color and temperature  - Assess for signs of decreased coronary artery perfusion - ex.  Angina  - Evaluate fluid balance, assess for edema, trend weights  Outcome: Progressing  Goal: Absence of cardiac arrhythmias or at baseline  Description: INTERVENTIONS:  - Continuous cardiac monitoring, monitor vital signs, obtain 12 lead EKG if indicated  - Evaluate effectiveness of antiarrhythmic and heart rate control medications as ordered  - Initiate emergency measures for life threatening arrhythmias  - Monitor electrolytes and administer replacement therapy as ordered  Outcome: Progressing     Problem: SKIN/TISSUE INTEGRITY - ADULT  Goal: Skin integrity remains intact  Description: INTERVENTIONS  - Assess and document risk factors for pressure ulcer development  - Assess and document skin integrity  - Monitor for areas of redness and/or skin breakdown  - Initiate interventions, skin care algorithm/standards of care as needed  Outcome: Progressing     Problem: NEUROLOGICAL - ADULT  Goal: Achieves stable or improved neurological status  Description: INTERVENTIONS  - Assess for and report changes in neurological status  - Initiate measures to prevent increased intracranial pressure  - Maintain blood pressure and fluid volume within ordered parameters to optimize cerebral perfusion and minimize risk of hemorrhage  - Monitor temperature, glucose, and sodium.  Initiate appropriate interventions as ordered  Outcome: Progressing

## 2022-04-26 NOTE — OCCUPATIONAL THERAPY NOTE
Attempted to see patient for OT eval, however patient started on Heparin gtt over noc for PE and LLE DVT.   Will hold 24 hours per department protocol, resuming services as clinically appropriate

## 2022-04-27 ENCOUNTER — APPOINTMENT (OUTPATIENT)
Dept: GENERAL RADIOLOGY | Facility: HOSPITAL | Age: 87
End: 2022-04-27
Attending: INTERNAL MEDICINE
Payer: MEDICARE

## 2022-04-27 LAB
ANION GAP SERPL CALC-SCNC: 6 MMOL/L (ref 0–18)
APTT PPP: 36.6 SECONDS (ref 23.3–35.6)
APTT PPP: 48.5 SECONDS (ref 23.3–35.6)
APTT PPP: 55.6 SECONDS (ref 23.3–35.6)
BUN BLD-MCNC: 18 MG/DL (ref 7–18)
CALCIUM BLD-MCNC: 9.1 MG/DL (ref 8.5–10.1)
CHLORIDE SERPL-SCNC: 105 MMOL/L (ref 98–112)
CO2 SERPL-SCNC: 26 MMOL/L (ref 21–32)
CREAT BLD-MCNC: 0.72 MG/DL
CRP SERPL-MCNC: 13.3 MG/DL (ref ?–0.3)
ERYTHROCYTE [DISTWIDTH] IN BLOOD BY AUTOMATED COUNT: 13.4 %
GLUCOSE BLD-MCNC: 105 MG/DL (ref 70–99)
HCT VFR BLD AUTO: 38.6 %
HGB BLD-MCNC: 13 G/DL
MCH RBC QN AUTO: 32.2 PG (ref 26–34)
MCHC RBC AUTO-ENTMCNC: 33.7 G/DL (ref 31–37)
MCV RBC AUTO: 95.5 FL
OSMOLALITY SERPL CALC.SUM OF ELEC: 286 MOSM/KG (ref 275–295)
PLATELET # BLD AUTO: 145 10(3)UL (ref 150–450)
POTASSIUM SERPL-SCNC: 4.1 MMOL/L (ref 3.5–5.1)
POTASSIUM SERPL-SCNC: 4.2 MMOL/L (ref 3.5–5.1)
RBC # BLD AUTO: 4.04 X10(6)UL
SODIUM SERPL-SCNC: 137 MMOL/L (ref 136–145)
WBC # BLD AUTO: 7.8 X10(3) UL (ref 4–11)

## 2022-04-27 PROCEDURE — 99233 SBSQ HOSP IP/OBS HIGH 50: CPT | Performed by: HOSPITALIST

## 2022-04-27 PROCEDURE — 71045 X-RAY EXAM CHEST 1 VIEW: CPT | Performed by: INTERNAL MEDICINE

## 2022-04-27 NOTE — PROGRESS NOTES
04/27/22 1127   Clinical Encounter Type   Visited With Patient and family together   Continue Visiting No   Crisis Visit Critical care   Referral From Family   Referral To    Congregational Encounters   Spiritual Requests During Visit / Hospitalization Confucianist Communion   Sacramental Encounters   Communion Given Indicator Yes   Taxonomy   Interventions Discuss plan of care;Assist someone with Advance Directives;Silent prayer; Facilitate advance care planning    completed a POLST form with patient's Breanna Steward, his wife. POLST form put in paper chart, awaiting APN signature. Upon obtaining signature, please give original POLST form to patient's wife, and put a copy in the paper chart. Alerting chaplains via pager [phone] or consult (they will scan into EMR). Spiritual care available also via pager [phone] or consult.

## 2022-04-27 NOTE — PLAN OF CARE
Oriented to self and birthday, very [place]. See neuro FS for rest of assessment. Heparin gtt turned off ~noon. PO Eliquis given. Antibiotics d/c'd by ID. NSR on monitor with frequent ectopy. Up to chair with therapy today, given Tylenol for c/o back pain. Ate most of breakfast, but refused lunch. Tele orders to 7th floor, report given to Alvarez Perez RN. No other issues at this time.  Family updated on POC and transfer

## 2022-04-27 NOTE — PLAN OF CARE
Assumed care of patient at approximately 1930 resting in bed. Pt complains of all over achiness but refuses pain medication. Pt is oriented x1, which is his baseline due to dementia and Alzheimer's disease. Remainder of neuro exam intact. Pt denies SOB on 2L NC. Vital signs stable. Chronic belcher in place. See flowsheet for further details. Problem: PAIN - ADULT  Goal: Verbalizes/displays adequate comfort level or patient's stated pain goal  Description: INTERVENTIONS:  - Encourage pt to monitor pain and request assistance  - Assess pain using appropriate pain scale  - Administer analgesics based on type and severity of pain and evaluate response  - Implement non-pharmacological measures as appropriate and evaluate response  - Consider cultural and social influences on pain and pain management  - Manage/alleviate anxiety  - Utilize distraction and/or relaxation techniques  - Monitor for opioid side effects  - Notify MD/LIP if interventions unsuccessful or patient reports new pain  - Anticipate increased pain with activity and pre-medicate as appropriate  Outcome: Progressing     Problem: SAFETY ADULT - FALL  Goal: Free from fall injury  Description: INTERVENTIONS:  - Assess pt frequently for physical needs  - Identify cognitive and physical deficits and behaviors that affect risk of falls.   - [place]ias fall precautions as indicated by assessment.  - Educate pt/family on patient safety including physical limitations  - Instruct pt to call for assistance with activity based on assessment  - Modify environment to reduce risk of injury  - Provide assistive devices as appropriate  - Consider OT/PT consult to assist with strengthening/mobility  - Encourage toileting schedule  Outcome: Progressing     Problem: CARDIOVASCULAR - ADULT  Goal: Maintains optimal cardiac output and hemodynamic stability  Description: INTERVENTIONS:  - Monitor vital signs, rhythm, and trends  - Monitor for bleeding, hypotension and signs of decreased cardiac output  - Evaluate effectiveness of vasoactive medications to optimize hemodynamic stability  - Monitor arterial and/or venous puncture sites for bleeding and/or hematoma  - Assess quality of pulses, skin color and temperature  - Assess for signs of decreased coronary artery perfusion - ex.  Angina  - Evaluate fluid balance, assess for edema, trend weights  Outcome: Progressing  Goal: Absence of cardiac arrhythmias or at baseline  Description: INTERVENTIONS:  - Continuous cardiac monitoring, monitor vital signs, obtain 12 lead EKG if indicated  - Evaluate effectiveness of antiarrhythmic and heart rate control medications as ordered  - Initiate emergency measures for life threatening arrhythmias  - Monitor electrolytes and administer replacement therapy as ordered  Outcome: Progressing     Problem: RESPIRATORY - ADULT  Goal: Achieves optimal ventilation and oxygenation  Description: INTERVENTIONS:  - Assess for changes in respiratory status  - Assess for changes in mentation and behavior  - Position to facilitate oxygenation and minimize respiratory effort  - Oxygen supplementation based on oxygen saturation or ABGs  - Provide Smoking Cessation handout, if applicable  - Encourage broncho-pulmonary hygiene including cough, deep breathe, Incentive Spirometry  - Assess the need for suctioning and perform as needed  - Assess and instruct to report SOB or any respiratory difficulty  - Respiratory Therapy support as indicated  - Manage/alleviate anxiety  - Monitor for signs/symptoms of CO2 retention  Outcome: Progressing     Problem: SKIN/TISSUE INTEGRITY - ADULT  Goal: Skin integrity remains intact  Description: INTERVENTIONS  - Assess and document risk factors for pressure ulcer development  - Assess and document skin integrity  - Monitor for areas of redness and/or skin breakdown  - Initiate interventions, skin care algorithm/standards of care as needed  Outcome: Progressing  Goal: Incision(s), wounds(s) or drain site(s) healing without S/S of infection  Description: INTERVENTIONS:  - Assess and document risk factors for pressure ulcer development  - Assess and document skin integrity  - Assess and document dressing/incision, wound bed, drain sites and surrounding tissue  - Implement wound care per orders  - Initiate isolation precautions as appropriate  - Initiate Pressure Ulcer prevention bundle as indicated  Outcome: Progressing     Problem: NEUROLOGICAL - ADULT  Goal: Achieves stable or improved neurological status  Description: INTERVENTIONS  - Assess for and report changes in neurological status  - Initiate measures to prevent increased intracranial pressure  - Maintain blood pressure and fluid volume within ordered parameters to optimize cerebral perfusion and minimize risk of hemorrhage  - Monitor temperature, glucose, and sodium.  Initiate appropriate interventions as ordered  Outcome: Progressing  Goal: Absence of seizures  Description: INTERVENTIONS  - Monitor for seizure activity  - Administer anti-seizure medications as ordered  - Monitor neurological status  Outcome: Progressing

## 2022-04-27 NOTE — PROGRESS NOTES
[facility]  Neurosurgery Progress Note    [facility] Patient Status:  Inpatient    1926 MRN [id number]   [facility] 6NE-A Attending Kang Haines MD   Hosp Day # 2 PCP Edgar Damon MD     PRINCIPLE PROBLEM     Syncope, acute traumatic SDH    SUBJECTIVE     Physical exam deferred at this time d/t COVID isolation. Pt noted to be sleeping. Per nursing, pt is at baseline mentation with intact neurologic exam. No acute events overnight. OBJECTIVE/PHYSICAL EXAM     Deferred     LABS     Lab Results   Component Value Date    WBC 7.8 2022    HGB 13.0 2022    HCT 38.6 2022    .0 2022    CREATSERUM 0.72 2022    BUN 18 2022     2022    K 4.2 2022     2022    CO2 26.0 2022     2022    CA 9.1 2022    PTT 55.6 2022    CRP 13.30 2022       IMAGING     XR THORACIC SPINE (3 VIEWS) (CPT=72072)    Result Date: 2022  CONCLUSION:  1. No acute fractures are identified. If clinical concern for fracture is high, consider MRI as irregularity identified on prior CT is not well visualized on this examination. 2. Degenerative changes. Dictated by (CST): Juancarlos Rowland MD on 2022 at 6:33 PM     Finalized by (CST): Juancarlos Rowland MD on 2022 at 6:35 PM       CT BRAIN OR HEAD (82122)    Result Date: 2022  CONCLUSION:  Stable subarachnoid hemorrhage in the high right parietal convexity and left occipital lobe sulci. No significant change in acute subdural hematoma along the midline falx measuring up to 7 mm in maximum thickness. No midline shift. No significant mass effect.   Diffuse cerebral and cerebellar atrophy with chronic microvascular ischemic changes of aging   Dictated by (CST): Kaci Nation MD on 2022 at 6:17 PM     Finalized by (CST): Kaci Nation MD on 2022 at 6:23 PM       ASSESSMENT & PLAN     ASSESSMENT:   80year old male hospitalized for  1. Syncope  2. Acute traumatic SDH   3. Acute saddle pulmonary embolism  4. Positive COVID-19    PLAN:  Will discuss with Dr. Valentine Greenberg  Reviewed XR thoracic - no fracture seen  Defer treatment of PE to medical team  PT/OT evals when appropriate   Medical management per hospitalist  Neurosurgery will sign off    A total of 10 minutes of visit time (exclusible of billable procedures) was administered. >50 % of time spent counseling/coordinating care. Is this a shared or split note between [address] Provider and Physician? No      Fela Rausch, MSN, APRN, FNP-[facility]  4/27/2022, 10:51 AM

## 2022-04-27 NOTE — PROGRESS NOTES
04/26/22 1948   Clinical Encounter Type   Visited With Health care provider  (POA completed and entered in the chart but no one designated. .  Wife reports she has the complete form at home and will bring in 4/27)   Routine Visit Follow-up  (POLST requested but no POA designated on file. Chart indicated DNAR/full treatmnet. RN will discuss changing the code, as needed. POLST form left in the chart to be completed when code status is clarified and POA is identified)   Continue Visiting Yes  (RN will page 2000 when wife returns with the POA document designating her)   Referral From    Referral To Nurse;Physician  (Please page 2000 when the wife returns with the [address] [place] [address] paperwork.   Also please clarify with the MD the pt's code and treatment.)

## 2022-04-27 NOTE — PROGRESS NOTES
04/26/22 2011   Clinical Encounter Type   Visited With Family  (Wife at bedside with COVID pt, requesting annointing in the morning 2/27)   Referral From    Referral To Nurse  (Jaky Cunningham will be coming 4/27 to annoint pt, as per wife's request.  Please page 2000 to notify the  so that when pt is annointed it can be entered into the chart)   [address] of Sick-Anointing Patient requested anointing  (PILAR pt, Jaky Cunningham will annoint pt 4/27 (as requested by assigned  Fr. Mariana De Jesus who is sick). )

## 2022-04-28 ENCOUNTER — APPOINTMENT (OUTPATIENT)
Dept: CT IMAGING | Facility: HOSPITAL | Age: 87
End: 2022-04-28
Payer: MEDICARE

## 2022-04-28 PROCEDURE — 70450 CT HEAD/BRAIN W/O DYE: CPT

## 2022-04-28 PROCEDURE — 99232 SBSQ HOSP IP/OBS MODERATE 35: CPT | Performed by: HOSPITALIST

## 2022-04-28 NOTE — PLAN OF CARE
Assumed care of patient at 0730, tele on and hr in the 60's at this time. Denies sob or chest pain. On 2l n/c, unable to wean at this time. Yeager in place with clear yellow urine. Turn q 2 hrs and patient has been up to chair today. Tolerating diet without difficulty. Wife at bedside. Call light in reach, bed alarm in place, will continue to monitor.        Problem: PAIN - ADULT  Goal: Verbalizes/displays adequate comfort level or patient's stated pain goal  Description: INTERVENTIONS:  - Encourage pt to monitor pain and request assistance  - Assess pain using appropriate pain scale  - Administer analgesics based on type and severity of pain and evaluate response  - Implement non-pharmacological measures as appropriate and evaluate response  - Consider cultural and social influences on pain and pain management  - Manage/alleviate anxiety  - Utilize distraction and/or relaxation techniques  - Monitor for opioid side effects  - Notify MD/LIP if interventions unsuccessful or patient reports new pain  - Anticipate increased pain with activity and pre-medicate as appropriate  Outcome: Progressing     Problem: RESPIRATORY - ADULT  Goal: Achieves optimal ventilation and oxygenation  Description: INTERVENTIONS:  - Assess for changes in respiratory status  - Assess for changes in mentation and behavior  - Position to facilitate oxygenation and minimize respiratory effort  - Oxygen supplementation based on oxygen saturation or ABGs  - Provide Smoking Cessation handout, if applicable  - Encourage broncho-pulmonary hygiene including cough, deep breathe, Incentive Spirometry  - Assess the need for suctioning and perform as needed  - Assess and instruct to report SOB or any respiratory difficulty  - Respiratory Therapy support as indicated  - Manage/alleviate anxiety  - Monitor for signs/symptoms of CO2 retention  Outcome: Progressing

## 2022-04-28 NOTE — PLAN OF CARE
Assumed care at [facility].    A&Ox1, 2L NC, NSR on tele  Q4 Neuro, no new deficits   Voiding per belcher   Elevated bp, prn labetalol given   Denying pain overnight   Call light within reach    Patient updated on plan of care

## 2022-04-28 NOTE — PLAN OF CARE
o2 evaluation,   Attempted to wean oxygen this am. o2 decreased to 1l n/c, patient o2 sats with 1l at rest were 84-86 %. Oxygen returned to 2l n/c and sats moved back to 2l n/c and o2 sats  93-96%. Will attempt to wean again later today.

## 2022-04-28 NOTE — CM/SW NOTE
04/28/22 1500   CM/SW Referral Data   Referral Source Social Work (self-referral); Physician   Reason for Referral Discharge planning   Informant Patient;Daughter   Patient Info   Patient's Current Mental Status at Time of Assessment Alert;Memory Impairments   Patient lives with Spouse/Significant other;Daughter   Patient Status Prior to Admission   Services in place prior to admission DME/Supplies at home   Type of DME/Supplies Aniyah Hurt; Wheelchair   Choice of Post-Acute Provider   Informed patient of right to choose their preferred provider Yes       MSW spoke to pt's wife and she wants her spouse to come home, Spouse agreeable for MSW to call pt's dtr. MSW spoke to pt's Dtr who states plan is for pt to come home with family (24 hour care). Pt's Dtr and law and grandson live in the home as well. Dtr states pt is a 2 person assist at home. Dtr agreeable to home health. MSW made a referral for Pj Givens Rn, PT.    Pj Givens pending.   Follow for home 02-on here none at home

## 2022-04-28 NOTE — PROGRESS NOTES
Late entry:      Discussed with Dr. Tapan Durbin on rounds. No further neurosurgical recommendations.   Neurosurgery will sign off at this time

## 2022-04-29 VITALS
DIASTOLIC BLOOD PRESSURE: 63 MMHG | TEMPERATURE: 98 F | BODY MASS INDEX: 22 KG/M2 | HEART RATE: 74 BPM | OXYGEN SATURATION: 92 % | WEIGHT: 130.75 LBS | RESPIRATION RATE: 19 BRPM | SYSTOLIC BLOOD PRESSURE: 105 MMHG

## 2022-04-29 LAB
CRP SERPL-MCNC: 12.7 MG/DL (ref ?–0.3)
CRP SERPL-MCNC: 7.39 MG/DL (ref ?–0.3)

## 2022-04-29 PROCEDURE — 99239 HOSP IP/OBS DSCHRG MGMT >30: CPT | Performed by: HOSPITALIST

## 2022-04-29 NOTE — PLAN OF CARE
Assumed patient care 0730  Patient alert, oriented to self, wife at bedside  On 1-5L NC to maintain O2 saturation greater than 90% at rest and with activity  VSS,NSR on tele  2 max assist to turn and reposition in bed  Wife assists with feeding, fair appetite  Voiding adequate amount of urine per chronic belcher catheter, no BM this shift  Eliquis delivered to patient room   Patient cleared to discharge from all standpoints         NURSING DISCHARGE NOTE    Discharge instructions, AVS, and discharge paperwork given to ambulance transport team.  Discharge instructions reviewed with spouse at bedside, verbalized understanding. Discharged Home via Ambulance. Accompanied by Spouse and Support staff  Belongings Taken by patient/family. Problem: NEUROLOGICAL - ADULT  Goal: Achieves stable or improved neurological status  Description: INTERVENTIONS  - Assess for and report changes in neurological status  - Initiate measures to prevent increased intracranial pressure  - Maintain blood pressure and fluid volume within ordered parameters to optimize cerebral perfusion and minimize risk of hemorrhage  - Monitor temperature, glucose, and sodium.  Initiate appropriate interventions as ordered  Outcome: Adequate for Discharge  Goal: Absence of seizures  Description: INTERVENTIONS  - Monitor for seizure activity  - Administer anti-seizure medications as ordered  - Monitor neurological status  Outcome: Adequate for Discharge  Goal: Remains free of injury related to seizure activity  Description: INTERVENTIONS:  - Maintain airway, patient safety  and administer oxygen as ordered  - Monitor patient for seizure activity, document and report duration and description of seizure to MD/LIP  - If seizure occurs, turn patient to side and suction secretions as needed  - Reorient patient post seizure  - Seizure pads on all 4 side rails  - Instruct patient/family to notify RN of any seizure activity  - Instruct patient/family to call for assistance with activity based on assessment  Outcome: Adequate for Discharge  Goal: Achieves maximal functionality and self care  Description: INTERVENTIONS  - Monitor swallowing and airway patency with patient fatigue and changes in neurological status  - Encourage and assist patient to increase activity and self care with guidance from PT/OT  - Encourage visually impaired, hearing impaired and aphasic patients to use assistive/communication devices  Outcome: Adequate for Discharge

## 2022-04-29 NOTE — PLAN OF CARE
Assumed care 1900  Patient alert, oriented to self, family at bedside  Repositioned  Unable to wean off 2 L nasal cannula  daughter updated

## 2022-04-29 NOTE — PROGRESS NOTES
04/29/22 1000   Mobility   O2 walk?  Yes   SPO2% on Room Air at Rest 86   SPO2% on Oxygen at Rest 95   At rest oxygen flow (liters per minute) 2   SPO2% Ambulation on Room Air 85   SPO2% Ambulation on Oxygen 92   Ambulation oxygen flow (liters per minute) 5

## 2022-04-29 NOTE — CM/SW NOTE
JOHN notified RT of [employer] o2 company. Asked to dispense a portable tank to pt. SW put instructions on dc paperwork for family to contact [facility] at [phone] to let them know they have left and to coordinate delivery. Signed orders sent to [facility] for hospital bed and elli lift. JOHN met with pt and wife to discuss above. Ruby Hathaway made aware of dc today and pt's needs. SW left message with [facility] to get estimated delivery time for DME and then will coordinate transport. RN aware.     Macie 106, LSW

## 2022-04-29 NOTE — CM/SW NOTE
Pt discussed in rounds, anticipated to dc today. SW checked [place] referral, [facility] only accepting agency. SW reserved and confirmed anticipated dc. Pt currently needing 2L of 02, SW started DME referral. RN to complete 02 walk. SW left vm to dtr to discuss dc planning.      Macie Landeros, LSW

## 2022-04-29 NOTE — CM/SW NOTE
HME will be able to deliver hospital bed and elli lift tomorrow morning. SW notified wife, wife still wanting dc today. RN notified. SW set up ambulance for 4:30 transport, PCS completed. HME notified of dc plans.      Macie Landeros, LSW

## 2022-04-29 NOTE — CM/SW NOTE
SW received message from PT- pt needing hospital bed/lift equipment at home. SW sent referral in DME referral. [facility] in network and able to accept. Patient requires a semi-electric hospital bed at home due to physical condition in which they are not able to turn self. Positioning in an ordinary bed will be insufficient and requires frequent and/or immediate changes in body positions. Length of need: 99 months   Height: 5'5\" Weight: 130 lbs   NPI: [id number]       Patient requires Lift Equipment as he is a 2 person assist and is unable to perform ADLs without assistance.  Length of need: 99 months Height: 5'5\" Weight: 130 lbs NPI: [id number]

## 2022-04-30 NOTE — PAYOR COMM NOTE
Discharge Notification    Patient Name: Milak Montes  Payor: Carlos Renner MA O  Subscriber #: [id number]  Authorization Number: [id number]  Admit Date/Time: 4/25/2022 8:47 AM  Discharge Date/Time: 4/29/2022 5:17 PM

## 2022-05-02 ENCOUNTER — TELEPHONE (OUTPATIENT)
Dept: INTERNAL MEDICINE CLINIC | Facility: CLINIC | Age: 87
End: 2022-05-02

## 2022-05-02 ENCOUNTER — PATIENT OUTREACH (OUTPATIENT)
Dept: CASE MANAGEMENT | Age: 87
End: 2022-05-02

## 2022-05-02 DIAGNOSIS — Z02.9 ENCOUNTERS FOR UNSPECIFIED ADMINISTRATIVE PURPOSE: ICD-10-CM

## 2022-05-02 PROCEDURE — 1111F DSCHRG MED/CURRENT MED MERGE: CPT

## 2022-05-02 NOTE — TELEPHONE ENCOUNTER
[address] [facility], she does not think she can get an RN to pt's tomorrow afternoon, but will try for one of the morning time slots on Thursday 5/5.      [facility] today to confirm time for call with AVILA

## 2022-05-02 NOTE — TELEPHONE ENCOUNTER
Really have not seen the patient and is very difficult for me to give any orders.   This is a very complicated patient

## 2022-05-02 NOTE — TELEPHONE ENCOUNTER
Víctor Em with [facility] called wanting to know if Wilfrid Soriano will sign  orders for the pt. She states the pt was at the hospital and referred to get [facility] services. Víctor Em saw the pt yesterday and states he eats little, he has trouble communicating properly and mobility. She states the pt was unable to get out of bed and he could not roll over on the bed. Víctor Em is concerned that the pt may need more than just [facility] as well. She is requesting VO for nursing, pt, and a . Please advise.       Ph: [phone]

## 2022-05-02 NOTE — TELEPHONE ENCOUNTER
Appointment scheduled with Ana Lewis   Future Appointments   Date Time Provider Betty Gomez   5/5/2022 11:30 AM Joaquim Del Cid MD [facility] [place]     GALE Syed XQ:[phone] to be present during visit.

## 2022-05-02 NOTE — TELEPHONE ENCOUNTER
This is a very complicated patient and something that cannot be taken care of without having some form of contact with the patient  And having to review the records. This is an ongoing policy problems where we have have to manage patients  discharged from the hospital without having any established contact with them or having a chance to review the records. Please set the patient up for a telephone visit while the home health care nurse is present so that I can have a meaningful conversation and give further orders.   I  Need to feel comfortable signing orders    I am forwarding this to Dr. Savanah Miramontes to express my concern

## 2022-05-02 NOTE — PROGRESS NOTES
Attempted to contact pt for TCM however no answer. Call continued to ring, sounded as though it answered but no response then disconnected. NCM to try again at a later time.

## 2022-05-03 ENCOUNTER — PATIENT OUTREACH (OUTPATIENT)
Dept: CASE MANAGEMENT | Age: 87
End: 2022-05-03

## 2022-05-03 NOTE — PROGRESS NOTES
VM received; pt wife, Wale Gonzales requesting assistance w/scheduling apt (dc 04/29)    Dr Michael Echeverria  [address] [phone]  Telehealth visit  Follow up 2 weeks    Dr Timo Mabry  [facility] [address] [phone]  Follow up 3 days  LVM if still need assistance to call [phone]  Closing encounter

## 2022-05-03 NOTE — PROGRESS NOTES
Pts spouse Casey Beaver called left  on TST line requesting assistance with scheduling HFU appts. Please call spouse Gogo back.

## 2022-05-04 ENCOUNTER — TELEPHONE (OUTPATIENT)
Dept: INTERNAL MEDICINE CLINIC | Facility: CLINIC | Age: 87
End: 2022-05-04

## 2022-05-04 NOTE — TELEPHONE ENCOUNTER
Sabine Lane,  from [address] called to give dr. Palma Coburn update/FYI pt's wife spoke to Sabine Lane in regards to hospice vs. Palliative care. Will most likely discuss this during tomorrow's phone visit. Best call back number for Sabine Lane is [phone] extension [phone].

## 2022-05-05 PROBLEM — I60.9 SUBARACHNOID HEMORRHAGE (HCC): Status: ACTIVE | Noted: 2022-05-05

## 2022-05-10 ENCOUNTER — VIRTUAL PHONE E/M (OUTPATIENT)
Dept: INTERNAL MEDICINE CLINIC | Facility: CLINIC | Age: 87
End: 2022-05-10
Payer: MEDICARE

## 2022-05-10 DIAGNOSIS — I60.9 SUBARACHNOID HEMORRHAGE (HCC): ICD-10-CM

## 2022-05-10 DIAGNOSIS — D64.9 ANEMIA, UNSPECIFIED TYPE: ICD-10-CM

## 2022-05-10 DIAGNOSIS — I26.92 ACUTE SADDLE PULMONARY EMBOLISM, UNSPECIFIED WHETHER ACUTE COR PULMONALE PRESENT (HCC): ICD-10-CM

## 2022-05-10 DIAGNOSIS — S06.5X9A SDH (SUBDURAL HEMATOMA) (HCC): Primary | ICD-10-CM

## 2022-05-10 DIAGNOSIS — U07.1 COVID: ICD-10-CM

## 2022-05-10 PROCEDURE — 1111F DSCHRG MED/CURRENT MED MERGE: CPT | Performed by: INTERNAL MEDICINE

## 2022-05-10 PROCEDURE — 99443 PHONE E/M BY PHYS 21-30 MIN: CPT | Performed by: INTERNAL MEDICINE

## 2022-05-10 NOTE — PROGRESS NOTES
Virtual Telephone Check-In    Kim Ink verbally consents to a Virtual/Telephone Check-In visit on 05/10/22. Patient has been referred to the [facility] website at [email or web address] to review the yearly Consent to Treat document. Patient understands and accepts financial responsibility for any deductible, co-insurance and/or co-pays associated with this service. Duration of the service: 25 minutes      Summary of topics discussed: Rather complicated patient who missed his earlier a telephone appointment a week ago currently is at home with the help of his caretaker/wife. The patient seems to be fairly alert and oriented at this time has not followed up with his pulmonary MD and or the neurosurgeon. No breathing issues still is pretty much dependent on wheelchair. Denies any other respiratory cardiac GI complaints. All records were reviewed. Patient's caretaker was informed that this is very important that they follow-up with the consults as listed below also diagnostic work-up would not will be needed. They had questions about hospice however they feel that they are not ready for it at the moment. Diagnoses and all orders for this visit:    SDH (subdural hematoma) ([facility] [address]  -     NEUROSURGERY - INTERNAL    Acute saddle pulmonary embolism, unspecified whether acute cor pulmonale present (HCC)  -     PULMONARY - INTERNAL    Anemia, unspecified type  -     CBC WITH DIFFERENTIAL WITH PLATELET; Future  -     COMP METABOLIC PANEL (14); Future    Subarachnoid hemorrhage (HCC)  -     NEUROSURGERY - INTERNAL    COVID  -     PULMONARY - INTERNAL  -     XR CHEST PA + LAT CHEST (CPT=71046); Future      Abdomen less med sheet discharge notes were all reviewed. And updated.     Yuri No MD

## 2022-05-11 ENCOUNTER — TELEPHONE (OUTPATIENT)
Dept: INTERNAL MEDICINE CLINIC | Facility: CLINIC | Age: 87
End: 2022-05-11

## 2022-05-11 NOTE — TELEPHONE ENCOUNTER
Pt wife Anne Sim called office to get phone numbers for pulmonologist and neurosurgeon office. Provided numbers from pt hospital discharge information.

## 2022-05-12 ENCOUNTER — TELEPHONE (OUTPATIENT)
Dept: INTERNAL MEDICINE CLINIC | Facility: CLINIC | Age: 87
End: 2022-05-12

## 2022-05-12 NOTE — TELEPHONE ENCOUNTER
Ludy Goodwin with [facility] calling to inform MD pt was not seen today due to being asleep, wife wanted pt to continue sleeping. [facility] services to resume next week.      [address]: [phone]

## 2022-05-12 NOTE — TELEPHONE ENCOUNTER
Pt's wife Roma Zaldivar called and stated she contacted pulmonologist and was told this provider is no longer taking patients. She also stated she has been trying to contact neoo and cannot get through. Wife requesting other recommended providers. Please advise.

## 2022-05-12 NOTE — TELEPHONE ENCOUNTER
Referral pended for pulmonary, please review and sign if appropriate. Thank you! MD Muriel Shone. [facility]   Pulmonary Disease   PATIENTS' [facility] [place]     Neurosurgery referral still in open status.

## 2022-05-17 NOTE — PROGRESS NOTES
Multiple attempts to reach pt and messages left with no return call. Patient went in for HFU appt with PCP on 5/10/22. Encounter closing.

## 2022-05-19 ENCOUNTER — TELEPHONE (OUTPATIENT)
Dept: INTERNAL MEDICINE CLINIC | Facility: CLINIC | Age: 87
End: 2022-05-19

## 2022-05-19 NOTE — TELEPHONE ENCOUNTER
Received message from referral department that neurosurgery referral to [facility] was denied by insurance as OON. Called and spoke to pt's wife Archie Tianna. Wife gave the OK to change to another provider in an effort to get this referral approved. Sent the information to referral department to change provider to:    [address]  [place], [address]    Phone [phone]    Informed wife Archie Wynn that we will monitor referral and notify her when authorized.

## 2022-05-20 NOTE — TELEPHONE ENCOUNTER
Monitoring for Neurosurgery referral authorization    Status is still closed, waiting on update from referral department

## 2022-05-26 NOTE — TELEPHONE ENCOUNTER
Incoming f/u call  [facility] requesting to extend home health orders to 1x week for 4 weeks since she was able to get the pt to walk yesterday.      Please advise  [phone]

## 2022-05-31 NOTE — TELEPHONE ENCOUNTER
[phone]    Spoke to Elizabeth at [address] - gave VO to continue 1 x week for 4 more weeks per VM. Elizabeth verbalized understanding with read back.

## 2022-06-01 ENCOUNTER — TELEPHONE (OUTPATIENT)
Dept: INTERNAL MEDICINE CLINIC | Facility: CLINIC | Age: 87
End: 2022-06-01

## 2022-06-01 NOTE — TELEPHONE ENCOUNTER
[facility] calling to request the most recent OV note to be faxed over for \"Coding verification and face to face\"    Fax# [phone]

## 2022-06-02 NOTE — TELEPHONE ENCOUNTER
Faxed over Office notes from the virtual visit as requested.  Confirmation of fax in purple folder pod #2

## 2022-06-04 ENCOUNTER — HOSPITAL ENCOUNTER (OUTPATIENT)
Dept: GENERAL RADIOLOGY | Age: 87
Discharge: HOME | End: 2022-06-04
Attending: INTERNAL MEDICINE
Payer: MEDICARE

## 2022-06-04 DIAGNOSIS — U07.1 COVID: ICD-10-CM

## 2022-06-04 PROCEDURE — 71046 X-RAY EXAM CHEST 2 VIEWS: CPT | Performed by: INTERNAL MEDICINE

## 2022-06-07 ENCOUNTER — TELEPHONE (OUTPATIENT)
Dept: INTERNAL MEDICINE CLINIC | Facility: CLINIC | Age: 87
End: 2022-06-07

## 2022-06-07 NOTE — TELEPHONE ENCOUNTER
Pt spouse requesting refill    apixaban (Eliquis) tab 2.5 mg     [facility] [address] [place] [address] [address] [phone], [phone]    Last prescribed during hospital stay

## 2022-06-07 NOTE — TELEPHONE ENCOUNTER
Incoming fax from [facility] with [address] and plan of care to be reviewed and signed   Kiana Lujan in  in-basket

## 2022-06-07 NOTE — TELEPHONE ENCOUNTER
faxed signed order to [facility]   confirmation received  Sent to scan and copy placed in accordion

## 2022-06-08 ENCOUNTER — TELEPHONE (OUTPATIENT)
Dept: INTERNAL MEDICINE CLINIC | Facility: CLINIC | Age: 87
End: 2022-06-08

## 2022-06-08 NOTE — TELEPHONE ENCOUNTER
Debra Van from [facility] called requesting notes from pts televisit on 5/10, ok to fax ?     Ph: [phone]  Fx: [phone]

## 2022-06-08 NOTE — TELEPHONE ENCOUNTER
Juan Lozano from [address] called stating Trevin Ríos will not sign HH orders because he has not seen pt since 12/13/21, she states they received notes from the televisit on 5/10/22. She would like to make sure Trevin Ríos will not be signing orders for this pt before they send them through. Please advise.

## 2022-06-08 NOTE — TELEPHONE ENCOUNTER
No Protocol     Requesting: apixaban 2.5mg     LOV: 5/10/22   RTC: none noted   Filled: 4/29/22 #60 3 refills   Recent Labs: 4/27/22     Upcoming OV :7/12/22

## 2022-06-09 ENCOUNTER — TELEPHONE (OUTPATIENT)
Dept: INTERNAL MEDICINE CLINIC | Facility: CLINIC | Age: 87
End: 2022-06-09

## 2022-06-09 NOTE — TELEPHONE ENCOUNTER
Faxed signed order to [facility] with receipt of confirmation   Sent to scan and copy placed in [facility]   OXT#[id number]

## 2022-06-15 ENCOUNTER — TELEPHONE (OUTPATIENT)
Dept: INTERNAL MEDICINE CLINIC | Facility: CLINIC | Age: 87
End: 2022-06-15

## 2022-06-15 NOTE — TELEPHONE ENCOUNTER
Elizabeth from [address] called stating pt was discharged form [facility] on 6/8 due to the caregiver being confident pt has improved.      COLLEEN

## 2022-06-20 ENCOUNTER — OFFICE VISIT (OUTPATIENT)
Dept: SURGERY | Facility: CLINIC | Age: 87
End: 2022-06-20
Payer: MEDICARE

## 2022-06-20 VITALS — DIASTOLIC BLOOD PRESSURE: 70 MMHG | HEART RATE: 75 BPM | SYSTOLIC BLOOD PRESSURE: 100 MMHG

## 2022-06-20 DIAGNOSIS — S06.5X9A SDH (SUBDURAL HEMATOMA) (HCC): Primary | ICD-10-CM

## 2022-06-20 DIAGNOSIS — I26.92 ACUTE SADDLE PULMONARY EMBOLISM, UNSPECIFIED WHETHER ACUTE COR PULMONALE PRESENT (HCC): ICD-10-CM

## 2022-06-20 RX ORDER — CIPROFLOXACIN 250 MG/1
250 TABLET, FILM COATED ORAL 2 TIMES DAILY
COMMUNITY
Start: 2022-06-01

## 2022-06-20 NOTE — PROGRESS NOTES
Wife states that he fell on 4.25.22. he feel in the living room and he hit his head. Balance is off and he weak. Memory is off.  Since the ED he is feeling back to normal.

## 2022-07-01 ENCOUNTER — HOSPITAL ENCOUNTER (OUTPATIENT)
Dept: CT IMAGING | Facility: HOSPITAL | Age: 87
Discharge: HOME | End: 2022-07-01
Attending: PHYSICIAN ASSISTANT
Payer: MEDICARE

## 2022-07-01 DIAGNOSIS — S06.5X9A SDH (SUBDURAL HEMATOMA) (HCC): ICD-10-CM

## 2022-07-01 PROCEDURE — 70450 CT HEAD/BRAIN W/O DYE: CPT | Performed by: PHYSICIAN ASSISTANT

## 2022-07-05 ENCOUNTER — TELEPHONE (OUTPATIENT)
Dept: SURGERY | Facility: CLINIC | Age: 87
End: 2022-07-05

## 2022-07-05 NOTE — TELEPHONE ENCOUNTER
Received notification that provider has reviewed imaging. Called patient to inform him of feedback from Patrice Tongma. Left message on voicemail to call back to discuss results. Patient's wife Poonam Bowie called JERE back and feedback was provided. Gogo thanked Nursing for the information and stated that she was very grateful for the good news and the call was ended.

## 2022-07-05 NOTE — TELEPHONE ENCOUNTER
CT Brain reviewed. The blood is gone. He does not need any more imaging of the head and he does not need to see us anymore.

## 2022-07-12 ENCOUNTER — OFFICE VISIT (OUTPATIENT)
Dept: INTERNAL MEDICINE CLINIC | Facility: CLINIC | Age: 87
End: 2022-07-12
Payer: MEDICARE

## 2022-07-12 VITALS
OXYGEN SATURATION: 98 % | RESPIRATION RATE: 16 BRPM | SYSTOLIC BLOOD PRESSURE: 120 MMHG | BODY MASS INDEX: 22 KG/M2 | TEMPERATURE: 98 F | HEART RATE: 49 BPM | WEIGHT: 129.19 LBS | DIASTOLIC BLOOD PRESSURE: 70 MMHG

## 2022-07-12 DIAGNOSIS — G30.9 AD (ALZHEIMER'S DISEASE) (HCC): Chronic | ICD-10-CM

## 2022-07-12 DIAGNOSIS — D69.6 PLATELETS DECREASED (HCC): ICD-10-CM

## 2022-07-12 DIAGNOSIS — N31.9 NEUROGENIC BLADDER: Chronic | ICD-10-CM

## 2022-07-12 DIAGNOSIS — I26.92 ACUTE SADDLE PULMONARY EMBOLISM, UNSPECIFIED WHETHER ACUTE COR PULMONALE PRESENT (HCC): Chronic | ICD-10-CM

## 2022-07-12 DIAGNOSIS — F02.80 AD (ALZHEIMER'S DISEASE) (HCC): Chronic | ICD-10-CM

## 2022-07-12 DIAGNOSIS — N39.0 URINARY TRACT INFECTION WITHOUT HEMATURIA, SITE UNSPECIFIED: Chronic | ICD-10-CM

## 2022-07-12 DIAGNOSIS — E78.00 HYPERCHOLESTEREMIA: Chronic | ICD-10-CM

## 2022-07-12 DIAGNOSIS — G62.9 NEUROPATHY: Chronic | ICD-10-CM

## 2022-07-12 DIAGNOSIS — D64.9 ANEMIA, UNSPECIFIED TYPE: ICD-10-CM

## 2022-07-12 DIAGNOSIS — Z86.718 HISTORY OF DVT OF LOWER EXTREMITY: ICD-10-CM

## 2022-07-12 DIAGNOSIS — R97.20 ELEVATED PSA, LESS THAN 10 NG/ML: Chronic | ICD-10-CM

## 2022-07-12 DIAGNOSIS — Z00.00 ROUTINE GENERAL MEDICAL EXAMINATION AT A HEALTH CARE FACILITY: Primary | ICD-10-CM

## 2022-07-12 PROBLEM — S22.079A: Chronic | Status: ACTIVE | Noted: 2022-04-25
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
## 2022-07-12 PROBLEM — I60.9 SUBARACHNOID HEMORRHAGE (HCC): Status: RESOLVED | Noted: 2022-05-05 | Resolved: 2022-07-12

## 2022-07-12 PROBLEM — S72.009A CLOSED FRACTURE OF HIP (HCC): Chronic | Status: RESOLVED | Noted: 2021-09-29 | Resolved: 2022-07-12

## 2022-07-12 PROCEDURE — 1125F AMNT PAIN NOTED PAIN PRSNT: CPT | Performed by: INTERNAL MEDICINE

## 2022-07-12 PROCEDURE — G0439 PPPS, SUBSEQ VISIT: HCPCS | Performed by: INTERNAL MEDICINE

## 2022-07-12 PROCEDURE — 3078F DIAST BP <80 MM HG: CPT | Performed by: INTERNAL MEDICINE

## 2022-07-12 PROCEDURE — 99397 PER PM REEVAL EST PAT 65+ YR: CPT | Performed by: INTERNAL MEDICINE

## 2022-07-12 PROCEDURE — 3008F BODY MASS INDEX DOCD: CPT | Performed by: INTERNAL MEDICINE

## 2022-07-12 PROCEDURE — 3074F SYST BP LT 130 MM HG: CPT | Performed by: INTERNAL MEDICINE

## 2022-07-12 PROCEDURE — 96160 PT-FOCUSED HLTH RISK ASSMT: CPT | Performed by: INTERNAL MEDICINE

## 2022-07-12 RX ORDER — POLYETHYLENE GLYCOL 3350 17 G/17G
17 POWDER, FOR SOLUTION ORAL
COMMUNITY
Start: 2022-06-28 | End: 2022-07-12

## 2022-07-12 RX ORDER — PSEUDOEPHEDRINE HCL 30 MG
100 TABLET ORAL 2 TIMES DAILY
COMMUNITY
Start: 2022-06-28

## 2022-07-23 ENCOUNTER — HOSPITAL ENCOUNTER (EMERGENCY)
Facility: HOSPITAL | Age: 87
Discharge: HOME | End: 2022-07-23
Attending: EMERGENCY MEDICINE
Payer: MEDICARE

## 2022-07-23 VITALS
HEART RATE: 75 BPM | BODY MASS INDEX: 22 KG/M2 | WEIGHT: 130 LBS | DIASTOLIC BLOOD PRESSURE: 82 MMHG | OXYGEN SATURATION: 95 % | SYSTOLIC BLOOD PRESSURE: 139 MMHG | RESPIRATION RATE: 21 BRPM | TEMPERATURE: 97 F

## 2022-07-23 DIAGNOSIS — T83.9XXA PROBLEM WITH FOLEY CATHETER, INITIAL ENCOUNTER (HCC): Primary | ICD-10-CM

## 2022-07-23 PROCEDURE — 99283 EMERGENCY DEPT VISIT LOW MDM: CPT

## 2022-07-23 PROCEDURE — 51702 INSERT TEMP BLADDER CATH: CPT

## 2022-07-29 ENCOUNTER — OFFICE VISIT (OUTPATIENT)
Dept: PULMONOLOGY | Facility: CLINIC | Age: 87
End: 2022-07-29
Payer: COMMERCIAL

## 2022-07-29 VITALS
BODY MASS INDEX: 21 KG/M2 | HEART RATE: 69 BPM | OXYGEN SATURATION: 96 % | RESPIRATION RATE: 22 BRPM | WEIGHT: 129 LBS | SYSTOLIC BLOOD PRESSURE: 110 MMHG | DIASTOLIC BLOOD PRESSURE: 75 MMHG

## 2022-07-29 DIAGNOSIS — I26.09 OTHER PULMONARY EMBOLISM WITH ACUTE COR PULMONALE, UNSPECIFIED CHRONICITY (HCC): Primary | ICD-10-CM

## 2022-07-29 PROCEDURE — 3078F DIAST BP <80 MM HG: CPT | Performed by: INTERNAL MEDICINE

## 2022-07-29 PROCEDURE — 99244 OFF/OP CNSLTJ NEW/EST MOD 40: CPT | Performed by: INTERNAL MEDICINE

## 2022-07-29 PROCEDURE — 3074F SYST BP LT 130 MM HG: CPT | Performed by: INTERNAL MEDICINE

## 2022-08-02 ENCOUNTER — APPOINTMENT (OUTPATIENT)
Dept: ULTRASOUND IMAGING | Facility: HOSPITAL | Age: 87
End: 2022-08-02
Attending: INTERNAL MEDICINE
Payer: MEDICARE

## 2022-08-02 ENCOUNTER — APPOINTMENT (OUTPATIENT)
Dept: GENERAL RADIOLOGY | Facility: HOSPITAL | Age: 87
End: 2022-08-02
Attending: EMERGENCY MEDICINE
Payer: MEDICARE

## 2022-08-02 ENCOUNTER — HOSPITAL ENCOUNTER (INPATIENT)
Facility: HOSPITAL | Age: 87
LOS: 5 days | Discharge: HOSPICE | End: 2022-08-07
Attending: EMERGENCY MEDICINE | Admitting: HOSPITALIST
Payer: MEDICARE

## 2022-08-02 ENCOUNTER — APPOINTMENT (OUTPATIENT)
Dept: CT IMAGING | Facility: HOSPITAL | Age: 87
End: 2022-08-02
Attending: EMERGENCY MEDICINE
Payer: MEDICARE

## 2022-08-02 DIAGNOSIS — A41.9 SEPSIS WITH ACUTE RENAL FAILURE, DUE TO UNSPECIFIED ORGANISM, UNSPECIFIED ACUTE RENAL FAILURE TYPE, UNSPECIFIED WHETHER SEPTIC SHOCK PRESENT (HCC): Primary | ICD-10-CM

## 2022-08-02 DIAGNOSIS — R65.20 SEPSIS WITH ACUTE RENAL FAILURE, DUE TO UNSPECIFIED ORGANISM, UNSPECIFIED ACUTE RENAL FAILURE TYPE, UNSPECIFIED WHETHER SEPTIC SHOCK PRESENT (HCC): Primary | ICD-10-CM

## 2022-08-02 DIAGNOSIS — N17.9 SEPSIS WITH ACUTE RENAL FAILURE, DUE TO UNSPECIFIED ORGANISM, UNSPECIFIED ACUTE RENAL FAILURE TYPE, UNSPECIFIED WHETHER SEPTIC SHOCK PRESENT (HCC): Primary | ICD-10-CM

## 2022-08-02 PROBLEM — R79.89 AZOTEMIA: Status: ACTIVE | Noted: 2022-08-02

## 2022-08-02 PROBLEM — E87.2 METABOLIC ACIDOSIS: Status: ACTIVE | Noted: 2022-08-02

## 2022-08-02 PROBLEM — E87.20 METABOLIC ACIDOSIS: Status: ACTIVE | Noted: 2022-08-02

## 2022-08-02 PROBLEM — D72.829 LEUKOCYTOSIS: Status: ACTIVE | Noted: 2022-08-02

## 2022-08-02 LAB
ALBUMIN SERPL-MCNC: 2.6 G/DL (ref 3.4–5)
ALBUMIN/GLOB SERPL: 0.6 {RATIO} (ref 1–2)
ALP LIVER SERPL-CCNC: 158 U/L
ALT SERPL-CCNC: 21 U/L
ANION GAP SERPL CALC-SCNC: 17 MMOL/L (ref 0–18)
APTT PPP: 32.8 SECONDS (ref 23.3–35.6)
AST SERPL-CCNC: 14 U/L (ref 15–37)
BASOPHILS # BLD AUTO: 0.08 X10(3) UL (ref 0–0.2)
BASOPHILS NFR BLD AUTO: 0.3 %
BILIRUB SERPL-MCNC: 1.8 MG/DL (ref 0.1–2)
BILIRUB UR QL STRIP.AUTO: NEGATIVE
BUN BLD-MCNC: 51 MG/DL (ref 7–18)
CALCIUM BLD-MCNC: 9.2 MG/DL (ref 8.5–10.1)
CHLORIDE SERPL-SCNC: 107 MMOL/L (ref 98–112)
CO2 SERPL-SCNC: 17 MMOL/L (ref 21–32)
COLOR UR AUTO: YELLOW
CREAT BLD-MCNC: 2.51 MG/DL
EOSINOPHIL # BLD AUTO: 0.08 X10(3) UL (ref 0–0.7)
EOSINOPHIL NFR BLD AUTO: 0.3 %
ERYTHROCYTE [DISTWIDTH] IN BLOOD BY AUTOMATED COUNT: 14.1 %
GFR SERPLBLD BASED ON 1.73 SQ M-ARVRAT: 23 ML/MIN/1.73M2 (ref 60–?)
GLOBULIN PLAS-MCNC: 4.3 G/DL (ref 2.8–4.4)
GLUCOSE BLD-MCNC: 223 MG/DL (ref 70–99)
GLUCOSE UR STRIP.AUTO-MCNC: NEGATIVE MG/DL
HCT VFR BLD AUTO: 49 %
HGB BLD-MCNC: 15.6 G/DL
IMM GRANULOCYTES # BLD AUTO: 0.27 X10(3) UL (ref 0–1)
IMM GRANULOCYTES NFR BLD: 1.1 %
INR BLD: 1.68 (ref 0.85–1.16)
KETONES UR STRIP.AUTO-MCNC: NEGATIVE MG/DL
LACTATE SERPL-SCNC: 10.3 MMOL/L (ref 0.4–2)
LACTATE SERPL-SCNC: 4.4 MMOL/L (ref 0.4–2)
LACTATE SERPL-SCNC: 8.3 MMOL/L (ref 0.4–2)
LYMPHOCYTES # BLD AUTO: 0.54 X10(3) UL (ref 1–4)
LYMPHOCYTES NFR BLD AUTO: 2.3 %
MCH RBC QN AUTO: 32.1 PG (ref 26–34)
MCHC RBC AUTO-ENTMCNC: 31.8 G/DL (ref 31–37)
MCV RBC AUTO: 100.8 FL
MONOCYTES # BLD AUTO: 0.39 X10(3) UL (ref 0.1–1)
MONOCYTES NFR BLD AUTO: 1.6 %
NEUTROPHILS # BLD AUTO: 22.59 X10 (3) UL (ref 1.5–7.7)
NEUTROPHILS # BLD AUTO: 22.59 X10(3) UL (ref 1.5–7.7)
NEUTROPHILS NFR BLD AUTO: 94.4 %
NITRITE UR QL STRIP.AUTO: NEGATIVE
NT-PROBNP SERPL-MCNC: 2380 PG/ML (ref ?–450)
OSMOLALITY SERPL CALC.SUM OF ELEC: 313 MOSM/KG (ref 275–295)
PH UR STRIP.AUTO: 8.5 [PH] (ref 5–8)
PLATELET # BLD AUTO: 289 10(3)UL (ref 150–450)
POTASSIUM SERPL-SCNC: 3.7 MMOL/L (ref 3.5–5.1)
PROT SERPL-MCNC: 6.9 G/DL (ref 6.4–8.2)
PROT UR STRIP.AUTO-MCNC: >=300 MG/DL
PROTHROMBIN TIME: 19.6 SECONDS (ref 11.6–14.8)
RBC # BLD AUTO: 4.86 X10(6)UL
RBC #/AREA URNS AUTO: >10 /HPF
SARS-COV-2 RNA RESP QL NAA+PROBE: NOT DETECTED
SODIUM SERPL-SCNC: 141 MMOL/L (ref 136–145)
SP GR UR STRIP.AUTO: 1.02 (ref 1–1.03)
TROPONIN I HIGH SENSITIVITY: 26 NG/L
UROBILINOGEN UR STRIP.AUTO-MCNC: 0.2 MG/DL
WBC # BLD AUTO: 24 X10(3) UL (ref 4–11)
WBC #/AREA URNS AUTO: >50 /HPF
WBC CLUMPS UR QL AUTO: PRESENT /HPF

## 2022-08-02 PROCEDURE — 99291 CRITICAL CARE FIRST HOUR: CPT | Performed by: INTERNAL MEDICINE

## 2022-08-02 PROCEDURE — 76775 US EXAM ABDO BACK WALL LIM: CPT | Performed by: INTERNAL MEDICINE

## 2022-08-02 PROCEDURE — 71045 X-RAY EXAM CHEST 1 VIEW: CPT | Performed by: EMERGENCY MEDICINE

## 2022-08-02 PROCEDURE — 76770 US EXAM ABDO BACK WALL COMP: CPT | Performed by: INTERNAL MEDICINE

## 2022-08-02 PROCEDURE — 70450 CT HEAD/BRAIN W/O DYE: CPT | Performed by: EMERGENCY MEDICINE

## 2022-08-02 RX ORDER — DONEPEZIL HYDROCHLORIDE 10 MG/1
10 TABLET, FILM COATED ORAL NIGHTLY
COMMUNITY

## 2022-08-02 RX ORDER — HEPARIN SODIUM AND DEXTROSE 10000; 5 [USP'U]/100ML; G/100ML
INJECTION INTRAVENOUS CONTINUOUS
Status: DISCONTINUED | OUTPATIENT
Start: 2022-08-03 | End: 2022-08-04

## 2022-08-02 RX ORDER — VANCOMYCIN HYDROCHLORIDE
25 ONCE
Status: COMPLETED | OUTPATIENT
Start: 2022-08-02 | End: 2022-08-02

## 2022-08-02 RX ORDER — HEPARIN SODIUM AND DEXTROSE 10000; 5 [USP'U]/100ML; G/100ML
12 INJECTION INTRAVENOUS ONCE
Status: COMPLETED | OUTPATIENT
Start: 2022-08-02 | End: 2022-08-02

## 2022-08-02 RX ORDER — SODIUM CHLORIDE 9 MG/ML
INJECTION, SOLUTION INTRAVENOUS CONTINUOUS
Status: DISCONTINUED | OUTPATIENT
Start: 2022-08-02 | End: 2022-08-02

## 2022-08-02 NOTE — PROGRESS NOTES
NURSING ADMISSION NOTE      Patient admitted via Cart  Oriented to room. Safety precautions initiated. Bed in low position. Call light in reach. Assumed care at 1700, pt a/o x1, lethargic. Arousable. Baseline is RA, currently on 4L via NC. Tele, SR/A-fib. NPO. NaBicarb 100mL/hr. Skin breakdown on sacrum mepilex on. IV vanco. Pt's spouse at bedside. Pt and family updated on poc. Call light in reach. Safety precautions in place.

## 2022-08-02 NOTE — ED INITIAL ASSESSMENT (HPI)
Patient to ER from home via ambulance. Pt vomited last night. Woke up this AM feeling SOB. Pt placed on 15L non-rebreather by EMS. A/o x1. Hard of hearing.    Yeager in place  Wife at bedside

## 2022-08-03 ENCOUNTER — APPOINTMENT (OUTPATIENT)
Dept: CT IMAGING | Facility: HOSPITAL | Age: 87
End: 2022-08-03
Attending: INTERNAL MEDICINE
Payer: MEDICARE

## 2022-08-03 PROBLEM — J96.01 ACUTE RESPIRATORY FAILURE WITH HYPOXIA (HCC): Status: ACTIVE | Noted: 2022-08-03

## 2022-08-03 PROBLEM — G92.8 TOXIC METABOLIC ENCEPHALOPATHY: Status: ACTIVE | Noted: 2022-08-03

## 2022-08-03 PROBLEM — J96.01 ACUTE RESPIRATORY FAILURE WITH HYPOXIA (HCC): Status: RESOLVED | Noted: 2022-08-03 | Resolved: 2022-08-03

## 2022-08-03 LAB
ALBUMIN SERPL-MCNC: 2 G/DL (ref 3.4–5)
ALBUMIN/GLOB SERPL: 0.6 {RATIO} (ref 1–2)
ALP LIVER SERPL-CCNC: 87 U/L
ALT SERPL-CCNC: 19 U/L
ANION GAP SERPL CALC-SCNC: 8 MMOL/L (ref 0–18)
APTT PPP: 48.3 SECONDS (ref 23.3–35.6)
APTT PPP: 61.7 SECONDS (ref 23.3–35.6)
AST SERPL-CCNC: 19 U/L (ref 15–37)
BASOPHILS # BLD AUTO: 0.02 X10(3) UL (ref 0–0.2)
BASOPHILS NFR BLD AUTO: 0.2 %
BILIRUB SERPL-MCNC: 0.8 MG/DL (ref 0.1–2)
BUN BLD-MCNC: 51 MG/DL (ref 7–18)
CALCIUM BLD-MCNC: 8.4 MG/DL (ref 8.5–10.1)
CHLORIDE SERPL-SCNC: 114 MMOL/L (ref 98–112)
CO2 SERPL-SCNC: 22 MMOL/L (ref 21–32)
CREAT BLD-MCNC: 1.19 MG/DL
EOSINOPHIL # BLD AUTO: 0 X10(3) UL (ref 0–0.7)
EOSINOPHIL NFR BLD AUTO: 0 %
ERYTHROCYTE [DISTWIDTH] IN BLOOD BY AUTOMATED COUNT: 14.1 %
GFR SERPLBLD BASED ON 1.73 SQ M-ARVRAT: 56 ML/MIN/1.73M2 (ref 60–?)
GLOBULIN PLAS-MCNC: 3.5 G/DL (ref 2.8–4.4)
GLUCOSE BLD-MCNC: 118 MG/DL (ref 70–99)
HCT VFR BLD AUTO: 37.7 %
HGB BLD-MCNC: 12.6 G/DL
IMM GRANULOCYTES # BLD AUTO: 0.1 X10(3) UL (ref 0–1)
IMM GRANULOCYTES NFR BLD: 0.9 %
LYMPHOCYTES # BLD AUTO: 0.6 X10(3) UL (ref 1–4)
LYMPHOCYTES NFR BLD AUTO: 5.1 %
MAGNESIUM SERPL-MCNC: 1.8 MG/DL (ref 1.6–2.6)
MCH RBC QN AUTO: 31.8 PG (ref 26–34)
MCHC RBC AUTO-ENTMCNC: 33.4 G/DL (ref 31–37)
MCV RBC AUTO: 95.2 FL
MONOCYTES # BLD AUTO: 0.56 X10(3) UL (ref 0.1–1)
MONOCYTES NFR BLD AUTO: 4.8 %
NEUTROPHILS # BLD AUTO: 10.48 X10 (3) UL (ref 1.5–7.7)
NEUTROPHILS # BLD AUTO: 10.48 X10(3) UL (ref 1.5–7.7)
NEUTROPHILS NFR BLD AUTO: 89 %
OSMOLALITY SERPL CALC.SUM OF ELEC: 313 MOSM/KG (ref 275–295)
PLATELET # BLD AUTO: 177 10(3)UL (ref 150–450)
POTASSIUM SERPL-SCNC: 3.7 MMOL/L (ref 3.5–5.1)
PROT SERPL-MCNC: 5.5 G/DL (ref 6.4–8.2)
RBC # BLD AUTO: 3.96 X10(6)UL
SODIUM SERPL-SCNC: 144 MMOL/L (ref 136–145)
WBC # BLD AUTO: 11.8 X10(3) UL (ref 4–11)

## 2022-08-03 PROCEDURE — 74178 CT ABD&PLV WO CNTR FLWD CNTR: CPT | Performed by: INTERNAL MEDICINE

## 2022-08-03 PROCEDURE — 99233 SBSQ HOSP IP/OBS HIGH 50: CPT | Performed by: INTERNAL MEDICINE

## 2022-08-03 RX ORDER — POLYETHYLENE GLYCOL 3350 17 G/17G
17 POWDER, FOR SOLUTION ORAL 2 TIMES DAILY
Status: DISCONTINUED | OUTPATIENT
Start: 2022-08-03 | End: 2022-08-07

## 2022-08-03 RX ORDER — SENNOSIDES 8.6 MG
8.6 TABLET ORAL 2 TIMES DAILY
Status: DISCONTINUED | OUTPATIENT
Start: 2022-08-03 | End: 2022-08-07

## 2022-08-03 RX ORDER — POTASSIUM CHLORIDE 14.9 MG/ML
20 INJECTION INTRAVENOUS ONCE
Status: COMPLETED | OUTPATIENT
Start: 2022-08-03 | End: 2022-08-03

## 2022-08-03 RX ORDER — IOHEXOL 350 MG/ML
80 INJECTION, SOLUTION INTRAVENOUS
Status: COMPLETED | OUTPATIENT
Start: 2022-08-03 | End: 2022-08-03

## 2022-08-03 RX ORDER — DOCUSATE SODIUM 100 MG/1
100 CAPSULE, LIQUID FILLED ORAL 2 TIMES DAILY
Status: DISCONTINUED | OUTPATIENT
Start: 2022-08-03 | End: 2022-08-07

## 2022-08-03 RX ORDER — MAGNESIUM SULFATE HEPTAHYDRATE 40 MG/ML
2 INJECTION, SOLUTION INTRAVENOUS ONCE
Status: COMPLETED | OUTPATIENT
Start: 2022-08-03 | End: 2022-08-03

## 2022-08-03 NOTE — CM/SW NOTE
Pt is a 79 yo male admitted for sepsis. Order received for advance directives - pt already has a DNAR select on file. Spoke with pt's wife Isamar Woods who says pt lives with her. He has a history of dementia and is [place]. Pt is room air at baseline and uses home 02. Wife is interested in talking to someone about palliative care/hospice. Will need an order for PC/hospice.        08/03/22 1100   CM/SW Referral Data   Referral Source Physician   Reason for Referral Discharge planning   Informant Spouse/Significant Other   Patient [address]   Patient lives with Spouse/Significant other   Discharge Needs   Anticipated D/C needs To be determined

## 2022-08-03 NOTE — PROGRESS NOTES
Pt is alert, confused, doesn't know where he is. Hx dementia, [place]. Dr is in room talking to wife and family on the phone, wife is considering hospice. Base is RA, presently on 4 L NC. sats >92%. SCDS, Tele SR Freq PAC, PVC\"S. Yeager yellow and cloudy. Was changed out in ER on admission. Mepilex to sacrum changed today. Vanco and Zosyn IV. Hepain gtt infusing at 800 units per hr. Wife at bedside. Will continue to monitor, bed alarm is on.

## 2022-08-03 NOTE — CM/SW NOTE
Hospice order received. Left message for Roseann at [facility] letting her know we have the hospice order for her to speak with pt's wife about hospice services.

## 2022-08-03 NOTE — PLAN OF CARE
PT VERY DROWSY, AROUSABLE, OPENS EYES, 96% ON 4L, SR/ST/FREQ PAC/PVC, WOOTEN DRAINING BROWN URINE, NA BICARB INFUSING, HEPARIN GTT INITIATED, IV VANCO/ZOSYN, NPO, MEPILEX ON SACRUM, WIFE AT BEDSIDE, LABS IN AM, INSTRUCTED WIFE ON POC  0430 - MORE AWAKE, TALKING, SMALL SMEAR BM, CHANGED MEPILEX TO SACRUM,   0630 - NOTIFIED DR. ARMSTRONG OF POSITIVE BLOOD CULTURE RESULTS    Problem: HEMATOLOGIC - ADULT  Goal: Maintains hematologic stability  Description: INTERVENTIONS  - Assess for signs and symptoms of bleeding or hemorrhage  - Monitor labs and vital signs for trends  - Administer supportive blood products/factors, fluids and medications as ordered and appropriate  - Administer supportive blood products/factors as ordered and appropriate  Outcome: Progressing  Goal: Free from bleeding injury  Description: (Example usage: patient with low platelets)  INTERVENTIONS:  - Avoid intramuscular injections, enemas and rectal medication administration  - Ensure safe mobilization of patient  - Hold pressure on venipuncture sites to achieve adequate hemostasis  - Assess for signs and symptoms of internal bleeding  - Monitor lab trends  - Patient is to report abnormal signs of bleeding to staff  - Avoid use of toothpicks and dental floss  - Use electric shaver for shaving  - Use soft bristle tooth brush  - Limit straining and forceful nose blowing  Outcome: Progressing     Problem: NEUROLOGICAL - ADULT  Goal: Achieves stable or improved neurological status  Description: INTERVENTIONS  - Assess for and report changes in neurological status  - Initiate measures to prevent increased intracranial pressure  - Maintain blood pressure and fluid volume within ordered parameters to optimize cerebral perfusion and minimize risk of hemorrhage  - Monitor temperature, glucose, and sodium.  Initiate appropriate interventions as ordered  Outcome: Progressing

## 2022-08-03 NOTE — CONSULTS
[facility] Dosing Service    Initial Pharmacokinetic Consult for Vancomycin Dosing     Moncho Robertson is a 80year old patient who is being treated for sepsis. Weights:  Ideal body weight: 54.6 kg (120 lb 5.9 oz)  Adjusted ideal body weight: 56 kg (123 lb 6.8 oz)  Actual weight:  58.1 kg (128 lb)    Labs:  Lab Results   Component Value Date    CREATSERUM 2.51 08/02/2022      CrCl:  Estimated Creatinine Clearance: 13.3 mL/min (A) (based on SCr of 2.51 mg/dL (H)). Based on the above:    1. This patient has received a loading dose of Vancomycin  1500 mg IVPB (25 mg/kg, capped at 2000 mg) x 1 dose. This will be followed by 1000 mg IVPB every 48 hours based upon actual body weight of 58.1 kg and renal function. 2. Vancomycin level will be obtained prior to the 3rd dose. Goal trough is 10-15 mcg/mL unless otherwise noted by ordering provider. 3. Pharmacy will order SCr as clinically indicated while on vancomycin to assess renal function. 4. Pharmacy will follow and monitor renal function, toxicity and efficacy. We appreciate the opportunity to assist in the care of this patient.     Walter Cabrera, Ismael  8/2/2022  9:26 PM  [address] [place] [address] Extension: [phone]

## 2022-08-03 NOTE — CM/SW NOTE
Received referral , michael placed. Wife at bedside. Discussed hospice services and goals of care. Wife stated that she wanted her  to receive the 2 weeks of antibiotics and then go home with hospice. She wants all treatments and interventions done and will discuss hospice he is ready to be discharged, Information provided.

## 2022-08-03 NOTE — PROGRESS NOTES
[facility] Pharmacy Note:  Renal Adjustment for piperacillin/tazobactam (Camillia Netter)    Wilma Peralta is a 80year old patient who has been prescribed piperacillin/tazobactam (ZOSYN) 3.375 g every 12 hrs. The estimated creatinine clearance is 28 mL/min (based on SCr of 1.19 mg/dL). The dose has been adjusted to piperacillin/tazobactam (ZOSYN) 3.375 g every 8 hrs per hospital renal dose adjustment protocol for treatment of sepsis. Pharmacy will follow and adjust dose as warranted for additional renal function changes. [facility] Pharmacy Note:  Renal Adjustment for vancomycin (Reid Gravel)    Wilma Peralta is a 80year old patient who has been prescribed vancomycin (VANCOCIN) 1000 mg every 48 hrs. The estimated creatinine clearance is 28 mL/min (based on SCr of 1.19 mg/dL). The dose has been adjusted to vancomycin (VANCOCIN) 1000 mg every 24 hrs per hospital renal dose adjustment protocol for treatment of sepsis. Pharmacy will follow and adjust dose as warranted for additional renal function changes.     Thank you,    Jaspreet Fernandes, PharmD  8/3/2022  2:28 PM

## 2022-08-04 PROBLEM — I26.92 CHRONIC SADDLE PULMONARY EMBOLISM WITHOUT ACUTE COR PULMONALE (HCC): Status: ACTIVE | Noted: 2022-04-25

## 2022-08-04 PROBLEM — I27.82 CHRONIC SADDLE PULMONARY EMBOLISM WITHOUT ACUTE COR PULMONALE (HCC): Status: ACTIVE | Noted: 2022-04-25

## 2022-08-04 LAB
ANION GAP SERPL CALC-SCNC: 7 MMOL/L (ref 0–18)
APTT PPP: 58.7 SECONDS (ref 23.3–35.6)
APTT PPP: 75.2 SECONDS (ref 23.3–35.6)
ATRIAL RATE: 125 BPM
BUN BLD-MCNC: 42 MG/DL (ref 7–18)
CALCIUM BLD-MCNC: 8.5 MG/DL (ref 8.5–10.1)
CHLORIDE SERPL-SCNC: 113 MMOL/L (ref 98–112)
CO2 SERPL-SCNC: 23 MMOL/L (ref 21–32)
CREAT BLD-MCNC: 0.83 MG/DL
ERYTHROCYTE [DISTWIDTH] IN BLOOD BY AUTOMATED COUNT: 13.9 %
GFR SERPLBLD BASED ON 1.73 SQ M-ARVRAT: 80 ML/MIN/1.73M2 (ref 60–?)
GLUCOSE BLD-MCNC: 113 MG/DL (ref 70–99)
HCT VFR BLD AUTO: 36.5 %
HGB BLD-MCNC: 11.8 G/DL
MAGNESIUM SERPL-MCNC: 2 MG/DL (ref 1.6–2.6)
MCH RBC QN AUTO: 31.4 PG (ref 26–34)
MCHC RBC AUTO-ENTMCNC: 32.3 G/DL (ref 31–37)
MCV RBC AUTO: 97.1 FL
OSMOLALITY SERPL CALC.SUM OF ELEC: 307 MOSM/KG (ref 275–295)
P AXIS: 78 DEGREES
P-R INTERVAL: 166 MS
PLATELET # BLD AUTO: 150 10(3)UL (ref 150–450)
POTASSIUM SERPL-SCNC: 3.6 MMOL/L (ref 3.5–5.1)
POTASSIUM SERPL-SCNC: 3.6 MMOL/L (ref 3.5–5.1)
Q-T INTERVAL: 312 MS
QRS DURATION: 84 MS
QTC CALCULATION (BEZET): 450 MS
R AXIS: 55 DEGREES
RBC # BLD AUTO: 3.76 X10(6)UL
SODIUM SERPL-SCNC: 143 MMOL/L (ref 136–145)
T AXIS: 79 DEGREES
VENTRICULAR RATE: 125 BPM
WBC # BLD AUTO: 7.8 X10(3) UL (ref 4–11)

## 2022-08-04 PROCEDURE — 99233 SBSQ HOSP IP/OBS HIGH 50: CPT | Performed by: INTERNAL MEDICINE

## 2022-08-04 NOTE — PLAN OF CARE
8/4 AM: Pt is A/O x 1-2, alert to self and family members. Lung sounds are diminished, 2L for comfort. BP controlled, patient's HR , PACs and PVCs noted. Yeager is draining dark yellow urine. Urology and ID consulted. Speech therapy worked with patient, recommending soft/easy to chew with thin liquids, pills crushed with apple sauce. Urology discussed possible stent placement with patient's wife, deferred procedure. Multidisciplinary Discharge Rounds held 8/4/2022. Treatment team members present today include , , Charge Nurse, Nurse, RT, PT and Pharmacy caring for [employer] Other care providers present:    Mobility Goal: Turn Q2    Readmission Risk:     [x] Low     [] Medium     [] High    Active issue(s) requiring resolution prior to discharge: UTI    Anticipated discharge date: Unknown    Current discharge plan: DC to home    F/U appointment scheduled within 7 days. .. [] [facility] ([facility]     [] Pulmonologist     [x] Primary Care Physician     [x] Other Specialty    Watched the home discharge recovery videos related to diagnosis. .. [] Pneumonia     [] COPD    [x] Home Health set up. [x] Care partner identified and updated with the plan of care.       Problem: HEMATOLOGIC - ADULT  Goal: Maintains hematologic stability  Description: INTERVENTIONS  - Assess for signs and symptoms of bleeding or hemorrhage  - Monitor labs and vital signs for trends  - Administer supportive blood products/factors, fluids and medications as ordered and appropriate  - Administer supportive blood products/factors as ordered and appropriate  Outcome: Progressing  Goal: Free from bleeding injury  Description: (Example usage: patient with low platelets)  INTERVENTIONS:  - Avoid intramuscular injections, enemas and rectal medication administration  - Ensure safe mobilization of patient  - Hold pressure on venipuncture sites to achieve adequate hemostasis  - Assess for signs and symptoms of internal bleeding  - Monitor lab trends  - Patient is to report abnormal signs of bleeding to staff  - Avoid use of toothpicks and dental floss  - Use electric shaver for shaving  - Use soft bristle tooth brush  - Limit straining and forceful nose blowing  Outcome: Progressing     Problem: NEUROLOGICAL - ADULT  Goal: Achieves stable or improved neurological status  Description: INTERVENTIONS  - Assess for and report changes in neurological status  - Initiate measures to prevent increased intracranial pressure  - Maintain blood pressure and fluid volume within ordered parameters to optimize cerebral perfusion and minimize risk of hemorrhage  - Monitor temperature, glucose, and sodium.  Initiate appropriate interventions as ordered  Outcome: Progressing     Problem: Patient/Family Goals  Goal: Patient/Family Long Term Goal  Description: Patient's Long Term Goal: Go home on hospice    Interventions:  - Medication  - Social work  - See additional Care Plan goals for specific interventions  Outcome: Progressing  Goal: Patient/Family Short Term Goal  Description: Patient's Short Term Goal: 8/4 AM: Monitor I/Os, Yeager care    Interventions:   - Yeager care  - See additional Care Plan goals for specific interventions  Outcome: Progressing

## 2022-08-04 NOTE — CM/SW NOTE
Sw received call from wife's daughter - she had questions re: hospice. She states that her mom is overwhelmed and wanted her daughter to help- contact number [phone] Arthur Salazar. Discussed Home Care vs Hospice vs Nursing Home.       Garrett Prieto [facility]  /Discharge Planner  [phone]

## 2022-08-04 NOTE — PLAN OF CARE
Pt alert to self, confused. [place]. Upper and partial lower dentures found in pts bed. VSS on 2L. Tele NSR/ST, freq PACs and PVCs. Heparin gtt at 800 units/hr, PTT redraw in AM. SCDs. Incontinent, briefed. BM today. Yeager draining cloudy yellow urine. Denies pain at this time. IV abx. Bedrest. Mepilex on sacrum changed, aloe vesta applied. Regular diet, pills crushed in applesauce. Family member at bedside, updated on POC. Call light in reach, all needs met at this time. Problem: HEMATOLOGIC - ADULT  Goal: Maintains hematologic stability  Description: INTERVENTIONS  - Assess for signs and symptoms of bleeding or hemorrhage  - Monitor labs and vital signs for trends  - Administer supportive blood products/factors, fluids and medications as ordered and appropriate  - Administer supportive blood products/factors as ordered and appropriate  Outcome: Progressing  Goal: Free from bleeding injury  Description: (Example usage: patient with low platelets)  INTERVENTIONS:  - Avoid intramuscular injections, enemas and rectal medication administration  - Ensure safe mobilization of patient  - Hold pressure on venipuncture sites to achieve adequate hemostasis  - Assess for signs and symptoms of internal bleeding  - Monitor lab trends  - Patient is to report abnormal signs of bleeding to staff  - Avoid use of toothpicks and dental floss  - Use electric shaver for shaving  - Use soft bristle tooth brush  - Limit straining and forceful nose blowing  Outcome: Progressing     Problem: NEUROLOGICAL - ADULT  Goal: Achieves stable or improved neurological status  Description: INTERVENTIONS  - Assess for and report changes in neurological status  - Initiate measures to prevent increased intracranial pressure  - Maintain blood pressure and fluid volume within ordered parameters to optimize cerebral perfusion and minimize risk of hemorrhage  - Monitor temperature, glucose, and sodium.  Initiate appropriate interventions as ordered  Outcome: Progressing

## 2022-08-05 LAB
ANION GAP SERPL CALC-SCNC: 6 MMOL/L (ref 0–18)
APTT PPP: 43.9 SECONDS (ref 23.3–35.6)
BUN BLD-MCNC: 27 MG/DL (ref 7–18)
CALCIUM BLD-MCNC: 8.2 MG/DL (ref 8.5–10.1)
CHLORIDE SERPL-SCNC: 111 MMOL/L (ref 98–112)
CO2 SERPL-SCNC: 26 MMOL/L (ref 21–32)
CREAT BLD-MCNC: 0.78 MG/DL
GFR SERPLBLD BASED ON 1.73 SQ M-ARVRAT: 82 ML/MIN/1.73M2 (ref 60–?)
GLUCOSE BLD-MCNC: 107 MG/DL (ref 70–99)
OSMOLALITY SERPL CALC.SUM OF ELEC: 302 MOSM/KG (ref 275–295)
PLATELET # BLD AUTO: 144 10(3)UL (ref 150–450)
POTASSIUM SERPL-SCNC: 3.3 MMOL/L (ref 3.5–5.1)
POTASSIUM SERPL-SCNC: 3.9 MMOL/L (ref 3.5–5.1)
SODIUM SERPL-SCNC: 143 MMOL/L (ref 136–145)

## 2022-08-05 PROCEDURE — 99232 SBSQ HOSP IP/OBS MODERATE 35: CPT | Performed by: HOSPITALIST

## 2022-08-05 RX ORDER — POTASSIUM CHLORIDE 1.5 G/1.77G
40 POWDER, FOR SOLUTION ORAL EVERY 4 HOURS
Status: COMPLETED | OUTPATIENT
Start: 2022-08-05 | End: 2022-08-05

## 2022-08-05 RX ORDER — LEVOFLOXACIN 750 MG/1
750 TABLET ORAL DAILY
Qty: 15 TABLET | Refills: 0 | Status: SHIPPED | OUTPATIENT
Start: 2022-08-05 | End: 2022-08-20

## 2022-08-05 NOTE — PLAN OF CARE
8/5 AM: Pt is A/O x 1-2, alert to self and wife. Lung sounds are diminished, on room air. BP controlled, NSR with PACs and PVCs, no fevers. BM today, belcher in place, draining dark yellow urine. Mobility wise, patient can sit at the edge of the bed with assistance. Tolerating diet, wife at beside. Still waiting for sensitivities from cultures. Patient will go home with hospice. Multidisciplinary Discharge Rounds held 8/5/2022. Treatment team members present today include , , Charge Nurse, Nurse, RT, PT and Pharmacy caring for [employer] Other care providers present:    Mobility Goal: Stand at edge of bed    Readmission Risk:     [x] Low     [] Medium     [] High    Active issue(s) requiring resolution prior to discharge: UTI    Anticipated discharge date: Unknown    Current discharge plan: DC to home with hospice    F/U appointment scheduled within 7 days. .. [] [facility] ([facility])     [] Pulmonologist     [x] Primary Care Physician     [] Other Specialty    Watched the home discharge recovery videos related to diagnosis. .. [] Pneumonia     [] COPD    [x] Home Health set up. [x] Care partner identified and updated with the plan of care.       Problem: HEMATOLOGIC - ADULT  Goal: Maintains hematologic stability  Description: INTERVENTIONS  - Assess for signs and symptoms of bleeding or hemorrhage  - Monitor labs and vital signs for trends  - Administer supportive blood products/factors, fluids and medications as ordered and appropriate  - Administer supportive blood products/factors as ordered and appropriate  Outcome: Progressing  Goal: Free from bleeding injury  Description: (Example usage: patient with low platelets)  INTERVENTIONS:  - Avoid intramuscular injections, enemas and rectal medication administration  - Ensure safe mobilization of patient  - Hold pressure on venipuncture sites to achieve adequate hemostasis  - Assess for signs and symptoms of internal bleeding  - Monitor lab trends  - Patient is to report abnormal signs of bleeding to staff  - Avoid use of toothpicks and dental floss  - Use electric shaver for shaving  - Use soft bristle tooth brush  - Limit straining and forceful nose blowing  Outcome: Progressing     Problem: NEUROLOGICAL - ADULT  Goal: Achieves stable or improved neurological status  Description: INTERVENTIONS  - Assess for and report changes in neurological status  - Initiate measures to prevent increased intracranial pressure  - Maintain blood pressure and fluid volume within ordered parameters to optimize cerebral perfusion and minimize risk of hemorrhage  - Monitor temperature, glucose, and sodium.  Initiate appropriate interventions as ordered  Outcome: Progressing     Problem: Patient/Family Goals  Goal: Patient/Family Long Term Goal  Description: Patient's Long Term Goal: Go home on hospice    Interventions:  - Medication  - Social work  - See additional Care Plan goals for specific interventions  Outcome: Progressing  Goal: Patient/Family Short Term Goal  Description: Patient's Short Term Goal: 8/4 AM: Monitor I/Os, Yeager care  8/4 noc: rest/sleep,safety  8/5 AM: Rest  Interventions:   - Yeager care  - See additional Care Plan goals for specific interventions  Outcome: Progressing

## 2022-08-05 NOTE — CM/SW NOTE
Sw met with pt's wife today. She is planning on having hospice follow pt at dc. She would like to get final cultures back so he can dc on the correct antibiotic. Her daughter that lives with her is out of town so she is hoping that he can stay in hospital until Sunday when she returns. [facility] to meet with wife.     Otto Sharma LCSW  /Discharge Planner  [phone]

## 2022-08-05 NOTE — PLAN OF CARE
Patient A&Ox1-2. Patient in bed resting comfortably, wife at bedside. Denies pain. Patient resting on room air. Patient remains on tele and  monitoring. Patient incontinent, brief is on and chronic Yeager is draining. Pills crushed with apple sauce. Fall precautions in place. Call light in reach  Problem: HEMATOLOGIC - ADULT  Goal: Maintains hematologic stability  Description: INTERVENTIONS  - Assess for signs and symptoms of bleeding or hemorrhage  - Monitor labs and vital signs for trends  - Administer supportive blood products/factors, fluids and medications as ordered and appropriate  - Administer supportive blood products/factors as ordered and appropriate  8/4/2022 2103 by Jose Soto RN  Outcome: Progressing  8/4/2022 2102 by Jose Soto RN  Outcome: Progressing  Goal: Free from bleeding injury  Description: (Example usage: patient with low platelets)  INTERVENTIONS:  - Avoid intramuscular injections, enemas and rectal medication administration  - Ensure safe mobilization of patient  - Hold pressure on venipuncture sites to achieve adequate hemostasis  - Assess for signs and symptoms of internal bleeding  - Monitor lab trends  - Patient is to report abnormal signs of bleeding to staff  - Avoid use of toothpicks and dental floss  - Use electric shaver for shaving  - Use soft bristle tooth brush  - Limit straining and forceful nose blowing  8/4/2022 2103 by Jose Soto RN  Outcome: Progressing  8/4/2022 2102 by Jose Soto RN  Outcome: Progressing     Problem: NEUROLOGICAL - ADULT  Goal: Achieves stable or improved neurological status  Description: INTERVENTIONS  - Assess for and report changes in neurological status  - Initiate measures to prevent increased intracranial pressure  - Maintain blood pressure and fluid volume within ordered parameters to optimize cerebral perfusion and minimize risk of hemorrhage  - Monitor temperature, glucose, and sodium.  Initiate appropriate interventions as ordered  8/4/2022 2103 by Guerda Gar RN  Outcome: Progressing  8/4/2022 2102 by Guerda Gar RN  Outcome: Progressing     Problem: Patient/Family Goals  Goal: Patient/Family Long Term Goal  Description: Patient's Long Term Goal: Go home on hospice    Interventions:  - Medication  - Social work  - See additional Care Plan goals for specific interventions  8/4/2022 2103 by Guerda Gar RN  Outcome: Progressing  8/4/2022 2102 by Guerda Gar RN  Outcome: Progressing  Goal: Patient/Family Short Term Goal  Description: Patient's Short Term Goal: 8/4 AM: Monitor I/Os, Yeager care  8/4 noc: rest/sleep,safety  Interventions:   - Yeager care  - See additional Care Plan goals for specific interventions  8/4/2022 2103 by Guerda Gar RN  Outcome: Progressing  8/4/2022 2102 by Guerda Gar RN  Outcome: Progressing

## 2022-08-05 NOTE — CM/SW NOTE
Spoke with wife , scheduled meeting for 2pm Sunday for consents and discharge planning on Monday after blood tests results come back She wants him to have the most appropriate antibiotics for his infection. Will meet on Sunday at 2pm in room.

## 2022-08-06 PROCEDURE — 99232 SBSQ HOSP IP/OBS MODERATE 35: CPT | Performed by: HOSPITALIST

## 2022-08-06 NOTE — PLAN OF CARE
Pt Aox1(confused), on RA, and NSR on telemetry. Wife at bedside helping with feeding and ADLs. Pt remained free from falls on current shift. IV ABX as scheduled. Cleared for DC per MD but awaiting hospice consult with hospice RN. Tentative DC for 8/7 per family. Pt with no complaints throughout shift. Wife updated on plan of care. Problem: HEMATOLOGIC - ADULT  Goal: Maintains hematologic stability  Description: INTERVENTIONS  - Assess for signs and symptoms of bleeding or hemorrhage  - Monitor labs and vital signs for trends  - Administer supportive blood products/factors, fluids and medications as ordered and appropriate  - Administer supportive blood products/factors as ordered and appropriate  Outcome: Progressing  Goal: Free from bleeding injury  Description: (Example usage: patient with low platelets)  INTERVENTIONS:  - Avoid intramuscular injections, enemas and rectal medication administration  - Ensure safe mobilization of patient  - Hold pressure on venipuncture sites to achieve adequate hemostasis  - Assess for signs and symptoms of internal bleeding  - Monitor lab trends  - Patient is to report abnormal signs of bleeding to staff  - Avoid use of toothpicks and dental floss  - Use electric shaver for shaving  - Use soft bristle tooth brush  - Limit straining and forceful nose blowing  Outcome: Progressing     Problem: NEUROLOGICAL - ADULT  Goal: Achieves stable or improved neurological status  Description: INTERVENTIONS  - Assess for and report changes in neurological status  - Initiate measures to prevent increased intracranial pressure  - Maintain blood pressure and fluid volume within ordered parameters to optimize cerebral perfusion and minimize risk of hemorrhage  - Monitor temperature, glucose, and sodium.  Initiate appropriate interventions as ordered  Outcome: Progressing     Problem: Patient/Family Goals  Goal: Patient/Family Long Term Goal  Description: Patient's Long Term Goal: Go home on hospice    Interventions:  - Medication  - Social work  - See additional Care Plan goals for specific interventions  Outcome: Progressing  Goal: Patient/Family Short Term Goal  Description: Patient's Short Term Goal: 8/4 AM: Monitor I/Os, Yeager care  8/4 noc: rest/sleep,safety  8/5 AM: Rest  Interventions:   - Yeager care  - See additional Care Plan goals for specific interventions  Outcome: Progressing

## 2022-08-07 VITALS
SYSTOLIC BLOOD PRESSURE: 128 MMHG | HEIGHT: 62 IN | OXYGEN SATURATION: 94 % | BODY MASS INDEX: 23.5 KG/M2 | HEART RATE: 65 BPM | RESPIRATION RATE: 18 BRPM | WEIGHT: 127.69 LBS | TEMPERATURE: 98 F | DIASTOLIC BLOOD PRESSURE: 70 MMHG

## 2022-08-07 PROCEDURE — 99239 HOSP IP/OBS DSCHRG MGMT >30: CPT | Performed by: HOSPITALIST

## 2022-08-07 NOTE — HOSPICE RN NOTE
[facility] RN met with patient's spouse at bedside to discuss hospice philosophy and goals of care. Spouse ready to move forward with hospice. Consents signed, DNR/Comfort POLST filled out. Booklet left with 24 hour hospice number. Patient to discharge to home with hospice this evening at 6:45PM ambulance . [facility] RN to meet patient and spouse at home for admission.      Jenniffer Zuniga, [facility] TNL  After hours: [phone]  Office: [phone]

## 2022-08-07 NOTE — PLAN OF CARE
Pt and wife met with hospice RN and signed consents for home hospice care. Yeager intact and draining clear yellow urine. Pt with discharge planning facilitated by MD and Hospice care team. Will continue to monitor until discharge. Problem: HEMATOLOGIC - ADULT  Goal: Maintains hematologic stability  Description: INTERVENTIONS  - Assess for signs and symptoms of bleeding or hemorrhage  - Monitor labs and vital signs for trends  - Administer supportive blood products/factors, fluids and medications as ordered and appropriate  - Administer supportive blood products/factors as ordered and appropriate  Outcome: Completed  Goal: Free from bleeding injury  Description: (Example usage: patient with low platelets)  INTERVENTIONS:  - Avoid intramuscular injections, enemas and rectal medication administration  - Ensure safe mobilization of patient  - Hold pressure on venipuncture sites to achieve adequate hemostasis  - Assess for signs and symptoms of internal bleeding  - Monitor lab trends  - Patient is to report abnormal signs of bleeding to staff  - Avoid use of toothpicks and dental floss  - Use electric shaver for shaving  - Use soft bristle tooth brush  - Limit straining and forceful nose blowing  Outcome: Completed     Problem: NEUROLOGICAL - ADULT  Goal: Achieves stable or improved neurological status  Description: INTERVENTIONS  - Assess for and report changes in neurological status  - Initiate measures to prevent increased intracranial pressure  - Maintain blood pressure and fluid volume within ordered parameters to optimize cerebral perfusion and minimize risk of hemorrhage  - Monitor temperature, glucose, and sodium.  Initiate appropriate interventions as ordered  Outcome: Completed     Problem: Patient/Family Goals  Goal: Patient/Family Long Term Goal  Description: Patient's Long Term Goal: Go home on hospice    Interventions:  - Medication  - Social work  - See additional Care Plan goals for specific interventions  Outcome: Completed  Goal: Patient/Family Short Term Goal  Description: Patient's Short Term Goal: 8/4 AM: Monitor I/Os, Yeager care  8/4 noc: rest/sleep,safety  8/5 AM: Rest  Interventions:   - Yeager care  - See additional Care Plan goals for specific interventions  Outcome: Completed

## 2022-08-07 NOTE — PLAN OF CARE
A&O x1. Hx of dementia. Wife at bedside. Takes meds crushed with applesauce. RA. Lungs are diminished. Tele - NSR with PAC or PVC. Eliquis. Briefed. Incontinent. Yeager draining dark yellow urine. No reports of pain. Able to sit at edge of bed with assistance. Able to roll side to side when changing. Q2 turn. Zosyn. Sacrum - some redness, cream applied and mepliax. IV SL. Regular diet - [place] to chew, chopped pieces. Pt wife updated on POC. No further needs at this time. Possible discharge 8/7. Cleared but waiting for hospice consult with hospice RN, meeting scheduled for 8/7. Problem: HEMATOLOGIC - ADULT  Goal: Maintains hematologic stability  Description: INTERVENTIONS  - Assess for signs and symptoms of bleeding or hemorrhage  - Monitor labs and vital signs for trends  - Administer supportive blood products/factors, fluids and medications as ordered and appropriate  - Administer supportive blood products/factors as ordered and appropriate  Outcome: Progressing     Problem: NEUROLOGICAL - ADULT  Goal: Achieves stable or improved neurological status  Description: INTERVENTIONS  - Assess for and report changes in neurological status  - Initiate measures to prevent increased intracranial pressure  - Maintain blood pressure and fluid volume within ordered parameters to optimize cerebral perfusion and minimize risk of hemorrhage  - Monitor temperature, glucose, and sodium.  Initiate appropriate interventions as ordered  Outcome: Progressing     Problem: Patient/Family Goals  Goal: Patient/Family Long Term Goal  Description: Patient's Long Term Goal: Go home on hospice    Interventions:  - Medication  - Social work  - See additional Care Plan goals for specific interventions  Outcome: Progressing  Goal: Patient/Family Short Term Goal  Description: Patient's Short Term Goal: 8/4 AM: Monitor I/Os, Yeager care  8/4 noc: rest/sleep,safety  8/5 AM: Rest  Interventions:   - Yeager care  - See additional Care Plan goals for specific interventions  Outcome: Progressing

## 2022-08-08 LAB
E CLOAC COMP DNA BLD POS NAA+NON-PROBE: DETECTED
P AERUGINOSA DNA BLD POS NAA+NON-PROBE: DETECTED
STREPTOCOCCUS DNA BLD POS NAA+NON-PROBE: DETECTED

## 2022-08-08 NOTE — PROGRESS NOTES
Ambulance  for discharge at 2020. All paperwork left with patient. Hospice office called with no [id number]([phone]). After hours contacted and notified pt leaving by ambulance. RN was paged per [facility] hospice staff. Pt okay to discharge home. IV and tele all removed prior to discharge.

## 2022-08-08 NOTE — PAYOR COMM NOTE
--------------  DISCHARGE REVIEW    PayorRobyn Amoswu [facility] [facility]  Subscriber #:  [id number]  Authorization Number: [id number]    Admit date: 8/2/22  Admit time:   4:53 PM  Discharge Date: 8/7/2022  8:31 PM     Admitting Physician: Emilie Schwartz DO  Attending Physician:  No att. providers found  Primary Care Physician: Rudolph Canchola MD

## 2023-03-10 NOTE — TELEPHONE ENCOUNTER
IDENTIFYING INFORMATION: The patient is a 69-year-old white female admitted with increasing confusion and reduction in his appetite    CHIEF COMPLAINT: None given     INFORMANT: Patient and chart    RELIABILITY: Fair    HISTORY OF PRESENT ILLNESS: The patient is a 69-year-old white female with a history of treatment for bipolar disorder.  She states to this physician that she was under my care at our [facility] recently though I have no recollection of this.  The patient is currently followed by Dr. Gill and her prescribed psychotropic medications include Depakote mirtazapine and Abilify.  According to the chart she has been diagnosed with early onset Alzheimer's disease.  The patient is currently pleasant cooperative and oriented x4.  On admission her Depakote level was low and Abilify had not been restarted on admission.  She is currently being treated for urinary tract infection.  While she is very cooperative and appropriate during my interview today, charting indicates that she frequently screams out for the nurse.  She denies current suicidal or homicidal ideation or any psychotic symptoms.  She denies recent changes in sleep or appetite.  She does state that she feels less confused than on admission.    PAST PSYCHIATRIC HISTORY: As above    PAST MEDICAL HISTORY: Significant for diabetes mellitus, GERD, UTI, rheumatoid arthritis    MEDICATIONS:   Current Facility-Administered Medications   Medication Dose Route Frequency Provider Last Rate Last Admin   • acetaminophen (TYLENOL) tablet 650 mg  650 mg Oral Q4H PRN Alessio Trevizo MD   650 mg at 03/08/23 2113   • albuterol sulfate HFA (PROVENTIL HFA;VENTOLIN HFA;PROAIR HFA) inhaler 2 puff  2 puff Inhalation Q4H PRN Alessio Trevizo MD       • ARIPiprazole (ABILIFY) tablet 5 mg  5 mg Oral Daily Daniel Ramesh III, MD       • cefTRIAXone (ROCEPHIN) 1 g in sodium chloride 0.9 % 100 mL IVPB-VTB  1 g Intravenous Q24H Alessio Trevizo  mL/hr at  Refill requested: Metoprolol ER 25 mg     Passed protocol      Last refill: 6/26/2018 historical     Relevant Labs: CMP 3/24/2018   BP Readings from Last 3 Encounters:  07/03/18 : 126/80  06/26/18 : 150/88  04/17/18 : 136/80      Last OV / RTC advised: 7/3 03/09/23 1457 1 g at 03/09/23 1457   • dextrose (D50W) (25 g/50 mL) IV injection 25 g  25 g Intravenous Q15 Min PRN Alessio Trevizo MD       • dextrose (GLUTOSE) oral gel 15 g  15 g Oral Q15 Min PRN Alessio Trevizo MD       • divalproex (DEPAKOTE ER) 24 hr tablet 500 mg  500 mg Oral Nightly Daniel Ramesh III, MD       • Enoxaparin Sodium (LOVENOX) syringe 40 mg  40 mg Subcutaneous Daily Alessio Trevizo MD   40 mg at 03/10/23 0824   • glucagon (GLUCAGEN) injection 1 mg  1 mg Intramuscular Q15 Min PRN Alessio Trevizo MD       • guaiFENesin (MUCINEX) 12 hr tablet 1,200 mg  1,200 mg Oral BID PRN Alessio Trevizo MD       • haloperidol lactate (HALDOL) injection 2 mg  2 mg Intravenous Q6H PRN Jerardo Magaña MD   2 mg at 03/09/23 1339   • insulin lispro (ADMELOG) injection 0-9 Units  0-9 Units Subcutaneous TID AC Alessio Trevizo MD       • meclizine (ANTIVERT) tablet 25 mg  25 mg Oral 4x Daily PRN Alessio Trevizo MD       • melatonin tablet 3 mg  3 mg Oral Nightly PRN Alessio Trevizo MD   3 mg at 03/08/23 2112   • mirtazapine (REMERON) tablet 15 mg  15 mg Oral Nightly Alessio Trevizo MD   15 mg at 03/09/23 2241   • nitroglycerin (NITROSTAT) SL tablet 0.4 mg  0.4 mg Sublingual Q5 Min PRN Alessio Trevizo MD       • ondansetron (ZOFRAN) injection 4 mg  4 mg Intravenous Q6H PRN Alessio Trevizo MD       • pantoprazole (PROTONIX) EC tablet 40 mg  40 mg Oral BID Alessio Trevizo MD   40 mg at 03/10/23 0824   • sodium chloride 0.9 % flush 10 mL  10 mL Intravenous PRN Alessio Trevizo MD       • sodium chloride 0.9 % flush 10 mL  10 mL Intravenous Q12H Alessio Trevizo MD   10 mL at 03/10/23 0824   • sodium chloride 0.9 % flush 10 mL  10 mL Intravenous PRN Alessio Trevizo MD       • sodium chloride 0.9 % infusion 40 mL  40 mL Intravenous PRN Alessio Trevizo MD       • sodium chloride 0.9 % infusion  125 mL/hr Intravenous Continuous Alessio Trevizo  mL/hr at 03/10/23 1218 125 mL/hr at 03/10/23 1218         ALLERGIES: The patient lists 7  medical allergies which can be obtained from the chart    FAMILY HISTORY: Noncontributory    SOCIAL HISTORY: No reported use of alcohol tobaccos or street drug    MENTAL STATUS EXAM: The patient is a somewhat disheveled elderly white female who is dressed in hospital garb.  She is awake and alert and oriented x4.  Her mood is calm her affect blunted.  Speech is generally relevant and coherent.  There are no gross deficits in memory or cognition noted though the patient does not comply with formal testing.  She denies current suicidal or homicidal ideation or psychotic features.  She does not appear to be responding to any internal stimuli.  Her judgment and insight appear to be reasonably intact during this interview.    ASSETS/LIABILITIES: To be assessed    DIAGNOSTIC IMPRESSION: Metabolic encephalopathy secondary to UTI superimposed on bipolar disorder and early onset dementia, medical problems described previously    PLAN: I have increased the patient's Depakote in hopes of increasing her level to a therapeutic 1 and have restarted previously prescribed Abilify.  I will also leave orders for as needed Haloperidol for agitation as the patient lists Zyprexa as an allergy.  I will continue to follow with you.

## 2023-03-22 NOTE — CONSULTS
Ortho    Consult received. Left proximal femur fracture. Will require IM nail. Please admit to medicine and provide medical clearance for surgery. Plan for the OR later today. Keep NPO. Patient has been NPO since last night. Not on anticoagulants. Hydroquinone Counseling:  Patient advised that medication may result in skin irritation, lightening (hypopigmentation), dryness, and burning.  In the event of skin irritation, the patient was advised to reduce the amount of the drug applied or use it less frequently.  Rarely, spots that are treated with hydroquinone can become darker (pseudoochronosis).  Should this occur, patient instructed to stop medication and call the office. The patient verbalized understanding of the proper use and possible adverse effects of hydroquinone.  All of the patient's questions and concerns were addressed.

## 2025-03-24 NOTE — PROGRESS NOTES
Kang Angel Author: Felecia Mendez MD     1926 MRN [id number]   [facility]  Admission 21      Last Hospital Discharge 10/1/21 PCP Jorge Nunez MD   Hospital of Discharge  [facility]        CC --admitted to [facility] Pharmacist Admission Medication History    Admission medication history is complete. The information provided in this note is only as accurate as the sources available at the time of the update.    Information Source(s): [facility] via N/A    Pertinent Information: Patient has been intubated and sedated since arrival on 3/18, has anoxic brain injury with poor prognosis. Updated med list via [employer] and marked unable to assess. It appears patient is taking all of the meds listed below except for Atorvastatin which was last filled on May 28, 2024.     Changes made to PTA medication list:  Added: None  Deleted: None  Changed: None    Allergies reviewed with patient and updates made in EHR: unable to assess    Medication History Completed By: VICTORIA WHIPPLE [facility] 3/24/2025 7:50 AM    PTA Med List   Medication Sig Last Dose/Taking    albuterol (VENTOLIN HFA) 108 (90 Base) MCG/ACT Inhaler Inhale 1-2 puffs into the lungs every 4 hours as needed. Unknown    atorvastatin (LIPITOR) 20 MG tablet Take 20 mg by mouth daily. Unknown    fluticasone (FLONASE) 50 MCG/ACT nasal spray Spray 1 spray into both nostrils daily. Unknown    Fluticasone-Umeclidin-Vilant (TRELEGY ELLIPTA) 100-62.5-25 MCG/ACT oral inhaler Inhale 1 puff into the lungs daily. Unknown    furosemide (LASIX) 40 MG tablet Take 40 mg by mouth 2 times daily. Unknown    ipratropium - albuterol 0.5 mg/2.5 mg/3 mL (DUONEB) 0.5-2.5 (3) MG/3ML neb solution Take 1 vial by nebulization every 6 hours as needed for shortness of breath, wheezing or cough. Unknown    latanoprost (XALATAN) 0.005 % ophthalmic solution Place 1 drop into both eyes at bedtime. Unknown    pantoprazole (PROTONIX) 40 MG EC tablet Take 40 mg by mouth daily. 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 ENT: No mouth pain, neck pain, running nose, headaches or swollen glands.   Skin: No rashes, pruritus or skin changes,  Respiratory: Denies cough, wheezing unspecified type        Patient admitted to subacute rehab at [address]    - PT to work on ambulation, gait, balance, strength, endurance, transfers, safety  - OT to work on fine motor skills, ADLs, hygiene, toileting, transfers, safe

## (undated) DIAGNOSIS — I26.92 ACUTE SADDLE PULMONARY EMBOLISM, UNSPECIFIED WHETHER ACUTE COR PULMONALE PRESENT (HCC): Primary | ICD-10-CM

## (undated) DEVICE — DRESSING AQUACEL AG 3.5 X 6

## (undated) DEVICE — 4.0MM SHORT PILOT DRILL WITH AO CONNECTOR: Brand: TRIGEN

## (undated) DEVICE — SUTURE VICRYL 0 CP-1

## (undated) DEVICE — SOL  .9 1000ML BTL

## (undated) DEVICE — SCD SLEEVE KNEE HI BLEND

## (undated) DEVICE — INTERTAN LAG SCREW DRILL: Brand: TRIGEN

## (undated) DEVICE — DRESSING AQUACEL AG 3.5X3.75

## (undated) DEVICE — OCCLUSIVE GAUZE STRIP OVERWRAP,3% BISMUTH TRIBROMOPHENATE IN PETROLATUM BLEND: Brand: XEROFORM

## (undated) DEVICE — C-ARMOR C-ARM EQUIPMENT COVERS CLEAR STERILE UNIVERSAL FIT 12 PER CASE: Brand: C-ARMOR

## (undated) DEVICE — LIGHT HANDLE

## (undated) DEVICE — SUTURE MONOCRYL 2-0 SH

## (undated) DEVICE — GUIDE PIN 3.2MM X 343MM: Brand: TRIGEN

## (undated) DEVICE — HIP PINNING CDS: Brand: MEDLINE INDUSTRIES, INC.

## (undated) DEVICE — C-ARM: Brand: UNBRANDED

## (undated) DEVICE — INTERIGHTAN 7.0MM COMPRESSION                                    SCREW STARIGHTER DRILL: Brand: TRIGEN

## (undated) DEVICE — 7.0MM COMPRESSION SCREW DRILL: Brand: TRIGEN

## (undated) DEVICE — STERILE POLYISOPRENE POWDER-FREE SURGICAL GLOVES: Brand: PROTEXIS

## (undated) DEVICE — 3.0MM X 1000MM BALL TIP GUIDE ROD: Brand: TRIGEN

## (undated) DEVICE — STERILE POLYISOPRENE POWDER-FREE SURGICAL GLOVES WITH EMOLLIENT COATING: Brand: PROTEXIS

## (undated) NOTE — ED AVS SNAPSHOT
Juliana Galeas   MRN: [id number]    Department:  [facility] Emergency Department   Date of Visit:  5/4/2019           Disclosure     Insurance plans vary and the physician(s) referred by the ER may not be covered by your plan.  Please contact tell this physician (or your personal doctor if your instructions are to return to your personal doctor) about any new or lasting problems. The primary care or specialist physician will see patients referred from the [facility] Emergency Department.  Carlos Hathaway

## (undated) NOTE — MR AVS SNAPSHOT
[place]  [address] [facility] [address]  [address] [facility] [address] [phone]               Thank you for choosing us for your health care visit with Josesito Savage MD.  We are glad to serve you and happy to provide you with this mcdaniel authorization numbers or be assured that none are required. You can then schedule your appointment. Failure to obtain required authorization numbers can create reimbursement difficulties for you.     Assoc Dx:  Skin lesion [L98.9]          Reason for Today' [address], [phone] [address] [facility] [facility] [address], Xochitl Flanagan [address]     Phone:  [phone]    - Levothyroxine Sodium 50 MCG Tabs            MyChart               Educational Information     Your blood pressure indicates you may be at-risk FLOORS:  ? Remove all loose wires, cords and throw rugs. ? Keep floors clear of clutter. ? Make sure carpets and area rugs have skid proof backing. ? Do not use slippery wax on bare floors. ? Keep furniture in its accustomed place.    ? If you have pets

## (undated) NOTE — LETTER
11/18/19        [address]      Dear Phillip Diaz records indicate that you have outstanding lab work and or testing that was ordered for you and has not yet been completed: Chest Xray - - Please contact

## (undated) NOTE — MR AVS SNAPSHOT
[place]  [address] [facility] [address]  [address] [facility] [id number] [phone]               Thank you for choosing us for your health care visit with Zulma Lizarraga MD.  We are glad to serve you and happy to provide you with this mcdaniel CBC W Differential W Platelet    Complete by: Apr 03, 2017 (Approximate)    Assoc Dx:  Laboratory examination ordered as part of a routine general medical examination [Z00.00]           PSA    Complete by:   Apr 03, 2017 (Approximate)    Assoc Dx:  Daniela Bruce Commonly known as:  VITAMIN D3                   MyChart     Visit MyChart  You can access your MyChart to more actively manage your health care and view more details from this visit by going to [email or web address].   If you've recently had a stay Weight Reduction Maintain normal body weight (body mass index 18.5-24.9 kg/m2)   DASH eating plan Adopt a diet rich in fruits, vegetables, and low fat dairy products with reduced content of saturated and total fat.    Dietary sodium reduction Reduce dietary

## (undated) NOTE — LETTER
Your patient was recently seen at the [facility] for a hospital follow-up visit. The visit note is attached. Please contact the clinic with any questions at [phone].     Thank you,  KISHORE Ngo

## (undated) NOTE — MR AVS SNAPSHOT
[address] [address]               Thank you for choosing us for your health care visit with Getachew Soni MD.  We are glad to serve you and happy to provide you with this summary ? EFFECTIVE April 1, 2017 PATIENTS MUST  THEIR OWN NARCOTIC PRESCRIPTIONS. ? Written prescriptions must be picked up in office. ? Please allow the office 48-72 hours to fill the prescription. ? Patient must present photo ID at time of . * Atorvastatin Calcium 10 MG Tabs   TAKE 1 TABLET BY MOUTH DAILY. Commonly known as:  LIPITOR           BABY ASPIRIN 81 MG Chew   Generic drug:  aspirin   1 TABLET DAILY           Cephalexin 250 MG Tabs   Take 250 mg by mouth 3 (three) times daily.

## (undated) NOTE — MR AVS SNAPSHOT
[place]  [address] [facility] 100  [address] [facility] [address] [phone]               Thank you for choosing us for your health care visit with Marco Prieto MD.  We are glad to serve you and happy to provide you with this mcdaniel RaNITidine HCl 150 MG Tabs   TAKE ONE TABLET EVERY NIGHT AT BEDTIME   Commonly known as:  ZANTAC           Vitamin D3 1000 units Tabs   Take 1,000 Units by mouth daily. Commonly known as:  VITAMIN D3           * Notice:   This list has 2 medication(s) th

## (undated) NOTE — LETTER
:  1926      To Whom It May Concern: This patient was admitted to our hospital on 2021 and remains hospitalized on 21 until further notice.  Due to his current medical condition the patient will be unable to take flight/trip schedule

## (undated) NOTE — MR AVS SNAPSHOT
[place]  [address] [facility] [address]  [address] [facility] [address] [phone]               Thank you for choosing us for your health care visit with Stefany Jones MD.  We are glad to serve you and happy to provide you with this mcdaniel Commonly known as:  EXELON           Vitamin D3 1000 UNITS Tabs   Take 1,000 Units by mouth daily.    Commonly known as:  VITAMIN D3                Where to Get Your Medications      You can get these medications from any pharmacy     Bring a paper prescrip Women and lighter weight persons: limit to <= 1 drink* per day. Healthy Diet and Regular Exercise  [facility] [address] for making healthy food choices  -   Enjoy your food, but eat less. Fully enjoy your food when eating.    Don’t

## (undated) NOTE — IP AVS SNAPSHOT
[address]            (For Outpatient Use Only) Initial Admit Date: 2022   Inpt/Obs Admit Date: Inpt: 22 / Obs: N/A   Discharge Date:    Hospital Acct:  [de-identified]   MRN: [de-identified]   CSN: [id number]   CEID: [id number]        ENCOUNTER  Patient Class: Inpatient Admitting Provider: Chani Grijalva DO Unit: [address] [facility] Service: Cardiac Telemetry Attending Provider: Zeinab Olsen MD   Bed: 0323-E   Visit Type:   Referring Physician: No ref. provider found Billing Flag:    Admit Diagnosis: Subarachnoid hemorrhage [facility]) [I60.9]      PATIENT  Legal Name:   Charlene Cao   Legal Sex: Male  Gender ID: Male             [address] [place] [address],[address] Name:    PCP:  Jose F Osorio MD Home: [phone]   Address:  [address] [place] [address] : 1926 (89 yrs) Mobile: [phone]         City/State/Zip: [address] [place] [address], [address] [facility] Marital:  Language: [place] park: [place] SSN4: [id number] Sikhism: [address] Not Pamelia Lao*     Race: White Ethnicity: Non  Or [address] [place] [address]   Name Relationship Legal Guardian? Home Phone Work Phone Mobile Phone   1. Gogo Tom  2.  Herminia Fiore  Daughter    [phone]     GUARANTOR  Guarantor: Charlene Cao : 1926 Home Phone: [phone]   Address: [facility]  Sex: Male Work Phone:    City/State/Zip: [address] [place] [address], [address] [facility]   Rel. to Patient: Self Guarantor ID: [id number]   [address]   Employer:  Status: RETIRED     COVERAGE  PRIMARY INSURANCE   PayorAyesha Mendez MA O   Group Number: [id number] Insurance Type: INDEMNITY   Subscriber Name: Gregorio Campuzano : 1926   Subscriber ID: [id number] Pt Rel to Subscriber: Self   SECONDARY INSURANCE   Payor:  Plan:    Group Number:  Insurance Type:    Subscriber Name:  Subscriber :    Subscriber ID:  Pt Rel to Subscriber:    TERTIARY INSURANCE   Payor:  Plan:    Group Number:  Insurance Type: Subscriber Name:  Adan Vanegas :    Subscriber ID:  Pt Rel to Subscriber:    Hospital Account Financial Class: Medicare Advantage    2022

## (undated) NOTE — IP AVS SNAPSHOT
[address]            (For Outpatient Use Only) Initial Admit Date: 10/16/2021   Inpt/Obs Admit Date: Inpt: N/A / Obs: 10/16/21   Discharge Date:    Huel Curling:  [de-identified]   MRN: [de-identified]   CSN: [id number]   CEID: [id number] :    Subscriber ID:  Pt Rel to Subscriber:    Hospital Account Financial Class: Medicare Advantage    2021

## (undated) NOTE — IP AVS SNAPSHOT
Patient Demographics     Address  [address] [address] Phone  [phone] [facility]) *Preferred*  [phone] [facility]) E-mail Address  [email or web address]      Emergency Contact(s)     Name Relation Home Work [phone] Spouse [phone] Medipore/coverlet or gauze. Incision care/Dressing changes  • Wash hands before and after dressing changes.   • There may be one or a few dressings on the hip/leg    FOR MEDIPORE/COVERLET DRESSINGS:  • Change dressing daily using Medipore/coverlet once A to cause breathing problems and can decrease the rate of healing. Medication  Anticoagulants = blood thinners (Xarelto, Eliquis, Lovenox, Coumadin or Aspirin)  • Pill or shot form depending on what your physician orders.    • IF pl breathing and relaxation techniques and distractions can help! If you focus on something else, you do not experience the pain the same. Take advantage of everything available to your to help control you discomfort.   • Contact physician if discomfort does CHF, afib, and asthma just to name a few). It is much better to catch developing problems and prevent them from becoming larger ones. • KAYLI HOSE – IF ordered by your surgeon, wear these during the day and off at night.   Surgeon will tell you when you don where you can easily stretch your legs and get up to walk up and down the aisles…this helps prevent blood clots and stiffness.   • TEMPORARY HANDICAP PARKING APPLICATION  (good for 3-6 months)  – At Surgeon or PCP visit, request they fill out their part of Tabs      Take 1,000 mg by mouth daily. Vitamin D3 (Cholecalciferol) 25 MCG (1000 UT) Tabs  Commonly known as: VITAMIN D3      Take 1,000 Units by mouth daily.                 Where to Get Your Medications      Please  your prescriptions at Scrn by Callum woo [[id number]]  (Normal) Collected: 09/29/21 1642    Order Status: Completed Lab Status: Final result Updated: 09/29/21 5137    Specimen: Other from Nares      Staph Aureus Screen By PCR Negative     MRSA Screen By PCR Negative Surgical History:   Past Surgical History:   Procedure Laterality Date   • APPENDECTOMY     • APPENDECTOMY     • EYE SURGERY      left eye. 7/2016 related to [place] Palsy   • OTHER      injections in knees, unsure of what is injected   • OTHER SURGICAL HIST deformity. Abdomen: Soft, nontender, nondistended. Positive bowel sounds. No rebound, guarding or organomegaly. Neurologic: No focal neurological deficits. CNII-XII grossly intact.   Musculoskeletal: cannot move LLE due to pain   Extremities: No edema or Diann Merino MD at 9/30/2021  8:56 AM     Author: Joslyn Vanegas MD Service: Orthopedics Author Type: Physician    Filed: 9/30/2021  8:56 AM Date of Service: 9/29/2021  6:18 PM Status: Signed    : Joslyn Vanegas MD (Physician)     Consult self cath   • Vitamin D deficiency disease 8/14/2012       Past Surgical History  Past Surgical History:   Procedure Laterality Date   • APPENDECTOMY     • APPENDECTOMY     • EYE SURGERY      left eye. 7/2016 related to [place] Palsy   • OTHER      injection Once PRN  [MAR Hold] levothyroxine tab 75 mcg, 75 mcg, Oral, Before breakfast  tranexamic acid (CYKLOKAPRON) IVPB premix 1,000 mg, 1,000 mg, Intravenous, Once  tranexamic acid (CYKLOKAPRON) IVPB premix 1,000 mg, 1,000 mg, Intravenous, Once      Probiotic P knee  PROM: Pain with passive range of motion of the hip. Grossly normal knee and ankle range of motion  Sensation: intact in all nerve distributions .   Motor function: Plantar flexion, dorsiflexion, EHL function intact with good strength    Right lower E material.  Soft tissue swelling anteromedial aspect of the may reflect contusion in the setting of recent falling. No displacement, fracture, deformity. No dislocation  seen. Arterial calcifications seen.   Degenerative changes in the.   Dictated by (CST 11:20 AM     Finalized by (CST): Migue Weathers MD on 9/29/2021 at 11:23 AM            Impression:     45-year-old male with left proximal femur pertrochanteric fracture    Recommendations:    Patient has a displaced proximal femur fracture.   The diagnosi ASSESSMENT     Pt remains total assist for all functional mobility - required Max A for bed mobility and Max A x 2 for sit<>stand transfer. Pt will currently benefit from [place] sit<>stand lift for OOB transfers.      At this time, Pt. presents with de BASIC MOBILITY  How much difficulty does the patient currently have. ..  -   Turning over in bed (including adjusting bedclothes, sheets and blankets)?: A Lot   -   Sitting down on and standing up from a chair with arms (e.g., wheelchair, bedside commode, e 9/30/2021  2:45 PM Status: Signed    : Teresa Ortiz PT (Physical Therapist)             PHYSICAL THERAPY EVALUATION - INPATIENT     Room Number: 373/373-A  Evaluation Date: 9/30/2021  Type of Evaluation: Initial  Physician Order: SERGIO younger live on main level        Stairs to Bedroom: 0       Lives With: Spouse  Drives: Yes  Patient Owned Equipment: Rolling walker  Patient Regularly Uses: Glasses    Prior Level of [place]: Amb with RW assist at household distance.  Able to get up and use railing?: Total       AM-PAC Score:  Raw Score: 8   Approx Degree of Impairment: 86.62%   Standardized Score (AM-PAC Scale): 28.58   CMS Modifier (G-Code): CM    FUNCTIONAL ABILITY STATUS  Gait Assessment       Comment : not tested    Skilled Therapy Provi patient is below baseline and would benefit from skilled inpatient PT to address the above deficits to assist patient in returning to prior to level of function.   DISCHARGE RECOMMENDATIONS  PT Discharge Recommendations: Sub-acute rehabilitation    PLAN  PT nailing 9/29/21    ASSESSMENT     Patient is a 80year old male admitted on 9/29/2021 with Presenting Problem: fall resulting in left hip closed fracture s/p IM nailing 9/29/21.  PMH of PMH significant for Dementia, Bell's palsy, neurogenic bladder    Patie identified deficits, Activity recommendations, self-care responsibility   Needs reinforcement     Equipment used: hospital bed rail  Would benefit from additional trial      Exercises:    Exercises Repetitions Comments   Scapular elevation     Scapular ret Fair  Frequency (Obs): 3-5x/week    OT Goals: Goals ongoing 10/1  ADL Goals   Patient will perform eating: with set up  Patient will perform lower body dressing:  with max assist of one  Patient will perform toileting: with max assist of one     Functional • Calculus of kidney and ureter    • Dementia ([facility] [address] 7/22/2016   • Esophageal reflux 1/28/2008   • History of UTI 11/3/2015   • HYPOTHYROIDISM    • Memory loss 1/19/2016   • Mixed hyperlipidemia    • Neurogenic bladder 11/3/2015   • Numbness in both legs Repositioning    COGNITION  Attention Span:  attends with cues to redirect  Orientation Level:  oriented to person  Memory:  impaired working memory, decreased recall of biographical information, decreased recall of precautions, decreased recall of recent Patient's wife in room and requesting to allow patient to sleep. Educated on importance of increased activity and reason for today's assessment. Wife agreeable to activity. Supine to EOB with max A of 2, patient resisting and groaning w/ c/o pain.     EO inpatient OT to address the above deficits, maximizing patient’s ability to return safely to his prior level of function.     Patient Complexity  Occupational Profile/Medical History MODERATE - Expanded review of history including review of medical or thera : Omari Kam OT (Occupational Therapist)       Attempted to see patient this am for OT eval, however patient lethargic and having difficulty maintaining alertness long enough to actively participate in evaluation.   Will re-attempt as schedule pe

## (undated) NOTE — Clinical Note
Gisela Patel is neurologically stable and we will repeat CT Brain to follow the subdural hematoma. 2 months ago he was dx with PE and started on Eliquis. Alice Reggie had stopped his Eliquis 3 weeks ago which may have been an error. If he needs to restart the medication please let the patient know and send a new refill.     Thanks,  Cara Quiroz

## (undated) NOTE — LETTER
[address]  [address] [facility]  Authorization for Surgical Operation and Procedure     Date:___________                                                                                                         Time:__________ all, of the potential risks that can occur: fever and allergic reactions, hemolytic reactions, transmission of diseases such as Hepatitis, AIDS and Cytomegalovirus (CMV) and fluid overload.   In the event that I wish to have an autologous transfusion of my physician will determine when the applicable recovery period ends for purposes of reinstating the DNAR order.   10. Patients having a sterilization procedure: I understand that if the procedure is successful the results will be permanent and it will therefo anesthesiologist (anesthesia doctor) to give me medicine and do additional procedures as necessary.  Some examples are: Starting or using an “IV” to give me medicine, fluids or blood during my procedure, and having a breathing tube placed to help me breathe “epidural”, & “nerve blocks”): I understand that rare but potential complications include headache, bleeding, infection, seizure, irregular heart rhythms, and nerve injury.     I can change my mind about having anesthesia services at any time before I get

## (undated) NOTE — LETTER
04/22/19        Zehra Ayon  [address] [place] [address]      Dear Jomar Morales,    Our records indicate that you have outstanding lab work and or testing that was ordered for you and has not yet been completed: Fasting lab work   To complete

## (undated) NOTE — IP AVS SNAPSHOT
Patient Demographics     Address  [address] [address] Phone  [phone] [facility]) *Preferred*  [phone] [facility]) E-mail Address  [email or web address]      Emergency Contact(s)     Name Relation Home Work Mobile    Gogo Tom Spouse 6 for you. If you choose to have this test performed at a location other than those listed above, you are responsible for obtaining any prior authorization required by your benefit plan.     [address] [facility]    ------------------------  THIS IS NOT A information:  [address] [facility],  [address]             Charles Menendez MD In 1 week.     Specialties: Family Medicine, IP Consult to Primary Care  Contact information:  [address] [facility] [address]  415-3 traZODone 50 MG Tabs  Commonly known as: DESYREL  Next dose due: TONIGHT      Take 50 mg by mouth nightly. VITAMIN B-12 OR  Next dose due: AM      Take 1 tablet by mouth daily.           vitamin C 1000 MG Tabs  Next dose due: AM      Take 1,000 m Value Reference Range Flag Lab   Phosphorus 2.9 2.5 - 4.9 mg/dL — [facility])            Vitamin B12 with Reflex to MMA [[id number]]  Resulted: 10/17/21 2300, Result status: Final result   Ordering provider:  Nolan Warren MD  10/17/21 1052 Resulting Order Status: Completed Lab Status: Final result Updated: 10/16/21 0924    Specimen: Other from Nares      Rapid SARS-CoV-2 by PCR Not Detected         H&P - H&P Note      H&P signed by Sarah Vanegas MD at 10/16/2021  7:07 PM  Version 2 of 2    Author: Pedrito Martinez injected   • OTHER SURGICAL HISTORY      TURP and YB plasty   • SPECIAL SERVICE OR REPORT      y v plasty       Social History:  reports that he has never smoked. He has never used smokeless tobacco. He reports current alcohol use.  He reports that he does Right arm)   Pulse 70   Temp 98 °F (36.7 °C) (Oral)   Resp 18   Wt 151 lb (68.5 kg)   SpO2 95%   BMI 25.92 kg/m²   General: No acute distress. Alert; confused  HEENT: Normocephalic atraumatic. Moist mucous membranes. EOM-I. PERRLA. Anicteric.   Neck: No lym repair  4.  Hx neurogenic bladder-monitor   5. hypothyroidism  Will do med rec once review complete  Quality:  · DVT Prophylaxis: lovenox  · CODE status: full  · Yeager: no  · If COVID testing is negative, may discontinue isolation: yes     Plan of care disc hyperlipidemia    • Neurogenic bladder 11/3/2015   • Numbness in both legs 1/19/2016   • Osteoarthrosis, localized, primary, knee 12/15/2011   • Primary osteoarthritis of both knees 10/24/2016   • Radius fracture 11/18/2010   • Renal calculus, left 11/3/20 Disp: , Rfl:   Levothyroxine Sodium 75 MCG Oral Tab, Take 1 tablet (75 mcg total) by mouth before breakfast., Disp: 90 tablet, Rfl: 0  Cyanocobalamin (VITAMIN B-12 OR), Take 1 tablet by mouth daily. , Disp: , Rfl:   Ascorbic Acid (VITAMIN C) 1000 MG Oral Ta Component Value Date    COVID19 Not Detected 10/16/2021    COVID19 Not Detected 09/29/2021       Pro-Calcitonin  No results for input(s): PCT in the last 168 hours.     Cardiac  Recent Labs   Lab 10/16/21  0541 10/16/21  0733   TROP <0.045 <0.045       Cr Consults    Reason for Consultation:  Chest pain    History of Present Illness:  Kala Crespo is a a(n) 80year old male chronic stable condition of mild dementia, dyslipidemia had a recent fracture and surgery of his left lower extremity.   Com urethral jelly 10 mL, 10 mL, Urethral, PRN  •  folic acid (FOLVITE) tab 1 mg, 1 mg, Oral, Daily  •  aspirin chewable tab 324 mg, 324 mg, Oral, Once  •  acetaminophen (TYLENOL) tab 650 mg, 650 mg, Oral, Q6H PRN  •  melatonin tab 3 mg, 3 mg, Oral, Nightly UT Neurological: He is alert and oriented to person, place, and time.             Diagnostics History:  EKG: Sinus with PAcs   Echo: LVEF normal   Stress Test:   Cath:   CTA Chest:   CXR:           Results:   Recent Labs   Lab 10/16/21  0541 10/16/21  2047 1 chest pain  Reason for Therapy: Mobility Dysfunction and Discharge Planning    History related to current admission: 79 yo male with dementia and recent fall with hip fx repair 2 weeks ago and discharged to [facility] [place] for rehab.   Pt admitted for w/u of ch RESTRICTION  Weight Bearing Restriction: L lower extremity           L Lower Extremity: Weight Bearing as Tolerated    PAIN ASSESSMENT  Ratin          COGNITION  · A&Ox1    RANGE OF MOTION AND STRENGTH ASSESSMENT  Upper extremity ROM and strength are w total A  Standing sink activities CGA  Chair transfers: min A with vcs for alignment and safety with sitting  Education: Role of PT, DC recs, safety, use of RW and A; pt/family verbalized understanding    Patient End of Session: Up in chair;Needs met;Call Goal #5    Goal #6    Goal Comments: Goals established on 10/17/2021             Occupational Therapy Notes (last 72 hours)  Notes from 10/15/2021  2:28 PM through 10/18/2021  2:28 PM   No notes of this type exist for this encounter.      [facility] pureed/hard solids. Of note, pt stated he needs his upper dentures to eat solid foods. Clinical s/s of aspiration were observed with thin liquid by straw, which included wet vocal quality immediately post-swallow.  No s/s of aspiration were observed with th cath   • Vitamin D deficiency disease 8/14/2012       Prior Living Situation:  ([facility])  Diet Prior to Admission: Regular; Thin liquids  Precautions: Aspiration    Patient/Family Goals:  To resume a safe oral diet    SWALLOWING HISTORY  Cu FOLLOW UP  Treatment Plan/Recommendations: Dysphagia therapy  Number of Visits to Meet Established Goals: 2  Follow Up Needed (Documentation Required): Yes  SLP Follow-up Date: 10/17/21    Thank you for your referral.   If you have any questions, [place]